# Patient Record
Sex: MALE | Race: WHITE | NOT HISPANIC OR LATINO | Employment: OTHER | URBAN - METROPOLITAN AREA
[De-identification: names, ages, dates, MRNs, and addresses within clinical notes are randomized per-mention and may not be internally consistent; named-entity substitution may affect disease eponyms.]

---

## 2021-07-17 PROBLEM — Z85.46 HISTORY OF PROSTATE CANCER: Status: ACTIVE | Noted: 2021-07-17

## 2021-07-17 PROBLEM — G20.A1 PARKINSON'S DISEASE (HCC): Status: ACTIVE | Noted: 2021-07-17

## 2021-07-17 PROBLEM — R44.1 HALLUCINATION, VISUAL: Status: ACTIVE | Noted: 2021-07-17

## 2021-07-17 PROBLEM — I10 HYPERTENSION: Status: ACTIVE | Noted: 2021-07-17

## 2021-07-17 PROBLEM — G47.00 INSOMNIA: Status: ACTIVE | Noted: 2021-07-17

## 2021-07-17 PROBLEM — Z91.81 HISTORY OF FALLING: Status: ACTIVE | Noted: 2021-07-17
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
## 2021-07-17 PROBLEM — Z79.4 LONG TERM (CURRENT) USE OF INSULIN (HCC): Status: ACTIVE | Noted: 2021-07-17

## 2021-07-17 PROBLEM — F41.9 ANXIETY DISORDER: Status: ACTIVE | Noted: 2021-07-17

## 2021-07-17 PROBLEM — Z96.651 HISTORY OF TOTAL RIGHT KNEE REPLACEMENT: Status: ACTIVE | Noted: 2021-07-17

## 2021-07-17 PROBLEM — H91.90 HARD OF HEARING: Status: ACTIVE | Noted: 2021-07-17

## 2021-07-17 PROBLEM — J45.909 ASTHMA: Status: ACTIVE | Noted: 2021-07-17

## 2021-07-17 PROBLEM — R32 URINARY INCONTINENCE: Status: ACTIVE | Noted: 2021-07-17

## 2021-07-17 PROBLEM — R41.89 COGNITIVE IMPAIRMENT: Status: ACTIVE | Noted: 2021-07-17

## 2021-07-17 PROBLEM — L89.319 PRESSURE INJURY OF SKIN OF RIGHT BUTTOCK: Status: ACTIVE | Noted: 2021-07-17

## 2021-07-17 PROBLEM — E11.9 TYPE 2 DIABETES MELLITUS WITHOUT COMPLICATIONS (HCC): Status: ACTIVE | Noted: 2021-07-17

## 2021-07-17 PROBLEM — Z97.4 WEARS HEARING AID IN BOTH EARS: Status: ACTIVE | Noted: 2021-07-17

## 2021-07-17 PROBLEM — M21.372 LEFT FOOT DROP: Status: ACTIVE | Noted: 2021-07-17

## 2021-07-17 PROBLEM — H26.9 CATARACTS, BILATERAL: Status: ACTIVE | Noted: 2021-07-17

## 2021-11-02 PROBLEM — R13.10 DYSPHAGIA: Status: ACTIVE | Noted: 2021-11-02

## 2021-11-02 PROBLEM — K21.9 GERD (GASTROESOPHAGEAL REFLUX DISEASE): Status: ACTIVE | Noted: 2021-11-02

## 2022-02-10 PROBLEM — D22.9 NEVUS: Status: ACTIVE | Noted: 2022-02-10

## 2022-02-10 PROBLEM — S31.829A BUTTOCK WOUND, LEFT, INITIAL ENCOUNTER: Status: ACTIVE | Noted: 2022-02-10

## 2022-02-15 ENCOUNTER — HOSPITAL ENCOUNTER (OUTPATIENT)
Facility: MEDICAL CENTER | Age: 77
End: 2022-02-15
Attending: PHYSICIAN ASSISTANT
Payer: COMMERCIAL

## 2022-02-15 LAB
25(OH)D3 SERPL-MCNC: 48 NG/ML (ref 30–100)
ALBUMIN SERPL BCP-MCNC: 4.4 G/DL (ref 3.2–4.9)
ALBUMIN/GLOB SERPL: 1.8 G/DL
ALP SERPL-CCNC: 97 U/L (ref 30–99)
ALT SERPL-CCNC: 10 U/L (ref 2–50)
ANION GAP SERPL CALC-SCNC: 18 MMOL/L (ref 7–16)
AST SERPL-CCNC: 15 U/L (ref 12–45)
BILIRUB SERPL-MCNC: 0.5 MG/DL (ref 0.1–1.5)
BUN SERPL-MCNC: 15 MG/DL (ref 8–22)
CALCIUM SERPL-MCNC: 9.4 MG/DL (ref 8.5–10.5)
CHLORIDE SERPL-SCNC: 105 MMOL/L (ref 96–112)
CHOLEST SERPL-MCNC: 126 MG/DL (ref 100–199)
CO2 SERPL-SCNC: 18 MMOL/L (ref 20–33)
CREAT SERPL-MCNC: 0.79 MG/DL (ref 0.5–1.4)
FASTING STATUS PATIENT QL REPORTED: NORMAL
FOLATE SERPL-MCNC: 11.4 NG/ML
GLOBULIN SER CALC-MCNC: 2.5 G/DL (ref 1.9–3.5)
GLUCOSE SERPL-MCNC: 128 MG/DL (ref 65–99)
HDLC SERPL-MCNC: 34 MG/DL
LDLC SERPL CALC-MCNC: 35 MG/DL
NT-PROBNP SERPL IA-MCNC: 109 PG/ML (ref 0–125)
POTASSIUM SERPL-SCNC: 4.8 MMOL/L (ref 3.6–5.5)
PROT SERPL-MCNC: 6.9 G/DL (ref 6–8.2)
SODIUM SERPL-SCNC: 141 MMOL/L (ref 135–145)
TRIGL SERPL-MCNC: 287 MG/DL (ref 0–149)
TSH SERPL DL<=0.005 MIU/L-ACNC: 1.2 UIU/ML (ref 0.38–5.33)
VIT B12 SERPL-MCNC: 481 PG/ML (ref 211–911)

## 2022-02-15 PROCEDURE — 80061 LIPID PANEL: CPT

## 2022-02-15 PROCEDURE — 82746 ASSAY OF FOLIC ACID SERUM: CPT

## 2022-02-15 PROCEDURE — 80053 COMPREHEN METABOLIC PANEL: CPT

## 2022-02-15 PROCEDURE — 82607 VITAMIN B-12: CPT

## 2022-02-15 PROCEDURE — 83880 ASSAY OF NATRIURETIC PEPTIDE: CPT

## 2022-02-15 PROCEDURE — 84443 ASSAY THYROID STIM HORMONE: CPT

## 2022-02-15 PROCEDURE — 82306 VITAMIN D 25 HYDROXY: CPT

## 2022-02-24 ENCOUNTER — HOSPITAL ENCOUNTER (OUTPATIENT)
Facility: MEDICAL CENTER | Age: 77
End: 2022-02-24
Attending: PHYSICIAN ASSISTANT
Payer: COMMERCIAL

## 2022-02-24 DIAGNOSIS — E11.9 TYPE 2 DIABETES MELLITUS WITHOUT COMPLICATION, WITH LONG-TERM CURRENT USE OF INSULIN (HCC): ICD-10-CM

## 2022-02-24 DIAGNOSIS — E55.9 VITAMIN D DEFICIENCY: ICD-10-CM

## 2022-02-24 DIAGNOSIS — Z85.46 PERSONAL HISTORY OF MALIGNANT NEOPLASM OF PROSTATE: ICD-10-CM

## 2022-02-24 DIAGNOSIS — R32 URINARY INCONTINENCE, UNSPECIFIED TYPE: ICD-10-CM

## 2022-02-24 DIAGNOSIS — Z79.4 TYPE 2 DIABETES MELLITUS WITHOUT COMPLICATION, WITH LONG-TERM CURRENT USE OF INSULIN (HCC): ICD-10-CM

## 2022-02-24 DIAGNOSIS — Z85.46 HISTORY OF PROSTATE CANCER: ICD-10-CM

## 2022-02-24 LAB
25(OH)D3 SERPL-MCNC: 49 NG/ML (ref 30–100)
ALBUMIN SERPL BCP-MCNC: 4.2 G/DL (ref 3.2–4.9)
ALBUMIN/GLOB SERPL: 1.5 G/DL
ALP SERPL-CCNC: 82 U/L (ref 30–99)
ALT SERPL-CCNC: 5 U/L (ref 2–50)
ANION GAP SERPL CALC-SCNC: 12 MMOL/L (ref 7–16)
AST SERPL-CCNC: 15 U/L (ref 12–45)
BASOPHILS # BLD AUTO: 0.8 % (ref 0–1.8)
BASOPHILS # BLD: 0.05 K/UL (ref 0–0.12)
BILIRUB SERPL-MCNC: 0.4 MG/DL (ref 0.1–1.5)
BUN SERPL-MCNC: 11 MG/DL (ref 8–22)
CALCIUM SERPL-MCNC: 9.3 MG/DL (ref 8.5–10.5)
CHLORIDE SERPL-SCNC: 104 MMOL/L (ref 96–112)
CHOLEST SERPL-MCNC: 111 MG/DL (ref 100–199)
CO2 SERPL-SCNC: 24 MMOL/L (ref 20–33)
CREAT SERPL-MCNC: 0.68 MG/DL (ref 0.5–1.4)
EOSINOPHIL # BLD AUTO: 0.12 K/UL (ref 0–0.51)
EOSINOPHIL NFR BLD: 1.9 % (ref 0–6.9)
ERYTHROCYTE [DISTWIDTH] IN BLOOD BY AUTOMATED COUNT: 43.8 FL (ref 35.9–50)
EST. AVERAGE GLUCOSE BLD GHB EST-MCNC: 131 MG/DL
FOLATE SERPL-MCNC: 10.1 NG/ML
GLOBULIN SER CALC-MCNC: 2.8 G/DL (ref 1.9–3.5)
GLUCOSE SERPL-MCNC: 140 MG/DL (ref 65–99)
HBA1C MFR BLD: 6.2 % (ref 4–5.6)
HCT VFR BLD AUTO: 43.7 % (ref 42–52)
HDLC SERPL-MCNC: 33 MG/DL
HGB BLD-MCNC: 15.3 G/DL (ref 14–18)
IMM GRANULOCYTES # BLD AUTO: 0.02 K/UL (ref 0–0.11)
IMM GRANULOCYTES NFR BLD AUTO: 0.3 % (ref 0–0.9)
LDLC SERPL CALC-MCNC: 43 MG/DL
LYMPHOCYTES # BLD AUTO: 1.63 K/UL (ref 1–4.8)
LYMPHOCYTES NFR BLD: 26.5 % (ref 22–41)
MCH RBC QN AUTO: 31.9 PG (ref 27–33)
MCHC RBC AUTO-ENTMCNC: 35 G/DL (ref 33.7–35.3)
MCV RBC AUTO: 91.2 FL (ref 81.4–97.8)
MONOCYTES # BLD AUTO: 0.45 K/UL (ref 0–0.85)
MONOCYTES NFR BLD AUTO: 7.3 % (ref 0–13.4)
NEUTROPHILS # BLD AUTO: 3.89 K/UL (ref 1.82–7.42)
NEUTROPHILS NFR BLD: 63.2 % (ref 44–72)
NRBC # BLD AUTO: 0 K/UL
NRBC BLD-RTO: 0 /100 WBC
NT-PROBNP SERPL IA-MCNC: 178 PG/ML (ref 0–125)
PLATELET # BLD AUTO: 182 K/UL (ref 164–446)
PMV BLD AUTO: 11.4 FL (ref 9–12.9)
POTASSIUM SERPL-SCNC: 4.3 MMOL/L (ref 3.6–5.5)
PROT SERPL-MCNC: 7 G/DL (ref 6–8.2)
PSA SERPL-MCNC: 0.45 NG/ML (ref 0–4)
RBC # BLD AUTO: 4.79 M/UL (ref 4.7–6.1)
SODIUM SERPL-SCNC: 140 MMOL/L (ref 135–145)
TRIGL SERPL-MCNC: 174 MG/DL (ref 0–149)
TSH SERPL DL<=0.005 MIU/L-ACNC: 1.54 UIU/ML (ref 0.38–5.33)
VIT B12 SERPL-MCNC: 472 PG/ML (ref 211–911)
WBC # BLD AUTO: 6.2 K/UL (ref 4.8–10.8)

## 2022-02-24 PROCEDURE — 80053 COMPREHEN METABOLIC PANEL: CPT

## 2022-02-24 PROCEDURE — 83880 ASSAY OF NATRIURETIC PEPTIDE: CPT

## 2022-02-24 PROCEDURE — 82607 VITAMIN B-12: CPT

## 2022-02-24 PROCEDURE — 84153 ASSAY OF PSA TOTAL: CPT

## 2022-02-24 PROCEDURE — 80061 LIPID PANEL: CPT

## 2022-02-24 PROCEDURE — 83036 HEMOGLOBIN GLYCOSYLATED A1C: CPT

## 2022-02-24 PROCEDURE — 84443 ASSAY THYROID STIM HORMONE: CPT

## 2022-02-24 PROCEDURE — 82306 VITAMIN D 25 HYDROXY: CPT

## 2022-02-24 PROCEDURE — 85025 COMPLETE CBC W/AUTO DIFF WBC: CPT

## 2022-02-24 PROCEDURE — 82746 ASSAY OF FOLIC ACID SERUM: CPT

## 2022-04-25 ENCOUNTER — APPOINTMENT (RX ONLY)
Dept: URBAN - METROPOLITAN AREA CLINIC 6 | Facility: CLINIC | Age: 77
Setting detail: DERMATOLOGY
End: 2022-04-25

## 2022-04-25 DIAGNOSIS — L81.4 OTHER MELANIN HYPERPIGMENTATION: ICD-10-CM

## 2022-04-25 DIAGNOSIS — D22 MELANOCYTIC NEVI: ICD-10-CM

## 2022-04-25 DIAGNOSIS — L82.1 OTHER SEBORRHEIC KERATOSIS: ICD-10-CM

## 2022-04-25 DIAGNOSIS — L82.0 INFLAMED SEBORRHEIC KERATOSIS: ICD-10-CM

## 2022-04-25 DIAGNOSIS — D18.0 HEMANGIOMA: ICD-10-CM

## 2022-04-25 DIAGNOSIS — L91.8 OTHER HYPERTROPHIC DISORDERS OF THE SKIN: ICD-10-CM

## 2022-04-25 DIAGNOSIS — L21.8 OTHER SEBORRHEIC DERMATITIS: ICD-10-CM | Status: WORSENING

## 2022-04-25 DIAGNOSIS — Z71.89 OTHER SPECIFIED COUNSELING: ICD-10-CM

## 2022-04-25 PROBLEM — D22.61 MELANOCYTIC NEVI OF RIGHT UPPER LIMB, INCLUDING SHOULDER: Status: ACTIVE | Noted: 2022-04-25

## 2022-04-25 PROBLEM — D18.01 HEMANGIOMA OF SKIN AND SUBCUTANEOUS TISSUE: Status: ACTIVE | Noted: 2022-04-25

## 2022-04-25 PROBLEM — D22.62 MELANOCYTIC NEVI OF LEFT UPPER LIMB, INCLUDING SHOULDER: Status: ACTIVE | Noted: 2022-04-25

## 2022-04-25 PROBLEM — D22.5 MELANOCYTIC NEVI OF TRUNK: Status: ACTIVE | Noted: 2022-04-25

## 2022-04-25 PROCEDURE — ? PRESCRIPTION

## 2022-04-25 PROCEDURE — ? SHAVE REMOVAL AND DESTRUCTION

## 2022-04-25 PROCEDURE — ? SUNSCREEN RECOMMENDATIONS

## 2022-04-25 PROCEDURE — ? LIQUID NITROGEN

## 2022-04-25 PROCEDURE — 17110 DESTRUCTION B9 LES UP TO 14: CPT | Mod: 59

## 2022-04-25 PROCEDURE — ? DIAGNOSIS COMMENT

## 2022-04-25 PROCEDURE — ? PRESCRIPTION MEDICATION MANAGEMENT

## 2022-04-25 PROCEDURE — 99204 OFFICE O/P NEW MOD 45 MIN: CPT | Mod: 25

## 2022-04-25 PROCEDURE — 11310 SHAVE SKIN LESION 0.5 CM/<: CPT

## 2022-04-25 PROCEDURE — ? COUNSELING

## 2022-04-25 RX ORDER — KETOCONAZOLE 20 MG/ML
SHAMPOO, SUSPENSION TOPICAL
Qty: 120 | Refills: 11 | Status: ERX | COMMUNITY
Start: 2022-04-25

## 2022-04-25 RX ADMIN — KETOCONAZOLE: 20 SHAMPOO, SUSPENSION TOPICAL at 00:00

## 2022-04-25 ASSESSMENT — LOCATION ZONE DERM
LOCATION ZONE: EAR
LOCATION ZONE: NECK
LOCATION ZONE: EYELID
LOCATION ZONE: TRUNK
LOCATION ZONE: FACE
LOCATION ZONE: LEG
LOCATION ZONE: ARM

## 2022-04-25 ASSESSMENT — LOCATION SIMPLE DESCRIPTION DERM
LOCATION SIMPLE: CHEST
LOCATION SIMPLE: RIGHT UPPER ARM
LOCATION SIMPLE: LEFT UPPER ARM
LOCATION SIMPLE: RIGHT UPPER BACK
LOCATION SIMPLE: RIGHT FOREHEAD
LOCATION SIMPLE: RIGHT EYELID
LOCATION SIMPLE: RIGHT EAR
LOCATION SIMPLE: RIGHT THIGH
LOCATION SIMPLE: CHIN
LOCATION SIMPLE: LEFT EAR
LOCATION SIMPLE: RIGHT FOREARM
LOCATION SIMPLE: ABDOMEN
LOCATION SIMPLE: TRAPEZIAL NECK
LOCATION SIMPLE: INFERIOR FOREHEAD
LOCATION SIMPLE: UPPER BACK
LOCATION SIMPLE: LEFT FOREARM

## 2022-04-25 ASSESSMENT — LOCATION DETAILED DESCRIPTION DERM
LOCATION DETAILED: RIGHT INFERIOR MEDIAL UPPER BACK
LOCATION DETAILED: PERIUMBILICAL SKIN
LOCATION DETAILED: SUPERIOR THORACIC SPINE
LOCATION DETAILED: LEFT VENTRAL PROXIMAL FOREARM
LOCATION DETAILED: RIGHT LATERAL CANTHUS
LOCATION DETAILED: RIGHT ANTERIOR PROXIMAL THIGH
LOCATION DETAILED: MID TRAPEZIAL NECK
LOCATION DETAILED: LEFT DISTAL POSTERIOR UPPER ARM
LOCATION DETAILED: RIGHT VENTRAL PROXIMAL FOREARM
LOCATION DETAILED: LEFT ANTIHELIX
LOCATION DETAILED: INFERIOR MID FOREHEAD
LOCATION DETAILED: STERNUM
LOCATION DETAILED: EPIGASTRIC SKIN
LOCATION DETAILED: LEFT PROXIMAL DORSAL FOREARM
LOCATION DETAILED: RIGHT SUPERIOR CRUS OF ANTIHELIX
LOCATION DETAILED: RIGHT INFERIOR MEDIAL FOREHEAD
LOCATION DETAILED: RIGHT DISTAL POSTERIOR UPPER ARM
LOCATION DETAILED: RIGHT PROXIMAL DORSAL FOREARM
LOCATION DETAILED: RIGHT LATERAL FOREHEAD
LOCATION DETAILED: LEFT ANTERIOR DISTAL UPPER ARM
LOCATION DETAILED: LEFT CHIN
LOCATION DETAILED: RIGHT ANTERIOR DISTAL UPPER ARM

## 2022-04-25 ASSESSMENT — SEVERITY ASSESSMENT: HOW SEVERE IS THIS PATIENT'S CONDITION?: MODERATE

## 2022-04-25 NOTE — PROCEDURE: SUNSCREEN RECOMMENDATIONS

## 2022-04-25 NOTE — PROCEDURE: COUNSELING
Detail Level: Zone
Detail Level: Generalized
Sunscreen Recommendations: Recommended broad spectrum inorganic or physical sunscreens primarily containing zinc oxide or titanium dioxide.
Detail Level: Detailed

## 2022-04-25 NOTE — PROCEDURE: MIPS QUALITY
Quality 130: Documentation Of Current Medications In The Medical Record: Current Medications Documented
Quality 111:Pneumonia Vaccination Status For Older Adults: Pneumococcal Vaccination Previously Received
Detail Level: Detailed
Quality 431: Preventive Care And Screening: Unhealthy Alcohol Use - Screening: Patient not identified as an unhealthy alcohol user when screened for unhealthy alcohol use using a systematic screening method
Quality 226: Preventive Care And Screening: Tobacco Use: Screening And Cessation Intervention: Patient screened for tobacco use and is an ex/non-smoker

## 2022-04-25 NOTE — PROCEDURE: PRESCRIPTION MEDICATION MANAGEMENT
Plan: Will consider adding topical steroid or ciclopirox cream at follow-up visit if lack of improvement.
Render In Strict Bullet Format?: No
Detail Level: Zone
Initiate Treatment: Ketoconazole 2% shampoo 2-3 times weekly, once weekly as maintenance.

## 2022-04-25 NOTE — PROCEDURE: DIAGNOSIS COMMENT
Detail Level: Zone
Comment: Patient has history of Parkinson’s disease, prominent seborrheic dermatitis involving the scalp, ears, face and groin.
Render Risk Assessment In Note?: no

## 2022-04-25 NOTE — PROCEDURE: SHAVE REMOVAL AND DESTRUCTION
Detail Level: Detailed
Size Of Lesion In Cm: 0.5
Size After Destruction (Required For Destruction Billing): 0.9
Anesthesia Type: 1% lidocaine with epinephrine
Anesthesia Volume In Cc: 1.5
Anesthesia Volume In Cc: 0
Hemostasis: Drysol
Cautery Type: electrodesiccation
Was Curettage Performed?: Yes
Number Of Curettages: 2
Wound Care: Petrolatum
Dressing: no dressing
Accession #: [id number]
Bill As?: Note: Bill Malignant Destruction If Path Confirms Malignant Lesion. Only Bill As Shave Removal If Path Comes Back Benign. Do Not Bill Shave Removal On Malignant Lesions.: Shave Removal
Lab: [id number]
Lab Facility: 
Path Notes Override (Will Replace All Of The Above Text): Please check margins.
Render Path Notes In Note?: No
Consent: Written consent was obtained and risks were reviewed including but not limited to scarring, infection, bleeding, scabbing, incomplete removal, nerve damage and allergy to anesthesia.
Post-Care Instructions: I reviewed with the patient in detail post-care instructions. Patient is to keep the biopsy site dry overnight, and then apply bacitracin twice daily until healed. Patient may apply hydrogen peroxide soaks to remove any crusting.
Notification Instructions: Patient will be notified of biopsy results. However, patient instructed to call the office if not contacted within 2 weeks.
Billing Type: Third-Party Bill

## 2022-04-25 NOTE — PROCEDURE: LIQUID NITROGEN
Medical Necessity Clause: This procedure was medically necessary because the lesions that were treated were:
Render Note In Bullet Format When Appropriate: No
Show Aperture Variable?: Yes
Detail Level: Detailed
Number Of Freeze-Thaw Cycles: 2 freeze-thaw cycles
Medical Necessity Information: It is in your best interest to select a reason for this procedure from the list below. All of these items fulfill various CMS LCD requirements except the new and changing color options.
Spray Paint Text: The liquid nitrogen was applied to the skin utilizing a spray paint frosting technique.
Consent: The patient's consent was obtained including but not limited to risks of crusting, scabbing, blistering, scarring, darker or lighter pigmentary change, recurrence, incomplete removal and infection.
Post-Care Instructions: I reviewed with the patient in detail post-care instructions. Patient is to wear sunprotection, and avoid picking at any of the treated lesions. Pt may apply Vaseline to crusted or scabbing areas.
Duration Of Freeze Thaw-Cycle (Seconds): 8

## 2022-04-27 ENCOUNTER — APPOINTMENT (RX ONLY)
Dept: URBAN - METROPOLITAN AREA CLINIC 6 | Facility: CLINIC | Age: 77
Setting detail: DERMATOLOGY
End: 2022-04-27

## 2022-04-27 DIAGNOSIS — L21.8 OTHER SEBORRHEIC DERMATITIS: ICD-10-CM

## 2022-04-27 PROCEDURE — ? PRESCRIPTION

## 2022-04-27 RX ORDER — KETOCONAZOLE 20 MG/ML
SHAMPOO, SUSPENSION TOPICAL
Qty: 120 | Refills: 11

## 2022-07-20 PROBLEM — E66.01 MORBID OBESITY WITH BODY MASS INDEX (BMI) OF 40.0 TO 44.9 IN ADULT (HCC): Status: ACTIVE | Noted: 2022-07-20

## 2022-07-25 ENCOUNTER — APPOINTMENT (RX ONLY)
Dept: URBAN - METROPOLITAN AREA CLINIC 6 | Facility: CLINIC | Age: 77
Setting detail: DERMATOLOGY
End: 2022-07-25

## 2022-07-25 DIAGNOSIS — L82.0 INFLAMED SEBORRHEIC KERATOSIS: ICD-10-CM

## 2022-07-25 DIAGNOSIS — L21.8 OTHER SEBORRHEIC DERMATITIS: ICD-10-CM | Status: IMPROVED

## 2022-07-25 PROCEDURE — 17110 DESTRUCTION B9 LES UP TO 14: CPT

## 2022-07-25 PROCEDURE — ? PRESCRIPTION MEDICATION MANAGEMENT

## 2022-07-25 PROCEDURE — ? PRESCRIPTION

## 2022-07-25 PROCEDURE — 99213 OFFICE O/P EST LOW 20 MIN: CPT | Mod: 25

## 2022-07-25 PROCEDURE — ? COUNSELING

## 2022-07-25 PROCEDURE — ? LIQUID NITROGEN

## 2022-07-25 PROCEDURE — ? DIAGNOSIS COMMENT

## 2022-07-25 RX ORDER — TRIAMCINOLONE ACETONIDE 1 MG/G
CREAM TOPICAL
Qty: 454 | Refills: 2 | Status: ERX | COMMUNITY
Start: 2022-07-25

## 2022-07-25 RX ADMIN — TRIAMCINOLONE ACETONIDE: 1 CREAM TOPICAL at 00:00

## 2022-07-25 ASSESSMENT — LOCATION DETAILED DESCRIPTION DERM
LOCATION DETAILED: INFERIOR MID FOREHEAD
LOCATION DETAILED: LEFT ANTIHELIX
LOCATION DETAILED: RIGHT SUPERIOR CRUS OF ANTIHELIX
LOCATION DETAILED: LEFT LATERAL TEMPLE
LOCATION DETAILED: RIGHT CENTRAL TEMPLE

## 2022-07-25 ASSESSMENT — LOCATION SIMPLE DESCRIPTION DERM
LOCATION SIMPLE: RIGHT EAR
LOCATION SIMPLE: LEFT EAR
LOCATION SIMPLE: LEFT TEMPLE
LOCATION SIMPLE: RIGHT TEMPLE
LOCATION SIMPLE: INFERIOR FOREHEAD

## 2022-07-25 ASSESSMENT — LOCATION ZONE DERM
LOCATION ZONE: FACE
LOCATION ZONE: EAR

## 2022-07-25 NOTE — PROCEDURE: PRESCRIPTION MEDICATION MANAGEMENT
Continue Regimen: Ketoconazole 2% shampoo 2-3 times weekly, once weekly as maintenance.
Render In Strict Bullet Format?: No
Detail Level: Zone
Initiate Treatment: Triamcinolone 0.1% cream BID PRN.

## 2022-07-25 NOTE — PROCEDURE: LIQUID NITROGEN
Include Z78.9 (Other Specified Conditions Influencing Health Status) As An Associated Diagnosis?: No
Detail Level: Detailed
Show Spray Paint Technique Variable?: Yes
Post-Care Instructions: I reviewed with the patient in detail post-care instructions. Patient is to wear sunprotection, and avoid picking at any of the treated lesions. Pt may apply Vaseline to crusted or scabbing areas.
Duration Of Freeze Thaw-Cycle (Seconds): 8
Spray Paint Text: The liquid nitrogen was applied to the skin utilizing a spray paint frosting technique.
Consent: The patient's consent was obtained including but not limited to risks of crusting, scabbing, blistering, scarring, darker or lighter pigmentary change, recurrence, incomplete removal and infection.
Number Of Freeze-Thaw Cycles: 2 freeze-thaw cycles
Medical Necessity Information: It is in your best interest to select a reason for this procedure from the list below. All of these items fulfill various CMS LCD requirements except the new and changing color options.
Medical Necessity Clause: This procedure was medically necessary because the lesions that were treated were:

## 2022-08-02 ENCOUNTER — RX ONLY (OUTPATIENT)
Age: 77
Setting detail: RX ONLY
End: 2022-08-02

## 2022-08-02 RX ORDER — TRIAMCINOLONE ACETONIDE 1 MG/G
CREAM TOPICAL
Qty: 454 | Refills: 2 | Status: ERX

## 2022-11-15 ENCOUNTER — APPOINTMENT (OUTPATIENT)
Dept: RADIOLOGY | Facility: MEDICAL CENTER | Age: 77
DRG: 192 | End: 2022-11-15
Attending: EMERGENCY MEDICINE
Payer: COMMERCIAL

## 2022-11-15 ENCOUNTER — HOSPITAL ENCOUNTER (INPATIENT)
Facility: MEDICAL CENTER | Age: 77
LOS: 3 days | DRG: 192 | End: 2022-11-18
Attending: EMERGENCY MEDICINE | Admitting: STUDENT IN AN ORGANIZED HEALTH CARE EDUCATION/TRAINING PROGRAM
Payer: COMMERCIAL

## 2022-11-15 DIAGNOSIS — J44.1 COPD EXACERBATION (HCC): ICD-10-CM

## 2022-11-15 DIAGNOSIS — G20.A1 PARKINSON'S DISEASE (HCC): ICD-10-CM

## 2022-11-15 DIAGNOSIS — J20.9 COPD WITH ACUTE BRONCHITIS (HCC): ICD-10-CM

## 2022-11-15 DIAGNOSIS — R53.81 DEBILITY: ICD-10-CM

## 2022-11-15 DIAGNOSIS — J44.0 COPD WITH ACUTE BRONCHITIS (HCC): ICD-10-CM

## 2022-11-15 DIAGNOSIS — R32 URINARY INCONTINENCE, UNSPECIFIED TYPE: ICD-10-CM

## 2022-11-15 DIAGNOSIS — M21.372 LEFT FOOT DROP: ICD-10-CM

## 2022-11-15 PROBLEM — D22.9 NEVUS: Status: RESOLVED | Noted: 2022-02-10 | Resolved: 2022-11-15

## 2022-11-15 PROBLEM — B34.8 PARAINFLUENZA INFECTION: Status: ACTIVE | Noted: 2022-11-15
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
## 2022-11-15 PROBLEM — S31.829A BUTTOCK WOUND, LEFT, INITIAL ENCOUNTER: Status: RESOLVED | Noted: 2022-02-10 | Resolved: 2022-11-15

## 2022-11-15 PROBLEM — L89.319 PRESSURE INJURY OF SKIN OF RIGHT BUTTOCK: Status: RESOLVED | Noted: 2021-07-17 | Resolved: 2022-11-15

## 2022-11-15 PROBLEM — Z97.4 WEARS HEARING AID IN BOTH EARS: Status: RESOLVED | Noted: 2021-07-17 | Resolved: 2022-11-15

## 2022-11-15 PROBLEM — J96.01 ACUTE RESPIRATORY FAILURE WITH HYPOXIA (HCC): Status: ACTIVE | Noted: 2022-11-15

## 2022-11-15 PROBLEM — J45.909 ASTHMA: Status: RESOLVED | Noted: 2021-07-17 | Resolved: 2022-11-15

## 2022-11-15 PROBLEM — Z91.81 HISTORY OF FALLING: Status: RESOLVED | Noted: 2021-07-17 | Resolved: 2022-11-15

## 2022-11-15 PROBLEM — G47.00 INSOMNIA: Status: RESOLVED | Noted: 2021-07-17 | Resolved: 2022-11-15

## 2022-11-15 LAB
ALBUMIN SERPL BCP-MCNC: 3.4 G/DL (ref 3.2–4.9)
ALBUMIN/GLOB SERPL: 1.2 G/DL
ALP SERPL-CCNC: 92 U/L (ref 30–99)
ALT SERPL-CCNC: 11 U/L (ref 2–50)
ANION GAP SERPL CALC-SCNC: 9 MMOL/L (ref 7–16)
AST SERPL-CCNC: 11 U/L (ref 12–45)
B PARAP IS1001 DNA NPH QL NAA+NON-PROBE: NOT DETECTED
B PERT.PT PRMT NPH QL NAA+NON-PROBE: NOT DETECTED
BASOPHILS # BLD AUTO: 0.8 % (ref 0–1.8)
BASOPHILS # BLD: 0.06 K/UL (ref 0–0.12)
BILIRUB SERPL-MCNC: 0.4 MG/DL (ref 0.1–1.5)
BUN SERPL-MCNC: 11 MG/DL (ref 8–22)
C PNEUM DNA NPH QL NAA+NON-PROBE: NOT DETECTED
CALCIUM SERPL-MCNC: 8.2 MG/DL (ref 8.5–10.5)
CHLORIDE SERPL-SCNC: 102 MMOL/L (ref 96–112)
CO2 SERPL-SCNC: 24 MMOL/L (ref 20–33)
CREAT SERPL-MCNC: 0.61 MG/DL (ref 0.5–1.4)
EKG IMPRESSION: NORMAL
EKG IMPRESSION: NORMAL
EOSINOPHIL # BLD AUTO: 0.26 K/UL (ref 0–0.51)
EOSINOPHIL NFR BLD: 3.4 % (ref 0–6.9)
ERYTHROCYTE [DISTWIDTH] IN BLOOD BY AUTOMATED COUNT: 43.8 FL (ref 35.9–50)
FEV1 % PREDICTED: 67
FEV1/FVC % PREDICTED: 93
FEV1/FVC: 70.69 %
FEV1: 2.06 L
FLUAV RNA NPH QL NAA+NON-PROBE: NOT DETECTED
FLUAV RNA SPEC QL NAA+PROBE: NEGATIVE
FLUBV RNA NPH QL NAA+NON-PROBE: NOT DETECTED
FLUBV RNA SPEC QL NAA+PROBE: NEGATIVE
FVC % PREDICTED: NORMAL
FVC (L): 2.91
GFR SERPLBLD CREATININE-BSD FMLA CKD-EPI: 100 ML/MIN/1.73 M 2
GLOBULIN SER CALC-MCNC: 2.9 G/DL (ref 1.9–3.5)
GLUCOSE BLD STRIP.AUTO-MCNC: 193 MG/DL (ref 65–99)
GLUCOSE BLD STRIP.AUTO-MCNC: 245 MG/DL (ref 65–99)
GLUCOSE BLD STRIP.AUTO-MCNC: 256 MG/DL (ref 65–99)
GLUCOSE BLD STRIP.AUTO-MCNC: 258 MG/DL (ref 65–99)
GLUCOSE BLD STRIP.AUTO-MCNC: 271 MG/DL (ref 65–99)
GLUCOSE SERPL-MCNC: 159 MG/DL (ref 65–99)
HADV DNA NPH QL NAA+NON-PROBE: NOT DETECTED
HCOV 229E RNA NPH QL NAA+NON-PROBE: NOT DETECTED
HCOV HKU1 RNA NPH QL NAA+NON-PROBE: NOT DETECTED
HCOV NL63 RNA NPH QL NAA+NON-PROBE: NOT DETECTED
HCOV OC43 RNA NPH QL NAA+NON-PROBE: NOT DETECTED
HCT VFR BLD AUTO: 40.9 % (ref 42–52)
HGB BLD-MCNC: 14.1 G/DL (ref 14–18)
HMPV RNA NPH QL NAA+NON-PROBE: NOT DETECTED
HPIV1 RNA NPH QL NAA+NON-PROBE: NOT DETECTED
HPIV2 RNA NPH QL NAA+NON-PROBE: NOT DETECTED
HPIV3 RNA NPH QL NAA+NON-PROBE: DETECTED
HPIV4 RNA NPH QL NAA+NON-PROBE: NOT DETECTED
IMM GRANULOCYTES # BLD AUTO: 0.04 K/UL (ref 0–0.11)
IMM GRANULOCYTES NFR BLD AUTO: 0.5 % (ref 0–0.9)
LYMPHOCYTES # BLD AUTO: 1.94 K/UL (ref 1–4.8)
LYMPHOCYTES NFR BLD: 25.7 % (ref 22–41)
M PNEUMO DNA NPH QL NAA+NON-PROBE: NOT DETECTED
MAGNESIUM SERPL-MCNC: 1.6 MG/DL (ref 1.5–2.5)
MCH RBC QN AUTO: 31.5 PG (ref 27–33)
MCHC RBC AUTO-ENTMCNC: 34.5 G/DL (ref 33.7–35.3)
MCV RBC AUTO: 91.5 FL (ref 81.4–97.8)
MONOCYTES # BLD AUTO: 0.78 K/UL (ref 0–0.85)
MONOCYTES NFR BLD AUTO: 10.3 % (ref 0–13.4)
NEUTROPHILS # BLD AUTO: 4.48 K/UL (ref 1.82–7.42)
NEUTROPHILS NFR BLD: 59.3 % (ref 44–72)
NRBC # BLD AUTO: 0 K/UL
NRBC BLD-RTO: 0 /100 WBC
NT-PROBNP SERPL IA-MCNC: 249 PG/ML (ref 0–125)
PLATELET # BLD AUTO: 145 K/UL (ref 164–446)
PMV BLD AUTO: 9.9 FL (ref 9–12.9)
POTASSIUM SERPL-SCNC: 3.8 MMOL/L (ref 3.6–5.5)
PROCALCITONIN SERPL-MCNC: 0.14 NG/ML
PROT SERPL-MCNC: 6.3 G/DL (ref 6–8.2)
RBC # BLD AUTO: 4.47 M/UL (ref 4.7–6.1)
RSV RNA NPH QL NAA+NON-PROBE: NOT DETECTED
RSV RNA SPEC QL NAA+PROBE: NEGATIVE
RV+EV RNA NPH QL NAA+NON-PROBE: NOT DETECTED
SARS-COV-2 RNA NPH QL NAA+NON-PROBE: NOTDETECTED
SARS-COV-2 RNA RESP QL NAA+PROBE: NOTDETECTED
SODIUM SERPL-SCNC: 135 MMOL/L (ref 135–145)
SPECIMEN SOURCE: NORMAL
TROPONIN T SERPL-MCNC: 9 NG/L (ref 6–19)
WBC # BLD AUTO: 7.6 K/UL (ref 4.8–10.8)

## 2022-11-15 PROCEDURE — 94640 AIRWAY INHALATION TREATMENT: CPT

## 2022-11-15 PROCEDURE — 83880 ASSAY OF NATRIURETIC PEPTIDE: CPT

## 2022-11-15 PROCEDURE — 83735 ASSAY OF MAGNESIUM: CPT

## 2022-11-15 PROCEDURE — 94669 MECHANICAL CHEST WALL OSCILL: CPT

## 2022-11-15 PROCEDURE — 82962 GLUCOSE BLOOD TEST: CPT

## 2022-11-15 PROCEDURE — 36415 COLL VENOUS BLD VENIPUNCTURE: CPT

## 2022-11-15 PROCEDURE — 700101 HCHG RX REV CODE 250: Performed by: EMERGENCY MEDICINE

## 2022-11-15 PROCEDURE — 94010 BREATHING CAPACITY TEST: CPT

## 2022-11-15 PROCEDURE — 99221 1ST HOSP IP/OBS SF/LOW 40: CPT | Mod: AI,GC | Performed by: STUDENT IN AN ORGANIZED HEALTH CARE EDUCATION/TRAINING PROGRAM

## 2022-11-15 PROCEDURE — 99285 EMERGENCY DEPT VISIT HI MDM: CPT

## 2022-11-15 PROCEDURE — 96374 THER/PROPH/DIAG INJ IV PUSH: CPT

## 2022-11-15 PROCEDURE — 84484 ASSAY OF TROPONIN QUANT: CPT

## 2022-11-15 PROCEDURE — A9270 NON-COVERED ITEM OR SERVICE: HCPCS | Performed by: STUDENT IN AN ORGANIZED HEALTH CARE EDUCATION/TRAINING PROGRAM

## 2022-11-15 PROCEDURE — 87633 RESP VIRUS 12-25 TARGETS: CPT

## 2022-11-15 PROCEDURE — 80053 COMPREHEN METABOLIC PANEL: CPT

## 2022-11-15 PROCEDURE — 700111 HCHG RX REV CODE 636 W/ 250 OVERRIDE (IP): Performed by: EMERGENCY MEDICINE

## 2022-11-15 PROCEDURE — 700102 HCHG RX REV CODE 250 W/ 637 OVERRIDE(OP): Performed by: INTERNAL MEDICINE

## 2022-11-15 PROCEDURE — 84145 PROCALCITONIN (PCT): CPT

## 2022-11-15 PROCEDURE — 0241U HCHG SARS-COV-2 COVID-19 NFCT DS RESP RNA 4 TRGT MIC: CPT

## 2022-11-15 PROCEDURE — 85025 COMPLETE CBC W/AUTO DIFF WBC: CPT

## 2022-11-15 PROCEDURE — 71045 X-RAY EXAM CHEST 1 VIEW: CPT

## 2022-11-15 PROCEDURE — C9803 HOPD COVID-19 SPEC COLLECT: HCPCS | Performed by: EMERGENCY MEDICINE

## 2022-11-15 PROCEDURE — 87486 CHLMYD PNEUM DNA AMP PROBE: CPT

## 2022-11-15 PROCEDURE — 700111 HCHG RX REV CODE 636 W/ 250 OVERRIDE (IP): Performed by: STUDENT IN AN ORGANIZED HEALTH CARE EDUCATION/TRAINING PROGRAM

## 2022-11-15 PROCEDURE — 770020 HCHG ROOM/CARE - TELE (206)

## 2022-11-15 PROCEDURE — 93005 ELECTROCARDIOGRAM TRACING: CPT | Performed by: EMERGENCY MEDICINE

## 2022-11-15 PROCEDURE — 8E0ZXY6 ISOLATION: ICD-10-PCS | Performed by: INTERNAL MEDICINE

## 2022-11-15 PROCEDURE — 700102 HCHG RX REV CODE 250 W/ 637 OVERRIDE(OP): Performed by: STUDENT IN AN ORGANIZED HEALTH CARE EDUCATION/TRAINING PROGRAM

## 2022-11-15 PROCEDURE — 94664 DEMO&/EVAL PT USE INHALER: CPT

## 2022-11-15 PROCEDURE — A9270 NON-COVERED ITEM OR SERVICE: HCPCS | Performed by: INTERNAL MEDICINE

## 2022-11-15 PROCEDURE — 87798 DETECT AGENT NOS DNA AMP: CPT

## 2022-11-15 PROCEDURE — 87581 M.PNEUMON DNA AMP PROBE: CPT

## 2022-11-15 PROCEDURE — 93010 ELECTROCARDIOGRAM REPORT: CPT | Performed by: INTERNAL MEDICINE

## 2022-11-15 PROCEDURE — 700101 HCHG RX REV CODE 250: Performed by: INTERNAL MEDICINE

## 2022-11-15 PROCEDURE — 99221 1ST HOSP IP/OBS SF/LOW 40: CPT | Mod: GC | Performed by: INTERNAL MEDICINE

## 2022-11-15 RX ORDER — BISACODYL 10 MG
10 SUPPOSITORY, RECTAL RECTAL
Status: DISCONTINUED | OUTPATIENT
Start: 2022-11-15 | End: 2022-11-18 | Stop reason: HOSPADM

## 2022-11-15 RX ORDER — IPRATROPIUM BROMIDE AND ALBUTEROL SULFATE 2.5; .5 MG/3ML; MG/3ML
3 SOLUTION RESPIRATORY (INHALATION)
Status: DISCONTINUED | OUTPATIENT
Start: 2022-11-15 | End: 2022-11-15

## 2022-11-15 RX ORDER — LOSARTAN POTASSIUM 50 MG/1
100 TABLET ORAL DAILY
Status: DISCONTINUED | OUTPATIENT
Start: 2022-11-15 | End: 2022-11-18 | Stop reason: HOSPADM

## 2022-11-15 RX ORDER — CARBIDOPA/LEVODOPA 25MG-250MG
2 TABLET ORAL EVERY 8 HOURS
Status: DISCONTINUED | OUTPATIENT
Start: 2022-11-15 | End: 2022-11-15

## 2022-11-15 RX ORDER — INSULIN LISPRO 100 [IU]/ML
2-9 INJECTION, SOLUTION INTRAVENOUS; SUBCUTANEOUS
Status: DISCONTINUED | OUTPATIENT
Start: 2022-11-15 | End: 2022-11-15

## 2022-11-15 RX ORDER — CARBIDOPA AND LEVODOPA 50; 200 MG/1; MG/1
1 TABLET, EXTENDED RELEASE ORAL
Status: DISCONTINUED | OUTPATIENT
Start: 2022-11-15 | End: 2022-11-18 | Stop reason: HOSPADM

## 2022-11-15 RX ORDER — FLUOXETINE 10 MG/1
10 CAPSULE ORAL 2 TIMES DAILY
COMMUNITY
End: 2023-05-02

## 2022-11-15 RX ORDER — METHYLPREDNISOLONE SODIUM SUCCINATE 125 MG/2ML
125 INJECTION, POWDER, LYOPHILIZED, FOR SOLUTION INTRAMUSCULAR; INTRAVENOUS ONCE
Status: COMPLETED | OUTPATIENT
Start: 2022-11-15 | End: 2022-11-15

## 2022-11-15 RX ORDER — SIMETHICONE 125 MG
250 TABLET,CHEWABLE ORAL EVERY 12 HOURS PRN
COMMUNITY
End: 2023-06-14

## 2022-11-15 RX ORDER — INSULIN LISPRO 100 [IU]/ML
0.2 INJECTION, SOLUTION INTRAVENOUS; SUBCUTANEOUS
Status: DISCONTINUED | OUTPATIENT
Start: 2022-11-15 | End: 2022-11-17

## 2022-11-15 RX ORDER — INSULIN LISPRO 100 [IU]/ML
0.2 INJECTION, SOLUTION INTRAVENOUS; SUBCUTANEOUS
Status: DISCONTINUED | OUTPATIENT
Start: 2022-11-15 | End: 2022-11-15

## 2022-11-15 RX ORDER — NYSTATIN 100000 [USP'U]/G
POWDER TOPICAL 3 TIMES DAILY
Status: DISCONTINUED | OUTPATIENT
Start: 2022-11-15 | End: 2022-11-18 | Stop reason: HOSPADM

## 2022-11-15 RX ORDER — ACETAMINOPHEN 325 MG/1
650 TABLET ORAL EVERY 6 HOURS PRN
Status: DISCONTINUED | OUTPATIENT
Start: 2022-11-15 | End: 2022-11-18 | Stop reason: HOSPADM

## 2022-11-15 RX ORDER — NYSTATIN 100000 [USP'U]/G
POWDER TOPICAL 2 TIMES DAILY
Status: COMPLETED | OUTPATIENT
Start: 2022-11-15 | End: 2022-11-15

## 2022-11-15 RX ORDER — CARBOXYMETHYLCELLULOSE SODIUM 5 MG/ML
1 SOLUTION/ DROPS OPHTHALMIC 3 TIMES DAILY
Status: DISCONTINUED | OUTPATIENT
Start: 2022-11-15 | End: 2022-11-18 | Stop reason: HOSPADM

## 2022-11-15 RX ORDER — IPRATROPIUM BROMIDE AND ALBUTEROL SULFATE 2.5; .5 MG/3ML; MG/3ML
3 SOLUTION RESPIRATORY (INHALATION)
Status: DISCONTINUED | OUTPATIENT
Start: 2022-11-15 | End: 2022-11-16

## 2022-11-15 RX ORDER — BUDESONIDE AND FORMOTEROL FUMARATE DIHYDRATE 160; 4.5 UG/1; UG/1
2 AEROSOL RESPIRATORY (INHALATION)
Status: DISCONTINUED | OUTPATIENT
Start: 2022-11-15 | End: 2022-11-18 | Stop reason: HOSPADM

## 2022-11-15 RX ORDER — OMEPRAZOLE 20 MG/1
20 CAPSULE, DELAYED RELEASE ORAL DAILY
Status: DISCONTINUED | OUTPATIENT
Start: 2022-11-15 | End: 2022-11-18 | Stop reason: HOSPADM

## 2022-11-15 RX ORDER — TAMSULOSIN HYDROCHLORIDE 0.4 MG/1
0.4 CAPSULE ORAL
Status: DISCONTINUED | OUTPATIENT
Start: 2022-11-15 | End: 2022-11-18 | Stop reason: HOSPADM

## 2022-11-15 RX ORDER — AMOXICILLIN 250 MG
2 CAPSULE ORAL 2 TIMES DAILY
Status: DISCONTINUED | OUTPATIENT
Start: 2022-11-15 | End: 2022-11-18 | Stop reason: HOSPADM

## 2022-11-15 RX ORDER — AZITHROMYCIN 250 MG/1
500 TABLET, FILM COATED ORAL DAILY
Status: COMPLETED | OUTPATIENT
Start: 2022-11-15 | End: 2022-11-17

## 2022-11-15 RX ORDER — DONEPEZIL HYDROCHLORIDE 5 MG/1
10 TABLET, FILM COATED ORAL 2 TIMES DAILY
Status: DISCONTINUED | OUTPATIENT
Start: 2022-11-15 | End: 2022-11-18 | Stop reason: HOSPADM

## 2022-11-15 RX ORDER — ALBUTEROL SULFATE 90 UG/1
2 AEROSOL, METERED RESPIRATORY (INHALATION) EVERY 4 HOURS PRN
Status: DISCONTINUED | OUTPATIENT
Start: 2022-11-15 | End: 2022-11-18 | Stop reason: HOSPADM

## 2022-11-15 RX ORDER — INSULIN LISPRO 100 [IU]/ML
2-9 INJECTION, SOLUTION INTRAVENOUS; SUBCUTANEOUS
Status: DISCONTINUED | OUTPATIENT
Start: 2022-11-15 | End: 2022-11-17

## 2022-11-15 RX ORDER — PREDNISONE 20 MG/1
40 TABLET ORAL DAILY
Status: DISCONTINUED | OUTPATIENT
Start: 2022-11-16 | End: 2022-11-15

## 2022-11-15 RX ORDER — IPRATROPIUM BROMIDE AND ALBUTEROL SULFATE 2.5; .5 MG/3ML; MG/3ML
3 SOLUTION RESPIRATORY (INHALATION)
Status: DISCONTINUED | OUTPATIENT
Start: 2022-11-15 | End: 2022-11-18 | Stop reason: HOSPADM

## 2022-11-15 RX ORDER — FLUOXETINE 10 MG/1
10 CAPSULE ORAL 2 TIMES DAILY
Status: DISCONTINUED | OUTPATIENT
Start: 2022-11-15 | End: 2022-11-18 | Stop reason: HOSPADM

## 2022-11-15 RX ORDER — ROPINIROLE 0.5 MG/1
0.5 TABLET, FILM COATED ORAL 3 TIMES DAILY
Status: DISCONTINUED | OUTPATIENT
Start: 2022-11-15 | End: 2022-11-18 | Stop reason: HOSPADM

## 2022-11-15 RX ORDER — PREDNISONE 20 MG/1
40 TABLET ORAL DAILY
Status: DISCONTINUED | OUTPATIENT
Start: 2022-11-15 | End: 2022-11-15

## 2022-11-15 RX ORDER — POLYETHYLENE GLYCOL 3350 17 G/17G
1 POWDER, FOR SOLUTION ORAL
Status: DISCONTINUED | OUTPATIENT
Start: 2022-11-15 | End: 2022-11-18 | Stop reason: HOSPADM

## 2022-11-15 RX ORDER — METHYLPREDNISOLONE SODIUM SUCCINATE 125 MG/2ML
62.5 INJECTION, POWDER, LYOPHILIZED, FOR SOLUTION INTRAMUSCULAR; INTRAVENOUS DAILY
Status: DISCONTINUED | OUTPATIENT
Start: 2022-11-15 | End: 2022-11-15

## 2022-11-15 RX ORDER — ENOXAPARIN SODIUM 100 MG/ML
40 INJECTION SUBCUTANEOUS DAILY
Status: DISCONTINUED | OUTPATIENT
Start: 2022-11-15 | End: 2022-11-18 | Stop reason: HOSPADM

## 2022-11-15 RX ORDER — PREDNISONE 20 MG/1
40 TABLET ORAL DAILY
Status: DISCONTINUED | OUTPATIENT
Start: 2022-11-16 | End: 2022-11-18 | Stop reason: HOSPADM

## 2022-11-15 RX ORDER — PRAVASTATIN SODIUM 20 MG
20 TABLET ORAL DAILY
Status: DISCONTINUED | OUTPATIENT
Start: 2022-11-15 | End: 2022-11-18 | Stop reason: HOSPADM

## 2022-11-15 RX ADMIN — CARBOXYMETHYLCELLULOSE SODIUM 1 DROP: 5 SOLUTION/ DROPS OPHTHALMIC at 14:53

## 2022-11-15 RX ADMIN — METFORMIN HYDROCHLORIDE 1000 MG: 500 TABLET ORAL at 09:06

## 2022-11-15 RX ADMIN — DONEPEZIL HYDROCHLORIDE 10 MG: 5 TABLET, FILM COATED ORAL at 09:06

## 2022-11-15 RX ADMIN — CARBIDOPA AND LEVODOPA 2 TABLET: 25; 250 TABLET ORAL at 17:17

## 2022-11-15 RX ADMIN — INSULIN LISPRO 5 UNITS: 100 INJECTION, SOLUTION INTRAVENOUS; SUBCUTANEOUS at 09:28

## 2022-11-15 RX ADMIN — INSULIN GLARGINE-YFGN 40 UNITS: 100 INJECTION, SOLUTION SUBCUTANEOUS at 18:23

## 2022-11-15 RX ADMIN — INSULIN LISPRO 5 UNITS: 100 INJECTION, SOLUTION INTRAVENOUS; SUBCUTANEOUS at 13:11

## 2022-11-15 RX ADMIN — NYSTATIN: 100000 POWDER TOPICAL at 17:18

## 2022-11-15 RX ADMIN — INSULIN LISPRO 7 UNITS: 100 INJECTION, SOLUTION INTRAVENOUS; SUBCUTANEOUS at 18:23

## 2022-11-15 RX ADMIN — LOSARTAN POTASSIUM 100 MG: 50 TABLET, FILM COATED ORAL at 09:06

## 2022-11-15 RX ADMIN — CARBIDOPA AND LEVODOPA 1 TABLET: 50; 200 TABLET, EXTENDED RELEASE ORAL at 21:07

## 2022-11-15 RX ADMIN — METHYLPREDNISOLONE SODIUM SUCCINATE 125 MG: 125 INJECTION, POWDER, FOR SOLUTION INTRAMUSCULAR; INTRAVENOUS at 03:15

## 2022-11-15 RX ADMIN — PRAVASTATIN SODIUM 20 MG: 20 TABLET ORAL at 09:05

## 2022-11-15 RX ADMIN — ENOXAPARIN SODIUM 40 MG: 40 INJECTION SUBCUTANEOUS at 09:03

## 2022-11-15 RX ADMIN — INSULIN LISPRO 7 UNITS: 100 INJECTION, SOLUTION INTRAVENOUS; SUBCUTANEOUS at 09:29

## 2022-11-15 RX ADMIN — METHYLPREDNISOLONE SODIUM SUCCINATE 62.5 MG: 125 INJECTION, POWDER, FOR SOLUTION INTRAMUSCULAR; INTRAVENOUS at 09:03

## 2022-11-15 RX ADMIN — TAMSULOSIN HYDROCHLORIDE 0.4 MG: 0.4 CAPSULE ORAL at 11:18

## 2022-11-15 RX ADMIN — CARBOXYMETHYLCELLULOSE SODIUM 1 DROP: 5 SOLUTION/ DROPS OPHTHALMIC at 09:52

## 2022-11-15 RX ADMIN — CARBOXYMETHYLCELLULOSE SODIUM 1 DROP: 5 SOLUTION/ DROPS OPHTHALMIC at 21:07

## 2022-11-15 RX ADMIN — ROPINIROLE HYDROCHLORIDE 0.5 MG: 0.5 TABLET, FILM COATED ORAL at 09:06

## 2022-11-15 RX ADMIN — IPRATROPIUM BROMIDE AND ALBUTEROL SULFATE 3 ML: .5; 2.5 SOLUTION RESPIRATORY (INHALATION) at 18:58

## 2022-11-15 RX ADMIN — INSULIN LISPRO 7 UNITS: 100 INJECTION, SOLUTION INTRAVENOUS; SUBCUTANEOUS at 13:11

## 2022-11-15 RX ADMIN — FLUOXETINE 10 MG: 10 CAPSULE ORAL at 22:27

## 2022-11-15 RX ADMIN — AZITHROMYCIN MONOHYDRATE 500 MG: 250 TABLET ORAL at 06:40

## 2022-11-15 RX ADMIN — FLUOXETINE 10 MG: 10 CAPSULE ORAL at 13:12

## 2022-11-15 RX ADMIN — INSULIN LISPRO 3 UNITS: 100 INJECTION, SOLUTION INTRAVENOUS; SUBCUTANEOUS at 18:24

## 2022-11-15 RX ADMIN — ROPINIROLE HYDROCHLORIDE 0.5 MG: 0.5 TABLET, FILM COATED ORAL at 17:18

## 2022-11-15 RX ADMIN — CARBIDOPA AND LEVODOPA 2 TABLET: 25; 250 TABLET ORAL at 09:52

## 2022-11-15 RX ADMIN — OMEPRAZOLE 20 MG: 20 CAPSULE, DELAYED RELEASE ORAL at 09:07

## 2022-11-15 RX ADMIN — ENOXAPARIN SODIUM 40 MG: 40 INJECTION SUBCUTANEOUS at 17:18

## 2022-11-15 RX ADMIN — DONEPEZIL HYDROCHLORIDE 10 MG: 5 TABLET, FILM COATED ORAL at 17:17

## 2022-11-15 RX ADMIN — NYSTATIN: 100000 POWDER TOPICAL at 14:53

## 2022-11-15 RX ADMIN — NYSTATIN: 100000 POWDER TOPICAL at 11:18

## 2022-11-15 RX ADMIN — INSULIN LISPRO 5 UNITS: 100 INJECTION, SOLUTION INTRAVENOUS; SUBCUTANEOUS at 21:07

## 2022-11-15 RX ADMIN — ALBUTEROL SULFATE 5 MG: 2.5 SOLUTION RESPIRATORY (INHALATION) at 03:15

## 2022-11-15 RX ADMIN — IPRATROPIUM BROMIDE AND ALBUTEROL SULFATE 3 ML: .5; 2.5 SOLUTION RESPIRATORY (INHALATION) at 10:45

## 2022-11-15 ASSESSMENT — ENCOUNTER SYMPTOMS
HEADACHES: 1
PALPITATIONS: 0
SORE THROAT: 0
WHEEZING: 1
SPUTUM PRODUCTION: 1
NECK PAIN: 0
FOCAL WEAKNESS: 0
BLURRED VISION: 0
DIZZINESS: 0
ORTHOPNEA: 1
FEVER: 1
COUGH: 1
PND: 1
DIARRHEA: 0
WEAKNESS: 1
HEMOPTYSIS: 0
DOUBLE VISION: 1
BACK PAIN: 0
CHILLS: 0
NAUSEA: 0
FEVER: 0
WHEEZING: 0
WEAKNESS: 0
VOMITING: 0
ABDOMINAL PAIN: 0
CONSTIPATION: 0
MYALGIAS: 0
DEPRESSION: 0
NERVOUS/ANXIOUS: 0
HEARTBURN: 0
SHORTNESS OF BREATH: 1

## 2022-11-15 ASSESSMENT — PATIENT HEALTH QUESTIONNAIRE - PHQ9
2. FEELING DOWN, DEPRESSED, IRRITABLE, OR HOPELESS: NOT AT ALL
1. LITTLE INTEREST OR PLEASURE IN DOING THINGS: NOT AT ALL
SUM OF ALL RESPONSES TO PHQ9 QUESTIONS 1 AND 2: 0

## 2022-11-15 ASSESSMENT — COGNITIVE AND FUNCTIONAL STATUS - GENERAL
WALKING IN HOSPITAL ROOM: TOTAL
DRESSING REGULAR UPPER BODY CLOTHING: TOTAL
HELP NEEDED FOR BATHING: TOTAL
TURNING FROM BACK TO SIDE WHILE IN FLAT BAD: UNABLE
MOVING TO AND FROM BED TO CHAIR: UNABLE
DRESSING REGULAR LOWER BODY CLOTHING: TOTAL
SUGGESTED CMS G CODE MODIFIER DAILY ACTIVITY: CM
PERSONAL GROOMING: TOTAL
MOVING FROM LYING ON BACK TO SITTING ON SIDE OF FLAT BED: UNABLE
EATING MEALS: A LOT
STANDING UP FROM CHAIR USING ARMS: TOTAL
TOILETING: TOTAL
DAILY ACTIVITIY SCORE: 7

## 2022-11-15 ASSESSMENT — COPD QUESTIONNAIRES
DURING THE PAST 4 WEEKS HOW MUCH DID YOU FEEL SHORT OF BREATH: NONE/LITTLE OF THE TIME
DO YOU EVER COUGH UP ANY MUCUS OR PHLEGM?: NO/ONLY WITH OCCASIONAL COLDS OR INFECTIONS
COPD SCREENING SCORE: 4
HAVE YOU SMOKED AT LEAST 100 CIGARETTES IN YOUR ENTIRE LIFE: YES

## 2022-11-15 ASSESSMENT — PAIN DESCRIPTION - PAIN TYPE: TYPE: ACUTE PAIN;CHRONIC PAIN

## 2022-11-15 ASSESSMENT — FIBROSIS 4 INDEX
FIB4 SCORE: 2.76
FIB4 SCORE: 1.72

## 2022-11-15 ASSESSMENT — LIFESTYLE VARIABLES: EVER_SMOKED: YES

## 2022-11-15 NOTE — ED PROVIDER NOTES
"ED Provider Note    CHIEF COMPLAINT  Chief Complaint   Patient presents with    Shortness of Breath    Cough     PT reports SOB getting worse over past 4 days and increasing cough.  PT BIB [facility]        HPI  Mc Krishnamurthy is a 76 y.o. male who [facility] chief complaint of cough and shortness of breath is progressively worsening over the last 4 days.  He states he had a production of green sputum.  Has a history of COPD is not on home oxygen.  He states has been using albuterol without relief of symptoms.  He is felt may be flushed once received some of his medications given hot flashes so is unsure if he had a fever.  No chest pain no unilateral bilateral leg swelling no abdominal pain little bit of bloating.  He is COVID and flu vaccinated.  He was given 2 duo nebs by EMS and states he is feeling much better.      Underlying history of Parkinson's prostate disease diabetes hypertension    REVIEW OF SYSTEMS  Positives as above. Pertinent negatives include nausea vomiting fevers hemoptysis chest pain abdominal pain flank pain dysuria hematuria diarrhea constipation easy leading bruising unilateral bilateral leg swelling  All other review of systems are negative    PAST MEDICAL HISTORY  Parkinson's insulin-dependent diabetes COPD hypertension prostate cancer    SOCIAL HISTORY  Social History     Tobacco Use    Smoking status: Not on file    Smokeless tobacco: Not on file   Substance and Sexual Activity    Alcohol use: Not on file    Drug use: Not on file    Sexual activity: Not on file       SURGICAL HISTORY  patient denies any surgical history    CURRENT MEDICATIONS  Home Medications    **Home medications have not yet been reviewed for this encounter**         ALLERGIES  No Known Allergies    PHYSICAL EXAM  VITAL SIGNS: BP (!) 141/73   Pulse 80   Temp 36.9 °C (98.5 °F) (Temporal)   Resp 18   Ht 1.778 m (5' 10\")   Wt 104 kg (229 lb 4.5 oz)   SpO2 92%   BMI 32.90 kg/m²    Pulse ox " interpretation: I interpret this pulse ox as normal.  Constitutional: Alert in no apparent distress.  HENT: Normocephalic atraumatic, MMM  Eyes: PER, Conjunctiva normal, Non-icteric.   Neck: Normal range of motion, No tenderness, Supple, No stridor.   Cardiovascular: Regular rate and rhythm, no murmurs.   Thorax & Lungs: No tachypnea but bilateral wheezes heard throughout with some coarse rhonchi at the apexes no chest tenderness.   Abdomen: Bowel sounds normal, Soft, No tenderness, No pulsatile masses. No peritoneal signs.  Skin: Warm, Dry, No erythema, No rash.   Back: No bony tenderness, No CVA tenderness.   Extremities/MSK: Intact equal distal pulses, No edema, No tenderness, No cyanosis, no major deformities noted rhythmic shaking left upper extremity which is chronic  Neurologic: Alert and oriented x3, No focal deficits noted.       DIFFERENTIAL DIAGNOSIS AND WORK UP PLAN    This is a 76 y.o. male who presents with likely acute COPD exacerbation versus bronchitis specially with change in sputum.  He is not currently hypoxic and is on room air but he is still wheezing quite a bit without tachypnea.  He will be given an IV nebulizer treatment as well as some steroids laboratory analysis including a COVID evaluation and evaluation for ACS although I doubt that he is not any chest pain.  I also doubt pulmonary embolism as the patient's physical examination is most likely consistent with the reactive airway illness.  His well score is 0    DIAGNOSTIC STUDIES / PROCEDURES    EKG  No results found for this or any previous visit.    LABS  Pertinent Lab Findings    Labs Reviewed   CBC WITH DIFFERENTIAL - Abnormal; Notable for the following components:       Result Value    RBC 4.47 (*)     Hematocrit 40.9 (*)     Platelet Count 145 (*)     All other components within normal limits    Narrative:     Biotin intake of greater than 5 mg per day may interfere with  troponin levels, causing false low values.   COV-2, FLU  A/B, AND RSV BY PCR ([facility]   COMP METABOLIC PANEL    Narrative:     Biotin intake of greater than 5 mg per day may interfere with  troponin levels, causing false low values.   TROPONIN    Narrative:     Biotin intake of greater than 5 mg per day may interfere with  troponin levels, causing false low values.   PROBRAIN NATRIURETIC PEPTIDE, NT    Narrative:     Biotin intake of greater than 5 mg per day may interfere with  troponin levels, causing false low values.       RADIOLOGY  DX-CHEST-PORTABLE (1 VIEW)   Final Result      1.  Mildly enlarged cardiac silhouette with vascular congestion/edema.        The radiologist's interpretation of all radiological studies have been reviewed by me.      COURSE & MEDICAL DECISION MAKING  Pertinent Labs & Imaging studies reviewed. (See chart for details)    3:26 AM  Patient tolerated recurrent nebulizer treatment well.  Not requiring oxygen at this time and resting comfortably with an improved respiratory status.    4:11 AM  Patient has been consistently in the 80s for the last half an hour we will restart him on some nasal cannula.  He is a little bit more shaky after the repeat nebulizer treatments we will hold off for now he is not in any respiratory distress by believe this is a COPD exacerbation or pending his viral panel that could be an underlying bronchitis especially with the sputum change but no elevated white blood cell count no evidence of a bacterial pneumonia.  The patient understands plan for hospitalization    Fortunately his proBNP and troponins are within normal limits I have low concern pulmonary embolus based on his physical examination    I verified that the patient was wearing a mask and I was wearing appropriate PPE every time I entered the room. The patient's mask was on the patient at all times during my encounter except for a brief view of the oropharynx.          FINAL IMPRESSION  1.  Acute COPD with exacerbation  2.  Viral respiratory illness  3.   Hypoxia  4.  Underlying Parkinson's        Electronically signed by: Manuela Elmore M.D., 11/15/2022 2:49 AM    This dictation has been created using voice recognition software and/or scribes. The accuracy of the dictation is limited by the abilities of the software and the expertise of the scribes. I expect there may be some errors of grammar and possibly content. I made every attempt to manually correct the errors within my dictation. However, errors related to voice recognition software and/or scribes may still exist and should be interpreted within the appropriate context.

## 2022-11-15 NOTE — ASSESSMENT & PLAN NOTE
Tested positive for parainfluenza  Likely associated with viral PNA  Likely the cause of his COPD exacerbation  Should be treated conservatively with supportive measures only

## 2022-11-15 NOTE — CONSULTS
Pulmonary Consultation    Date of consult: 11/15/2022    Referring Physician  Osvaldo Mac M.D.    Reason for Consultation  Suspicion of COPD exacerbation    History of Presenting Illness  76 y.o. male who presented 11/15/2022 with history significant for Parkinson, hypertension, hyperlipidemia, COPD with noncompliance to medication, IDDM 2, who was admitted on 11/15/2022 with complaint of shortness of breath.  This has been worse on exertion without any chest pain.  Associated with increased cough with increased sputum production with color going from clear to green and with wheezing.  [facility], he was diagnosed with [facility], but he has not been taking his medication as prescribed.  There is no immediate record as far as what his last FEV1 is.  There is no orthopnea but also symptoms of sleep apnea with snoring at night and multiple naps and fatigue during the day.  PND without any lower extremity edema.  He says to have been around people that are sick but cannot tell what they have.  No recent traveling.  On admission, he needed 4 L to maintain proper oxygenation whereas at home he does not use any oxygen.  There was no leukocytosis on initial lab.  He tested negative for COVID but tested positive for parainfluenza.  His chest x-ray showed mildly enlarged cardiac silhouette with vascular congestion/edema.  EKG and troponin without evidence of ACS.  BNP barely elevated at 249.  Procalcitonin WNL at 0.14.  He was admitted on suspicion of COPD exacerbation.  He was started on DuoNeb, albuterol and methylprednisone and azithromycin with symptomatic improvement although still on 3 L of oxygen.  Pulmonary service was consulted for management of COPD.  Patient used to be a smoker and quit several decades ago.    Code Status  Full Code    Review of Systems  Review of Systems   Constitutional:  Negative for chills and fever.   Respiratory:  Positive for cough, sputum  production, shortness of breath and wheezing.    Cardiovascular:  Positive for orthopnea and PND. Negative for chest pain and leg swelling.   Gastrointestinal:  Negative for heartburn and nausea.   Neurological:  Positive for weakness.     Past Medical History   has no past medical history on file.  No past medical history on file.     Surgical History   has no past surgical history on file.  No past surgical history on file.     Family History  Family history reviewed and no significant conditions or diseases relevant to the chief complaint were identified    Social History  Former smoker    Medications  Home Medications    **Home medications have not yet been reviewed for this encounter**       Current Facility-Administered Medications   Medication Dose Route Frequency Provider Last Rate Last Admin    senna-docusate (PERICOLACE or SENOKOT S) 8.6-50 MG per tablet 2 Tablet  2 Tablet Oral BID Edward Viera M.D.        And    polyethylene glycol/lytes (MIRALAX) PACKET 1 Packet  1 Packet Oral QDAY PRN Edward Viera M.D.        And    magnesium hydroxide (MILK OF MAGNESIA) suspension 30 mL  30 mL Oral QDAY PRN Edward Viera M.D.        And    bisacodyl (DULCOLAX) suppository 10 mg  10 mg Rectal QDAY PRN Edward Viera M.D.        Respiratory Therapy Consult   Nebulization Continuous RT Edward Viera M.D.        enoxaparin (Lovenox) inj 40 mg  40 mg Subcutaneous DAILY AT 1800 Edward Viera M.D.        acetaminophen (Tylenol) tablet 650 mg  650 mg Oral Q6HRS PRN Edward Viera M.D.        ipratropium-albuterol (DUONEB) nebulizer solution  3 mL Nebulization Q4HRS (RT) Edward Viera M.D.        ipratropium-albuterol (DUONEB) nebulizer solution  3 mL Nebulization Q2HRS PRN (RT) Edward Viera M.D.        albuterol inhaler 2 Puff  2 Puff Inhalation Q4HRS PRN Edward Viera M.D.        carbidopa-levodopa (SINEMET)  MG tablet 2 Tablet  2 Tablet Oral Q6HRS Edward NELSON  SHON Viera        carbidopa-levodopa SR (SINEMET CR)  MG tablet 1 Tablet  1 Tablet Oral QHS Edward Viera M.D.        carboxymethylcellulose (REFRESH TEARS) 0.5 % ophthalmic drops 1 Drop  1 Drop Both Eyes TID Edward Viera M.D.        donepezil (ARICEPT) tablet 10 mg  10 mg Oral BID Edward Viera M.D.        FLUoxetine (PROZAC) capsule 10 mg  10 mg Oral BID Edward Viera M.D.        losartan (COZAAR) tablet 100 mg  100 mg Oral DAILY Edward Viera M.D.        metFORMIN (GLUCOPHAGE) tablet 1,000 mg  1,000 mg Oral BID WITH MEALS Edward Viera M.D.        pravastatin (PRAVACHOL) tablet 20 mg  20 mg Oral DAILY Edward Viera M.D.        omeprazole (PRILOSEC) capsule 20 mg  20 mg Oral DAILY Edward Viera M.D.        tamsulosin (FLOMAX) capsule 0.4 mg  0.4 mg Oral AFTER BREAKFAST Edward Viera M.D.        methylPREDNISolone sod succ (SOLU-MEDROL) 125 MG injection 62.5 mg  62.5 mg Intravenous DAILY Edward Viera M.D.        azithromycin (ZITHROMAX) tablet 500 mg  500 mg Oral DAILY Edward Viera M.D.   500 mg at 11/15/22 0640    ROPINIRole (REQUIP) tablet 0.5 mg  0.5 mg Oral TID Edward Viera M.D.        insulin GLARGINE (Lantus,Semglee) injection  40 Units Subcutaneous Q EVENING Edward Viera M.D.        And    insulin lispro (AdmeLOG,HumaLOG) injection  0.2 Units/kg/day Subcutaneous TID AC Edward Viera M.D.        And    insulin lispro (AdmeLOG,HumaLOG) injection  2-9 Units Subcutaneous 4X/DAY ACHS Edward Viera M.D.        And    dextrose 10 % BOLUS 25 g  25 g Intravenous Q15 MIN PRN Edward Viera M.D.           Allergies  No Known Allergies    Vital Signs last 24 hours  Temp:  [36.7 °C (98.1 °F)-37 °C (98.6 °F)] 37 °C (98.6 °F)  Pulse:  [77-96] 91  Resp:  [18-24] 20  BP: (127-149)/(59-82) 149/80  SpO2:  [87 %-94 %] 91 %    Physical Exam  Physical Exam  Constitutional:       Appearance: He is ill-appearing.   HENT:       Head: Normocephalic.      Mouth/Throat:      Mouth: Mucous membranes are moist.   Cardiovascular:      Rate and Rhythm: Normal rate and regular rhythm.      Heart sounds: No murmur heard.  Pulmonary:      Effort: No respiratory distress.      Breath sounds: Wheezing and rhonchi present.   Chest:      Chest wall: No tenderness.   Musculoskeletal:      Left lower leg: No edema.   Neurological:      Mental Status: He is alert and oriented to person, place, and time.       Fluids  No intake or output data in the 24 hours ending 11/15/22 0822    Laboratory  Recent Results (from the past 48 hour(s))   CBC WITH DIFFERENTIAL    Collection Time: 11/15/22  3:20 AM   Result Value Ref Range    WBC 7.6 4.8 - 10.8 K/uL    RBC 4.47 (L) 4.70 - 6.10 M/uL    Hemoglobin 14.1 14.0 - 18.0 g/dL    Hematocrit 40.9 (L) 42.0 - 52.0 %    MCV 91.5 81.4 - 97.8 fL    MCH 31.5 27.0 - 33.0 pg    MCHC 34.5 33.7 - 35.3 g/dL    RDW 43.8 35.9 - 50.0 fL    Platelet Count 145 (L) 164 - 446 K/uL    MPV 9.9 9.0 - 12.9 fL    Neutrophils-Polys 59.30 44.00 - 72.00 %    Lymphocytes 25.70 22.00 - 41.00 %    Monocytes 10.30 0.00 - 13.40 %    Eosinophils 3.40 0.00 - 6.90 %    Basophils 0.80 0.00 - 1.80 %    Immature Granulocytes 0.50 0.00 - 0.90 %    Nucleated RBC 0.00 /100 WBC    Neutrophils (Absolute) 4.48 1.82 - 7.42 K/uL    Lymphs (Absolute) 1.94 1.00 - 4.80 K/uL    Monos (Absolute) 0.78 0.00 - 0.85 K/uL    Eos (Absolute) 0.26 0.00 - 0.51 K/uL    Baso (Absolute) 0.06 0.00 - 0.12 K/uL    Immature Granulocytes (abs) 0.04 0.00 - 0.11 K/uL    NRBC (Absolute) 0.00 K/uL   COMP METABOLIC PANEL    Collection Time: 11/15/22  3:20 AM   Result Value Ref Range    Sodium 135 135 - 145 mmol/L    Potassium 3.8 3.6 - 5.5 mmol/L    Chloride 102 96 - 112 mmol/L    Co2 24 20 - 33 mmol/L    Anion Gap 9.0 7.0 - 16.0    Glucose 159 (H) 65 - 99 mg/dL    Bun 11 8 - 22 mg/dL    Creatinine 0.61 0.50 - 1.40 mg/dL    Calcium 8.2 (L) 8.5 - 10.5 mg/dL    AST(SGOT) 11 (L) 12 - 45  U/L    ALT(SGPT) 11 2 - 50 U/L    Alkaline Phosphatase 92 30 - 99 U/L    Total Bilirubin 0.4 0.1 - 1.5 mg/dL    Albumin 3.4 3.2 - 4.9 g/dL    Total Protein 6.3 6.0 - 8.2 g/dL    Globulin 2.9 1.9 - 3.5 g/dL    A-G Ratio 1.2 g/dL   TROPONIN    Collection Time: 11/15/22  3:20 AM   Result Value Ref Range    Troponin T 9 6 - 19 ng/L   proBrain Natriuretic Peptide, NT    Collection Time: 11/15/22  3:20 AM   Result Value Ref Range    NT-proBNP 249 (H) 0 - 125 pg/mL   CoV-2, FLU A/B, and RSV by PCR (2-4 Hours CEPHEID) : Collect NP swab in VTM    Collection Time: 11/15/22  3:20 AM    Specimen: Respirate   Result Value Ref Range    Influenza virus A RNA Negative Negative    Influenza virus B, PCR Negative Negative    RSV, PCR Negative Negative    SARS-CoV-2 by PCR NotDetected     SARS-CoV-2 Source NP Swab    ESTIMATED GFR    Collection Time: 11/15/22  3:20 AM   Result Value Ref Range    GFR (CKD-EPI) 100 >60 mL/min/1.73 m 2   MAGNESIUM    Collection Time: 11/15/22  3:20 AM   Result Value Ref Range    Magnesium 1.6 1.5 - 2.5 mg/dL   POCT glucose device results    Collection Time: 11/15/22  4:11 AM   Result Value Ref Range    POC Glucose, Blood 193 (H) 65 - 99 mg/dL   EKG (NOW)    Collection Time: 11/15/22  4:20 AM   Result Value Ref Range    Report       [facility] Emergency Dept.    Test Date:  2022-11-15  Pt Name:    BARBARA WALKER                 Department: ER  MRN:        [id number]                      Room:        04  Gender:     Male                         Technician: [id number]  :        1945                   Requested By:ADRIANA LOJA  Order #:    [id number]                    Reading MD:    Measurements  Intervals                                Axis  Rate:       91                           P:          55  CA:         144                          QRS:        12  QRSD:       99                           T:          69  QT:         375  QTc:        462    Interpretive Statements  Sinus  rhythm  Abnormal R-wave progression, early transition  Baseline wander in lead(s) I,III,aVL  No previous ECG available for comparison     POCT glucose device results    Collection Time: 11/15/22  6:44 AM   Result Value Ref Range    POC Glucose, Blood 256 (H) 65 - 99 mg/dL   EKG (NOW)    Collection Time: 11/15/22  7:23 AM   Result Value Ref Range    Report       [facility]    Test Date:  2022-11-15  Pt Name:    BARBARA WALKER                 Department: ER  MRN:        [id number]                      Room:       T728  Gender:     Male                         Technician: JESSIE  :        1945                   Requested By:ADRIANA LOJA  Order #:    [id number]                    Reading MD:    Measurements  Intervals                                Axis  Rate:       82                           P:          53  MT:         174                          QRS:        11  QRSD:       93                           T:          47  QT:         398  QTc:        465    Interpretive Statements  Sinus rhythm  Abnormal R-wave progression, early transition  Compared to ECG 11/15/2022 04:20:08  No significant changes       Imaging  DX-CHEST-PORTABLE (1 VIEW)   Final Result      1.  Mildly enlarged cardiac silhouette with vascular congestion/edema.          Assessment/Plan    * Acute respiratory failure with hypoxia (HCC)  Assessment & Plan  Patient was admitted with symptoms of shortness of breath, increased cough, increased sputum production with change of color and wheezing which make the cardinal signs of COPD exacerbation   The exacerbation is likely caused by a parainfluenza virus which was positive on the respiratory panel  No evidence of pneumonia based on chest x-ray or bacterial infection based on procalcitonin.   Although patient has orthopnea and PND, these are likely related to sleep apnea versus heart failure without any weight gain and without any lower extremity edema.  mMRC <2 and for a GOLD C with 8.2%  chances of mortality  Spirometry at the hospital shows an obstructive pattern: FEV1/FVC at 70.69 and FEV1% at 67    Recommendations  -Continue on albuterol, DuoNeb and consider Symbicort.    -Continue on oxygen as needed.    -Continue on azithromycin  -At discharge, patient would likely need triple therapy given that he is GOLD C  -Pulmonary will set up a follow-up appointment at the clinic.  -Will likely also need sleep study outpatient    Parainfluenza infection  Assessment & Plan  Tested positive for parainfluenza  Likely associated with viral PNA  Likely the cause of his COPD exacerbation  Should be treated conservatively with supportive measures only    COPD exacerbation (HCC)- (present on admission)  Assessment & Plan  Previously diagnosed at the [facility]  Non compliance to medication  Please see above for plan      Discussed patient condition and risk of morbidity and/or mortality with patient and pulm attending Dr Ramos

## 2022-11-15 NOTE — ASSESSMENT & PLAN NOTE
Insulin dependent DM2. No current complications.  -diabetic diet  -SSI  -glargine 40u qHS  -continue metformin 1000mg PO BID

## 2022-11-15 NOTE — ED TRIAGE NOTES
"Chief Complaint   Patient presents with    Shortness of Breath    Cough     PT reports SOB getting worse over past 4 days and increasing cough.  PT BIB [facility]      Blood Pressure (Abnormal) 141/73   Pulse 80   Temperature 36.9 °C (98.5 °F) (Temporal)   Respiration 18   Height 1.778 m (5' 10\")   Weight 104 kg (229 lb 4.5 oz)   Oxygen Saturation 92%   Body Mass Index 32.90 kg/m²     "

## 2022-11-15 NOTE — RESPIRATORY CARE
PULMONARY FUNCTION TEST by COPD CLINICAL EDUCATOR  11/15/2022 at 1:06 PM by Arely Naidu, RRT     PFT performed by COPD educator. Bedside PFT will reside in results tab on EMR and sent to inpatient pulmonary to interpret.     Lab Results   Component Value Date    FEV1 (L) 2.06 11/15/2022    FEV1/FVC % 70.69 11/15/2022    FVC % Predicted 71% 11/15/2022    FEV1 % Predicted 67 11/15/2022    FVC 2.91 11/15/2022    FEV1/FVC % Predicted 93 11/15/2022

## 2022-11-15 NOTE — H&P
"History & Physical Note    Date of Admission: 11/15/2022  Admission Status: Emergency  [facility] Team: UNSOM  Attending: No att. providers found   Senior Resident: Edward Viera M.D.  Contact Number: [phone]    Chief Complaint: \"I keep coughing up stuff and cant breathe.\"     History of Present Illness (HPI):   Mc is a 76 y.o. male presents with acute dyspnea, productive cough; hx of parkinsons, HTN/HLD, COPD, IDDM2, prostate CA, anxiety;  who presented 11/15/2022 with dyspnea, acute hypoxic respiratory failure, COPD exacerbation.    For the past 4 days, has been having shortness of breath, productive cough of yellow/green sputum, and general feeling sick. He has not tried any medications to control these symptoms, but overall presented as he was feeling worse from earlier this week. Has had multiple episodes where he's felt flushed, but no known temperature elevations. Previous tobacco use in remission, quit 45 years ago.    Review of Systems:   Review of Systems   Constitutional:  Positive for fever and malaise/fatigue. Negative for chills.   HENT:  Negative for congestion and sore throat.    Eyes:  Positive for double vision. Negative for blurred vision.   Respiratory:  Positive for cough, sputum production and shortness of breath. Negative for hemoptysis and wheezing.    Cardiovascular:  Negative for chest pain and palpitations.   Gastrointestinal:  Negative for abdominal pain, constipation, diarrhea, nausea and vomiting.   Genitourinary:  Negative for dysuria, frequency and urgency.   Musculoskeletal:  Negative for back pain, myalgias and neck pain.   Neurological:  Positive for headaches. Negative for dizziness, focal weakness and weakness.   Psychiatric/Behavioral:  Negative for depression. The patient is not nervous/anxious.        Past Medical History:   Past Medical History was reviewed with patient.   has no past medical history on file.    Past Surgical History: Past Surgical History was reviewed " with patient.   has no past surgical history on file.    Medications: Medications have been reviewed with patient.  Prior to Admission Medications   Prescriptions Last Dose Informant Patient Reported? Taking?   ANTACID ULTRA STRENGTH 1000 MG Chew Tab   No No   Sig: TAKE 2 TABLETS (2000MG) BY MOUTH THREE TIMES A DAY  AS NEEDED FOR DYSPEPSIA   Bacillus Coagulans-Inulin (PROBIOTIC FORMULA) 1-250 BILLION-MG Cap   No No   Sig: TAKE 1 CAPSULE BY MOUTH ONCE DAILY   Carbidopa-Levodopa ER (RYTARY) 48. MG Cap CR   Yes No   Sig: Take 3 Capsules by mouth 4 times a day. Administer 6am, 10am, 2pm, and 6pm   Cholecalciferol (VITAMIN D3) 125 MCG (5000 UT) Cap   No No   Sig: TAKE 1 CAPSULE BY MOUTH ONCE DAILY   Dextromethorphan-guaiFENesin (TUSSIN DM)  MG/5ML Syrup   No No   Sig: Take 10 mL by mouth every 8 hours as needed (For cough or congestion).   FLUoxetine (PROZAC) 10 MG Cap   No No   Sig: TAKE 1 CAPSULE BY MOUTH ONCE DAILY FOR DEPRESSION   Patient taking differently: Take 10 mg by mouth 2 times a day.   Insulin Aspart (NOVOLOG FLEXPEN SC)   Yes No   Sig: Inject 20 Units under the skin 2 times a day. Administer before breakfast and before dinner   Insulin Detemir (LEVEMIR FLEXPEN SC)   Yes No   Sig: Inject 60 Units under the skin every day.   Multiple Vitamin (MULTIVITAMIN) Tab   No No   Sig: TAKE 1 TABLET BY MOUTH ONCE DAILY   Nutritional Supplements (GLUCERNA ADVANCE SHAKE PO)   Yes No   Sig: Take 1 Can by mouth 1 time a day as needed.   Polyethylene Glycol 3350 (MIRALAX PO)   Yes No   Sig: Take 17 g by mouth 1 time a day as needed.   REFRESH PLUS 0.5 % Solution   No No   Sig: ADMINISTER 1 DROP INTO BOTH EYES IN THE MORNING, AT NOON, AND AT BEDTIME.   ROPINIRole (REQUIP) 0.5 MG Tab   Yes No   Sig: Take 0.5 mg by mouth 3 times a day.   Sharps Container (BD SHARPS  XL) Misc   No No   Si Container every 30 days.   ULTICARE MICRO PEN NEEDLES   No No   Sig: USE AS DIRECTED   acetaminophen (ACETAMINOPHEN  EXTRA STRENGTH) 500 MG Tab   No No   Sig: Take 2 Tablets by mouth every 8 hours as needed for Mild Pain, Moderate Pain or Fever.   albuterol 108 (90 Base) MCG/ACT Aero Soln inhalation aerosol   Yes No   Sig: Inhale 2 Puffs every four hours as needed for Shortness of Breath.   calcium carbonate (OS-ADAM 500) 1250 (500 Ca) MG Tab   Yes No   Sig: Take 2,000 mg by mouth 3 times a day as needed (dyspepsia).   carbidopa-levodopa SR (SINEMET CR)  MG per tablet   Yes No   Sig: Take 1 tablet by mouth at bedtime.   carboxymethylcellulose (REFRESH TEARS) 0.5 % Solution   No No   Sig: Administer 1 Drop into both eyes in the morning, at noon, and at bedtime.   cetirizine (ZYRTEC) 10 MG Tab   No No   Sig: TAKE 1 TABLET BY MOUTH ONCE DAILY FOR SEASONAL ALLERGIES   donepezil (ARICEPT) 10 MG tablet   No No   Sig: TAKE 1 TABLET BY MOUTH TWICE DAILY   glucose 4 GM chewable tablet   No No   Sig: Chew 1 Tablet as needed for Low Blood Sugar.   ketoconazole (NIZORAL) 2 % shampoo   Yes No   lactobacillus rhamnosus (CULTURELLE) Cap capsule   Yes No   Sig: Take 1 Capsule by mouth every morning with breakfast.   losartan (COZAAR) 100 MG Tab   No No   Sig: TAKE 1 TABLET BY MOUTH ONCE DAILY   magnesium hydroxide (MILK OF MAGNESIA) 400 MG/5ML Suspension   Yes No   Sig: Take 30 mL by mouth 2 times a day as needed.   melatonin 3 MG Tab   No No   Sig: TAKE 1 TABLET BY MOUTH AT BEDTIME   metformin (GLUCOPHAGE) 1000 MG tablet   No No   Sig: Take 1 Tablet by mouth 2 times a day with meals.   nystatin (MYCOSTATIN) powder   Yes No   Sig: Apply 1 Application topically 2 times a day as needed.   omeprazole (PRILOSEC) 20 MG delayed-release capsule   No No   Sig: TAKE 1 TABLET BY MOUTH ONE TIME A DAY FOR GERD/DYSPESIA   pravastatin (PRAVACHOL) 20 MG Tab   No No   Sig: Take 1 Tablet by mouth every day.   simethicone (MYLICON) 125 MG chewable tablet   No No   Sig: TAKE 2 TABLETS BY MOUTH TWICE DAILY AS NEEDED FOR BLOATING OR GAS   tamsulosin (FLOMAX) 0.4  MG capsule   No No   Sig: TAKE 1 CAPSULE BY MOUTH EVERY MORNING 30 MINUTES AFTER BREAKFAST   triamcinolone acetonide (KENALOG) 0.1 % Cream   Yes No      Facility-Administered Medications: None        Allergies: Allergies have been reviewed with patient.  No Known Allergies    Family History:   family history is not on file.     Social History:   Tobacco: quit in 1980   Alcohol: no current use   Recreational drugs (illegal and prescription):  never   Employment: n/a  Activity Level: walking, household activities   Living situation:  house  Primary Care Provider: reviewed Onelia Jamison P.A.-C.  Other (stressors, spirituality, exposures):        Physical Exam:   Vitals:  Temp:  [36.7 °C (98.1 °F)-36.9 °C (98.5 °F)] 36.7 °C (98.1 °F)  Pulse:  [77-96] 96  Resp:  [18-24] 24  BP: (127-141)/(59-73) 139/66  SpO2:  [87 %-92 %] 90 %    Physical Exam  Constitutional:       General: He is not in acute distress.     Appearance: He is not ill-appearing or diaphoretic.   HENT:      Head: Normocephalic and atraumatic.      Mouth/Throat:      Mouth: Mucous membranes are moist.      Pharynx: Oropharynx is clear.   Eyes:      Extraocular Movements: Extraocular movements intact.      Pupils: Pupils are equal, round, and reactive to light.   Cardiovascular:      Rate and Rhythm: Normal rate and regular rhythm.      Heart sounds: No murmur heard.    No friction rub. No gallop.   Pulmonary:      Comments: Expiratory wheeze, no crackles/rales  Abdominal:      General: There is distension.      Palpations: Abdomen is soft.      Tenderness: There is no abdominal tenderness. There is no guarding or rebound.   Musculoskeletal:         General: Normal range of motion.      Cervical back: Normal range of motion.   Skin:     General: Skin is warm and dry.   Neurological:      General: No focal deficit present.      Mental Status: He is alert and oriented to person, place, and time.      Cranial Nerves: No cranial nerve deficit.      Sensory: No  sensory deficit.      Motor: No weakness.   Psychiatric:         Mood and Affect: Mood normal.         Behavior: Behavior normal.         Thought Content: Thought content normal.         Judgment: Judgment normal.       Labs:   Recent Labs     11/15/22  0320   WBC 7.6   RBC 4.47*   HEMOGLOBIN 14.1   HEMATOCRIT 40.9*   MCV 91.5   MCH 31.5   RDW 43.8   PLATELETCT 145*   MPV 9.9   NEUTSPOLYS 59.30   LYMPHOCYTES 25.70   MONOCYTES 10.30   EOSINOPHILS 3.40   BASOPHILS 0.80     Lab Results   Component Value Date/Time    SODIUM 135 11/15/2022 03:20 AM    POTASSIUM 3.8 11/15/2022 03:20 AM    CHLORIDE 102 11/15/2022 03:20 AM    CO2 24 11/15/2022 03:20 AM    GLUCOSE 159 (H) 11/15/2022 03:20 AM    BUN 11 11/15/2022 03:20 AM    CREATININE 0.61 11/15/2022 03:20 AM          EKG: Per my read, sinus rhythm, no ST/T wave abnormalities, significant artifact.     Imaging:   CXR 11/15:  Prominent pulmonary vasculature, no consolidations    Previous Data Review: reviewed    Problem Representation:     Mc is a 76 y.o. male presents with acute dyspnea, productive cough; hx of parkinsons, HTN/HLD, COPD, IDDM2, prostate CA, anxiety;  who presented 11/15/2022 with dyspnea, acute hypoxic respiratory failure, COPD exacerbation.    * COPD exacerbation (HCC)- (present on admission)  Assessment & Plan  [facility], PFT current unavailable. 4 days of increasing dyspnea, cough, sputum production. Not on oxygen at home, currently requiring 4L for sats >90%.  -RT protocols  -supplemental oxygen to sats >88%  -duonebs q4h, q4h/PRN  -albuterol q4h/PRN  -solumedrol 62.5mg IV qD (5 days, ends 11/20)  -azithromycin 500mg PO qD 3 days    GERD (gastroesophageal reflux disease)- (present on admission)  Assessment & Plan  GERD, no current symptoms  -continue omeprazole 20mg PO qD    Hypertension- (present on admission)  Assessment & Plan  HTN history, acceptable BP in ED of 130s.  -continue losartan 100mg PO qD    Urinary incontinence-  (present on admission)  Assessment & Plan  Urinary incontinence, chronic, unchanged.  -tamsulosin 0.4mg PO qD    Type 2 diabetes mellitus without complications (HCC)- (present on admission)  Assessment & Plan  Insulin dependent DM2. No current complications.  -diabetic diet  -SSI  -glargine 40u qHS  -continue metformin 1000mg PO BID    Parkinson's disease (HCC)- (present on admission)  Assessment & Plan  Parkinsons, with debilitating tremour. [facility] normally.  -sinemet 50-100mg PO q6h (x3 daily)  -sinemet SR 50-100mg PO qHS  -ropinerole 0.5mg PO TID  -[facility]

## 2022-11-15 NOTE — ASSESSMENT & PLAN NOTE
Parkinsons, with debilitating tremour. [facility].  -sinemet 50-100mg PO q6h (x3 daily)  -sinemet SR 50-100mg PO qHS  -ropinerole 0.5mg PO TID  -[facility]

## 2022-11-15 NOTE — ASSESSMENT & PLAN NOTE
Patient was admitted with symptoms of shortness of breath, increased cough, increased sputum production with change of color and wheezing which make the cardinal signs of COPD exacerbation   The exacerbation is likely caused by a parainfluenza virus which was positive on the respiratory panel  No evidence of pneumonia based on chest x-ray or bacterial infection based on procalcitonin.   Although patient has orthopnea and PND, these are likely related to sleep apnea versus heart failure without any weight gain and without any lower extremity edema.  mMRC <2 and for a GOLD C with 8.2% chances of mortality  Spirometry at the hospital shows an obstructive pattern: FEV1/FVC at 70.69 and FEV1% at 67    Recommendations  -Continue on albuterol, DuoNeb and consider Symbicort.    -Continue on oxygen as needed.    -Continue on azithromycin  -At discharge, patient would likely need triple therapy given that he is GOLD C  -Pulmonary will set up a follow-up appointment at the clinic.  -Will likely also need sleep study outpatient

## 2022-11-15 NOTE — H&P
This pharmacist reached out to [facility] in which patient fills medications.  Confirmed all doses and frequencies, [facility].     Hali Mcdaniels, PharmD  [id number]

## 2022-11-15 NOTE — ASSESSMENT & PLAN NOTE
[facility], PFT current unavailable. 4 days of increasing dyspnea, cough, sputum production. Not on oxygen at home, currently requiring 4L for sats >90%.  -RT protocols  -supplemental oxygen to sats >88%  -duonebs q4h, q4h/PRN  -albuterol q4h/PRN  -solumedrol 62.5mg IV qD (5 days, ends 11/20)  -azithromycin 500mg PO qD 3 days  Wean off O2 as tolerated

## 2022-11-15 NOTE — ASSESSMENT & PLAN NOTE
Previously diagnosed at the [facility]  Non compliance to medication  Please see above for plan

## 2022-11-15 NOTE — PROGRESS NOTES
0615: Pt arrived to Tele 7 unit at this time. Alert and oriented, able to participate in discussion of care.

## 2022-11-15 NOTE — PROGRESS NOTES
CC: SOB    History of Present Illness (HPI):   Mc is a 76 y.o. male presents with acute dyspnea, productive cough; hx of parkinsons, HTN/HLD, COPD, IDDM2, prostate CA, anxiety;  who presented 11/15/2022 with dyspnea, acute hypoxic respiratory failure, COPD exacerbation.     For the past 4 days, has been having shortness of breath, productive cough of yellow/green sputum, and general feeling sick. He has not tried any medications to control these symptoms, but overall presented as he was feeling worse from earlier this week. Has had multiple episodes where he's felt flushed, but no known temperature elevations. Previous tobacco use in remission, quit 45 years ago.    Interval note  I saw and examined the patient by the bedside.  he was just admitted earlier today for COPD exacerbation.    Currently on 3 L oxygen  Feeling a little bit better  Continue breathing treatment around-the-clock with DuoNeb nebulizer, albuterol inhaler, methylprednisone  COVID and influenza negative  Check procalcitonin  On azithromycin    Parkinson: on sinemet

## 2022-11-15 NOTE — ASSESSMENT & PLAN NOTE
Patient was admitted with symptoms of shortness of breath, increased cough, increased sputum production with change of color and wheezing which make the cardinal signs of COPD exacerbation   The exacerbation is likely caused by a parainfluenza virus which was positive on the respiratory panel  No evidence of pneumonia based on chest x-ray or bacterial infection based on procalcitonin.   Although patient has orthopnea and PND, these are likely related to sleep apnea versus heart failure without any weight gain and without any lower extremity edema.  mMRC <2 and for a GOLD C with 8.2% chances of mortality  Spirometry at the hospital shows an obstructive pattern: FEV1/FVC at 70.69 and FEV1% at 67    Recommendations  -Continue on albuterol, DuoNeb and consider Symbicort.    -Continue on oxygen as needed.    -Continue on azithromycin  -At discharge, patient would likely need triple therapy given that he is GOLD C  -Pulmonary will set up a follow-up appointment at the clinic.

## 2022-11-15 NOTE — PROGRESS NOTES
Assumed care of patient from [facility]. Pt is A+O x4. No chest pain or SOB. No active bleeding noted. Informed of safety and call system. rhythm is Sinus rhythm. Completed 4 eye skin check. Pt unable to do med rec, [facility].

## 2022-11-15 NOTE — PROGRESS NOTES
4 Eyes Skin Assessment Completed by THOR Juarez and THOR Garcia.    Head WDL  Ears WDL  Nose WDL  Mouth WDL  Neck WDL  Breast/Chest WDL  Shoulder Blades WDL  Spine WDL  (R) Arm/Elbow/Hand WDL  (L) Arm/Elbow/Hand WDL  Abdomen WDL  Groin Excoriation and Rash please see picture  Scrotum/Coccyx/Buttocks Blanching, Excoriation, and Discoloration please see picture  (R) Leg WDL discoloration  (L) Leg WDL discoloration   (R) Heel/Foot/Toe WDL  (L) Heel/Foot/Toe WDL          Devices In Places Tele Box      Interventions In Place Gray Ear Foams, condom cath, floated pt with pillows     Possible Skin Injury Yes    Pictures Uploaded Into Epic Yes  Wound Consult Placed Yes  RN Wound Prevention Protocol Ordered Yes

## 2022-11-16 LAB
ALBUMIN SERPL BCP-MCNC: 3.5 G/DL (ref 3.2–4.9)
ALBUMIN/GLOB SERPL: 1.1 G/DL
ALP SERPL-CCNC: 87 U/L (ref 30–99)
ALT SERPL-CCNC: 9 U/L (ref 2–50)
ANION GAP SERPL CALC-SCNC: 12 MMOL/L (ref 7–16)
AST SERPL-CCNC: 10 U/L (ref 12–45)
BILIRUB SERPL-MCNC: 0.4 MG/DL (ref 0.1–1.5)
BUN SERPL-MCNC: 17 MG/DL (ref 8–22)
CALCIUM SERPL-MCNC: 9.1 MG/DL (ref 8.5–10.5)
CHLORIDE SERPL-SCNC: 101 MMOL/L (ref 96–112)
CO2 SERPL-SCNC: 26 MMOL/L (ref 20–33)
CREAT SERPL-MCNC: 0.83 MG/DL (ref 0.5–1.4)
ERYTHROCYTE [DISTWIDTH] IN BLOOD BY AUTOMATED COUNT: 43.7 FL (ref 35.9–50)
GFR SERPLBLD CREATININE-BSD FMLA CKD-EPI: 91 ML/MIN/1.73 M 2
GLOBULIN SER CALC-MCNC: 3.3 G/DL (ref 1.9–3.5)
GLUCOSE BLD STRIP.AUTO-MCNC: 259 MG/DL (ref 65–99)
GLUCOSE BLD STRIP.AUTO-MCNC: 278 MG/DL (ref 65–99)
GLUCOSE BLD STRIP.AUTO-MCNC: 284 MG/DL (ref 65–99)
GLUCOSE BLD STRIP.AUTO-MCNC: 301 MG/DL (ref 65–99)
GLUCOSE BLD STRIP.AUTO-MCNC: 349 MG/DL (ref 65–99)
GLUCOSE SERPL-MCNC: 198 MG/DL (ref 65–99)
HCT VFR BLD AUTO: 42.5 % (ref 42–52)
HGB BLD-MCNC: 14.6 G/DL (ref 14–18)
MCH RBC QN AUTO: 31.7 PG (ref 27–33)
MCHC RBC AUTO-ENTMCNC: 34.4 G/DL (ref 33.7–35.3)
MCV RBC AUTO: 92.2 FL (ref 81.4–97.8)
PLATELET # BLD AUTO: 185 K/UL (ref 164–446)
PMV BLD AUTO: 10.6 FL (ref 9–12.9)
POTASSIUM SERPL-SCNC: 4.2 MMOL/L (ref 3.6–5.5)
PROT SERPL-MCNC: 6.8 G/DL (ref 6–8.2)
RBC # BLD AUTO: 4.61 M/UL (ref 4.7–6.1)
SODIUM SERPL-SCNC: 139 MMOL/L (ref 135–145)
WBC # BLD AUTO: 14.6 K/UL (ref 4.8–10.8)

## 2022-11-16 PROCEDURE — 94664 DEMO&/EVAL PT USE INHALER: CPT

## 2022-11-16 PROCEDURE — 85027 COMPLETE CBC AUTOMATED: CPT

## 2022-11-16 PROCEDURE — A9270 NON-COVERED ITEM OR SERVICE: HCPCS | Performed by: INTERNAL MEDICINE

## 2022-11-16 PROCEDURE — 700111 HCHG RX REV CODE 636 W/ 250 OVERRIDE (IP): Performed by: STUDENT IN AN ORGANIZED HEALTH CARE EDUCATION/TRAINING PROGRAM

## 2022-11-16 PROCEDURE — 770020 HCHG ROOM/CARE - TELE (206)

## 2022-11-16 PROCEDURE — 36415 COLL VENOUS BLD VENIPUNCTURE: CPT

## 2022-11-16 PROCEDURE — 700111 HCHG RX REV CODE 636 W/ 250 OVERRIDE (IP): Performed by: INTERNAL MEDICINE

## 2022-11-16 PROCEDURE — 700101 HCHG RX REV CODE 250: Performed by: INTERNAL MEDICINE

## 2022-11-16 PROCEDURE — 700102 HCHG RX REV CODE 250 W/ 637 OVERRIDE(OP): Performed by: STUDENT IN AN ORGANIZED HEALTH CARE EDUCATION/TRAINING PROGRAM

## 2022-11-16 PROCEDURE — 82962 GLUCOSE BLOOD TEST: CPT

## 2022-11-16 PROCEDURE — 80053 COMPREHEN METABOLIC PANEL: CPT

## 2022-11-16 PROCEDURE — 94640 AIRWAY INHALATION TREATMENT: CPT

## 2022-11-16 PROCEDURE — 94669 MECHANICAL CHEST WALL OSCILL: CPT

## 2022-11-16 PROCEDURE — A9270 NON-COVERED ITEM OR SERVICE: HCPCS | Performed by: STUDENT IN AN ORGANIZED HEALTH CARE EDUCATION/TRAINING PROGRAM

## 2022-11-16 PROCEDURE — 99232 SBSQ HOSP IP/OBS MODERATE 35: CPT | Performed by: INTERNAL MEDICINE

## 2022-11-16 PROCEDURE — 700102 HCHG RX REV CODE 250 W/ 637 OVERRIDE(OP): Performed by: INTERNAL MEDICINE

## 2022-11-16 RX ORDER — IPRATROPIUM BROMIDE AND ALBUTEROL SULFATE 2.5; .5 MG/3ML; MG/3ML
3 SOLUTION RESPIRATORY (INHALATION)
Status: DISCONTINUED | OUTPATIENT
Start: 2022-11-17 | End: 2022-11-18

## 2022-11-16 RX ADMIN — NYSTATIN: 100000 POWDER TOPICAL at 17:14

## 2022-11-16 RX ADMIN — ENOXAPARIN SODIUM 40 MG: 40 INJECTION SUBCUTANEOUS at 17:15

## 2022-11-16 RX ADMIN — CARBOXYMETHYLCELLULOSE SODIUM 1 DROP: 5 SOLUTION/ DROPS OPHTHALMIC at 21:52

## 2022-11-16 RX ADMIN — FLUOXETINE 10 MG: 10 CAPSULE ORAL at 21:52

## 2022-11-16 RX ADMIN — DONEPEZIL HYDROCHLORIDE 10 MG: 5 TABLET, FILM COATED ORAL at 06:18

## 2022-11-16 RX ADMIN — FLUOXETINE 10 MG: 10 CAPSULE ORAL at 09:27

## 2022-11-16 RX ADMIN — PREDNISONE 40 MG: 20 TABLET ORAL at 06:19

## 2022-11-16 RX ADMIN — CARBIDOPA AND LEVODOPA 1 TABLET: 50; 200 TABLET, EXTENDED RELEASE ORAL at 21:52

## 2022-11-16 RX ADMIN — LEVODOPA AND CARBIDOPA 3 CAPSULE: 195; 48.75 CAPSULE, EXTENDED RELEASE ORAL at 18:00

## 2022-11-16 RX ADMIN — LOSARTAN POTASSIUM 100 MG: 50 TABLET, FILM COATED ORAL at 06:18

## 2022-11-16 RX ADMIN — NYSTATIN: 100000 POWDER TOPICAL at 12:38

## 2022-11-16 RX ADMIN — TAMSULOSIN HYDROCHLORIDE 0.4 MG: 0.4 CAPSULE ORAL at 09:27

## 2022-11-16 RX ADMIN — INSULIN LISPRO 7 UNITS: 100 INJECTION, SOLUTION INTRAVENOUS; SUBCUTANEOUS at 12:56

## 2022-11-16 RX ADMIN — PRAVASTATIN SODIUM 20 MG: 20 TABLET ORAL at 06:19

## 2022-11-16 RX ADMIN — IPRATROPIUM BROMIDE AND ALBUTEROL SULFATE 3 ML: .5; 2.5 SOLUTION RESPIRATORY (INHALATION) at 07:25

## 2022-11-16 RX ADMIN — ROPINIROLE HYDROCHLORIDE 0.5 MG: 0.5 TABLET, FILM COATED ORAL at 17:14

## 2022-11-16 RX ADMIN — INSULIN LISPRO 6 UNITS: 100 INJECTION, SOLUTION INTRAVENOUS; SUBCUTANEOUS at 12:57

## 2022-11-16 RX ADMIN — LEVODOPA AND CARBIDOPA 3 CAPSULE: 195; 48.75 CAPSULE, EXTENDED RELEASE ORAL at 06:00

## 2022-11-16 RX ADMIN — IPRATROPIUM BROMIDE AND ALBUTEROL SULFATE 3 ML: .5; 2.5 SOLUTION RESPIRATORY (INHALATION) at 18:57

## 2022-11-16 RX ADMIN — INSULIN LISPRO 6 UNITS: 100 INJECTION, SOLUTION INTRAVENOUS; SUBCUTANEOUS at 21:50

## 2022-11-16 RX ADMIN — LEVODOPA AND CARBIDOPA 3 CAPSULE: 195; 48.75 CAPSULE, EXTENDED RELEASE ORAL at 10:00

## 2022-11-16 RX ADMIN — INSULIN LISPRO 7 UNITS: 100 INJECTION, SOLUTION INTRAVENOUS; SUBCUTANEOUS at 09:26

## 2022-11-16 RX ADMIN — SENNOSIDES AND DOCUSATE SODIUM 2 TABLET: 50; 8.6 TABLET ORAL at 06:19

## 2022-11-16 RX ADMIN — IPRATROPIUM BROMIDE AND ALBUTEROL SULFATE 3 ML: .5; 2.5 SOLUTION RESPIRATORY (INHALATION) at 14:49

## 2022-11-16 RX ADMIN — OMEPRAZOLE 20 MG: 20 CAPSULE, DELAYED RELEASE ORAL at 06:19

## 2022-11-16 RX ADMIN — ROPINIROLE HYDROCHLORIDE 0.5 MG: 0.5 TABLET, FILM COATED ORAL at 00:44

## 2022-11-16 RX ADMIN — LEVODOPA AND CARBIDOPA 3 CAPSULE: 195; 48.75 CAPSULE, EXTENDED RELEASE ORAL at 12:00

## 2022-11-16 RX ADMIN — INSULIN LISPRO 7 UNITS: 100 INJECTION, SOLUTION INTRAVENOUS; SUBCUTANEOUS at 18:21

## 2022-11-16 RX ADMIN — DONEPEZIL HYDROCHLORIDE 10 MG: 5 TABLET, FILM COATED ORAL at 17:14

## 2022-11-16 RX ADMIN — AZITHROMYCIN MONOHYDRATE 500 MG: 250 TABLET ORAL at 06:18

## 2022-11-16 RX ADMIN — CARBOXYMETHYLCELLULOSE SODIUM 1 DROP: 5 SOLUTION/ DROPS OPHTHALMIC at 17:15

## 2022-11-16 RX ADMIN — CARBOXYMETHYLCELLULOSE SODIUM 1 DROP: 5 SOLUTION/ DROPS OPHTHALMIC at 09:29

## 2022-11-16 RX ADMIN — INSULIN GLARGINE-YFGN 40 UNITS: 100 INJECTION, SOLUTION SUBCUTANEOUS at 18:21

## 2022-11-16 RX ADMIN — INSULIN LISPRO 5 UNITS: 100 INJECTION, SOLUTION INTRAVENOUS; SUBCUTANEOUS at 09:25

## 2022-11-16 RX ADMIN — INSULIN LISPRO 5 UNITS: 100 INJECTION, SOLUTION INTRAVENOUS; SUBCUTANEOUS at 18:21

## 2022-11-16 RX ADMIN — NYSTATIN: 100000 POWDER TOPICAL at 06:20

## 2022-11-16 RX ADMIN — BUDESONIDE AND FORMOTEROL FUMARATE DIHYDRATE 2 PUFF: 160; 4.5 AEROSOL RESPIRATORY (INHALATION) at 19:08

## 2022-11-16 RX ADMIN — ROPINIROLE HYDROCHLORIDE 0.5 MG: 0.5 TABLET, FILM COATED ORAL at 09:27

## 2022-11-16 ASSESSMENT — ENCOUNTER SYMPTOMS
COUGH: 1
NAUSEA: 0
SHORTNESS OF BREATH: 1
DEPRESSION: 0
FEVER: 1
WEAKNESS: 0
NECK PAIN: 0
HEADACHES: 1
WHEEZING: 0
VOMITING: 0
NERVOUS/ANXIOUS: 0
HEMOPTYSIS: 0
BLURRED VISION: 0
BACK PAIN: 0
DIZZINESS: 0
MYALGIAS: 0
SPUTUM PRODUCTION: 1
PALPITATIONS: 0
DOUBLE VISION: 1
FOCAL WEAKNESS: 0
CONSTIPATION: 0
CHILLS: 0
SORE THROAT: 0
ABDOMINAL PAIN: 0
DIARRHEA: 0

## 2022-11-16 ASSESSMENT — COGNITIVE AND FUNCTIONAL STATUS - GENERAL
WALKING IN HOSPITAL ROOM: TOTAL
TURNING FROM BACK TO SIDE WHILE IN FLAT BAD: A LOT
SUGGESTED CMS G CODE MODIFIER DAILY ACTIVITY: CM
DAILY ACTIVITIY SCORE: 8
DRESSING REGULAR LOWER BODY CLOTHING: TOTAL
DRESSING REGULAR UPPER BODY CLOTHING: TOTAL
CLIMB 3 TO 5 STEPS WITH RAILING: TOTAL
TOILETING: TOTAL
SUGGESTED CMS G CODE MODIFIER MOBILITY: CM
EATING MEALS: A LOT
MOBILITY SCORE: 7
MOVING FROM LYING ON BACK TO SITTING ON SIDE OF FLAT BED: UNABLE
STANDING UP FROM CHAIR USING ARMS: TOTAL
PERSONAL GROOMING: A LOT
HELP NEEDED FOR BATHING: TOTAL
MOVING TO AND FROM BED TO CHAIR: UNABLE

## 2022-11-16 ASSESSMENT — LIFESTYLE VARIABLES
EVER HAD A DRINK FIRST THING IN THE MORNING TO STEADY YOUR NERVES TO GET RID OF A HANGOVER: NO
HOW MANY TIMES IN THE PAST YEAR HAVE YOU HAD 5 OR MORE DRINKS IN A DAY: 0
CONSUMPTION TOTAL: NEGATIVE
HAVE YOU EVER FELT YOU SHOULD CUT DOWN ON YOUR DRINKING: NO
EVER FELT BAD OR GUILTY ABOUT YOUR DRINKING: NO
ON A TYPICAL DAY WHEN YOU DRINK ALCOHOL HOW MANY DRINKS DO YOU HAVE: 0
HAVE PEOPLE ANNOYED YOU BY CRITICIZING YOUR DRINKING: NO
TOTAL SCORE: 0
DOES PATIENT WANT TO STOP DRINKING: NO
AVERAGE NUMBER OF DAYS PER WEEK YOU HAVE A DRINK CONTAINING ALCOHOL: 0
ALCOHOL_USE: NO
TOTAL SCORE: 0
TOTAL SCORE: 0

## 2022-11-16 ASSESSMENT — PAIN DESCRIPTION - PAIN TYPE: TYPE: ACUTE PAIN;CHRONIC PAIN

## 2022-11-16 ASSESSMENT — FIBROSIS 4 INDEX: FIB4 SCORE: 1.72

## 2022-11-16 NOTE — CARE PLAN
The patient is Stable - Low risk of patient condition declining or worsening    Shift Goals  Clinical Goals: admission/med rec  Patient Goals: comfort  Family Goals: ANTONIETA    Progress made toward(s) clinical / shift goals:  wound and RT consulted     Patient is not progressing towards the following goals:

## 2022-11-16 NOTE — PROGRESS NOTES
Med Rec complete per patient's Medication Review Report  Asked for MAR twice from [facility], they only sent the active medications list so last doses are unknown  Allergies reviewed

## 2022-11-16 NOTE — CARE PLAN
Problem: Impaired Gas Exchange  Goal: Patient will demonstrate improved ventilation and adequate oxygenation and participate in treatment regimen within the level of ability/situation.  Outcome: Progressing     Problem: Knowledge Deficit - COPD  Goal: Patient/significant other demonstrates understanding of disease process, utilization of the Action Plan, medications and discharge instruction  Outcome: Progressing     Problem: Skin Integrity  Goal: Skin integrity is maintained or improved  Outcome: Progressing   The patient is Stable - Low risk of patient condition declining or worsening    Shift Goals  Clinical Goals: admission/med rec  Patient Goals: comfort  Family Goals: ANTONIETA    Progress made toward(s) clinical / shift goals:  RT and wound consulted     Patient is not progressing towards the following goals:

## 2022-11-16 NOTE — PROGRESS NOTES
[facility] Medicine Daily Progress Note    Date of Service  11/16/2022    Chief Complaint  Mc Krishnamurthy is a 76 y.o. male admitted 11/15/2022 with shortness of breath     Hospital Course  This is a 77 y/o M with PMHX of HTN, HLD< COPD IDDM2 who was admitted on 11/15/22 after presenting to ER complaining of SOB   For the past 4 days, has been having shortness of breath, productive cough of yellow/green sputum, and general feeling sick. He has not tried any medications to control these symptoms, but overall presented as he was feeling worse from earlier this week  Patient admitted for further treatment     Interval Problem Update  Patient seen and examined ,afebrile, no overnight events, still feeling SOB, cont on inhalers, and prednisone, wean off O2 as tolerated     I have discussed this patient's plan of care and discharge plan at IDT rounds today with Case Management, Nursing, Nursing leadership, and other members of the IDT team.    Consultants/Specialty  pulmonary    Code Status  Full Code    Disposition  Patient is not medically cleared for discharge.   Anticipate discharge to  TBD  .  I have placed the appropriate orders for post-discharge needs.    Review of Systems  Review of Systems   Constitutional:  Positive for fever and malaise/fatigue. Negative for chills.   HENT:  Negative for congestion and sore throat.    Eyes:  Positive for double vision. Negative for blurred vision.   Respiratory:  Positive for cough, sputum production and shortness of breath. Negative for hemoptysis and wheezing.    Cardiovascular:  Negative for chest pain and palpitations.   Gastrointestinal:  Negative for abdominal pain, constipation, diarrhea, nausea and vomiting.   Genitourinary:  Negative for dysuria, frequency and urgency.   Musculoskeletal:  Negative for back pain, myalgias and neck pain.   Neurological:  Positive for headaches. Negative for dizziness, focal weakness and weakness.   Psychiatric/Behavioral:  Negative for  depression. The patient is not nervous/anxious.       Physical Exam  Temp:  [36.3 °C (97.3 °F)-37.3 °C (99.1 °F)] 36.3 °C (97.3 °F)  Pulse:  [] 75  Resp:  [16-18] 18  BP: (105-136)/(60-85) 133/85  SpO2:  [91 %-94 %] 91 %    Physical Exam  Vitals and nursing note reviewed.   Constitutional:       General: He is not in acute distress.     Appearance: He is not ill-appearing or diaphoretic.   HENT:      Head: Normocephalic and atraumatic.      Mouth/Throat:      Mouth: Mucous membranes are moist.      Pharynx: Oropharynx is clear.   Eyes:      Extraocular Movements: Extraocular movements intact.      Pupils: Pupils are equal, round, and reactive to light.   Cardiovascular:      Rate and Rhythm: Normal rate and regular rhythm.      Heart sounds: No murmur heard.    No friction rub. No gallop.   Pulmonary:      Comments: Expiratory wheeze, no crackles/rales  Abdominal:      General: There is distension.      Palpations: Abdomen is soft.      Tenderness: There is no abdominal tenderness. There is no guarding or rebound.   Musculoskeletal:         General: Normal range of motion.      Cervical back: Normal range of motion.   Skin:     General: Skin is warm and dry.   Neurological:      General: No focal deficit present.      Mental Status: He is alert and oriented to person, place, and time.      Cranial Nerves: No cranial nerve deficit.      Sensory: No sensory deficit.      Motor: No weakness.   Psychiatric:         Mood and Affect: Mood normal.         Behavior: Behavior normal.         Thought Content: Thought content normal.         Judgment: Judgment normal.       Fluids    Intake/Output Summary (Last 24 hours) at 11/16/2022 1343  Last data filed at 11/16/2022 0900  Gross per 24 hour   Intake 1050 ml   Output 3350 ml   Net -2300 ml       Laboratory  Recent Labs     11/15/22  0320 11/16/22  0310   WBC 7.6 14.6*   RBC 4.47* 4.61*   HEMOGLOBIN 14.1 14.6   HEMATOCRIT 40.9* 42.5   MCV 91.5 92.2   MCH 31.5 31.7    MCHC 34.5 34.4   RDW 43.8 43.7   PLATELETCT 145* 185   MPV 9.9 10.6     Recent Labs     11/15/22  0320 11/16/22  0310   SODIUM 135 139   POTASSIUM 3.8 4.2   CHLORIDE 102 101   CO2 24 26   GLUCOSE 159* 198*   BUN 11 17   CREATININE 0.61 0.83   CALCIUM 8.2* 9.1                   Imaging  DX-CHEST-PORTABLE (1 VIEW)   Final Result      1.  Mildly enlarged cardiac silhouette with vascular congestion/edema.           Assessment/Plan  COPD exacerbation (HCC)- (present on admission)  Assessment & Plan  [facility], [facility] current unavailable. 4 days of increasing dyspnea, cough, sputum production. Not on oxygen at home, currently requiring 4L for sats >90%.  -RT protocols  -supplemental oxygen to sats >88%  -duonebs q4h, q4h/PRN  -albuterol q4h/PRN  -solumedrol 62.5mg IV qD (5 days, ends 11/20)  -azithromycin 500mg PO qD 3 days  Wean off O2 as tolerated     GERD (gastroesophageal reflux disease)- (present on admission)  Assessment & Plan  GERD, no current symptoms  -continue omeprazole 20mg PO qD    Hypertension- (present on admission)  Assessment & Plan  HTN history, acceptable BP in ED of 130s.  -continue losartan 100mg PO qD    Urinary incontinence- (present on admission)  Assessment & Plan  Urinary incontinence, chronic, unchanged.  -tamsulosin 0.4mg PO qD    Type 2 diabetes mellitus without complications (HCC)- (present on admission)  Assessment & Plan  Insulin dependent DM2. No current complications.  -diabetic diet  -SSI  -glargine 40u qHS  -continue metformin 1000mg PO BID    Parkinson's disease (HCC)- (present on admission)  Assessment & Plan  Parkinsons, with debilitating tremour. [facility].  -sinemet 50-100mg PO q6h (x3 daily)  -sinemet SR 50-100mg PO qHS  -ropinerole 0.5mg PO TID  -[facility]       VTE prophylaxis: enoxaparin ppx    I have performed a physical exam and reviewed and updated ROS and Plan today (11/16/2022). In review of yesterday's  note (11/15/2022), there are no changes except as documented above.

## 2022-11-16 NOTE — WOUND TEAM
[facility]  Inpatient Services  Wound and Skin Care Brief Evaluation    Admission Date: 11/15/2022     Last order of IP CONSULT TO WOUND CARE was found on 11/15/2022 from Hospital Encounter on 11/15/2022     HPI, PMH, SH: Reviewed    Chief Complaint   Patient presents with    Shortness of Breath    Cough     PT reports SOB getting worse over past 4 days and increasing cough.  PT BIB [facility]      Diagnosis: COPD exacerbation (HCC) [J44.1]    Unit where seen by Wound Team: T728/02     Wound consult placed regarding bilateral groin/pannus and sacrum. Chart and images reviewed. This RN in to assess patient. Pt pleasant and agreeable. MASD with yeast-like appearance to bilateral groin/pannus region. Nystatin powder and interdry already in place. Area cleansed and more nystatin powder applied and interdry replaced. Sacrum with blanchable erythema and small area of open partial thickness injury r/t friction. No pressure injuries or advanced wound care needs identified. Wound consult completed. Wound team signing off, re-consult if patient has further advanced wound care needs or if current wounds deteriorate.            RSKIN:   CURRENTLY IN PLACE (X), APPLIED THIS VISIT (A), ORDERED (O):   Q shift Fermin:  X  Q shift pressure point assessments:  X    Surface/Positioning   Pressure redistribution mattress          X  Low Airloss          Bariatric foam      Bariatric AWAIS     Waffle cushion        Waffle Overlay          Reposition q 2 hours      TAPs Turning system     Z Bryce Pillow     Offloading/Redistribution NA  Sacral Mepilex (Silicone dressing)     Heel Mepilex (Silicone dressing)         Heel float boots (Prevalon boot)             Float Heels off Bed with Pillows           Respiratory   Silicone O2 tubing       X  Gray Foam Ear protectors   X  Cannula fixation Device (Tender )          High flow offloading Clip    Elastic head band offloading device      Anchorfast                                                          Trach with Optifoam split foam             Containment/Moisture Prevention     Rectal tube or BMS    Purwick/Condom Cath        Blair Catheter    Barrier wipes         O  Barrier paste     A  Antifungal tx    X  Interdry    X    Mobilization       Up to chair      X  Ambulate      PT/OT      Nutrition       Dietician        Diabetes Education      PO  X   TF     TPN     NPO   # days

## 2022-11-16 NOTE — DISCHARGE PLANNING
[facility] Transitional [facility] Coordination    Referral from: VÍCTOR Alexander    Insurance Provider on Facesheet: [facility]    Potential Rehab Diagnosis: Debility    Chart review indicates patient may have on going medical management and may have therapy needs to possibly meet inpatient rehab facility criteria with the goal of returning to community.    D/C support will need to be verified: None listed    Physiatry consultation pended per protocol.  TX pending.     Thank you for the referral.

## 2022-11-16 NOTE — CARE PLAN
The patient is Stable - Low risk of patient condition declining or worsening    Shift Goals  Clinical Goals: Maintain O2 sat > 90% on 2L NC, bath, skin care  Patient Goals: Eat dinner  Family Goals: ANTONIETA      Problem: Impaired Gas Exchange  Goal: Patient will demonstrate improved ventilation and adequate oxygenation and participate in treatment regimen within the level of ability/situation.  Outcome: Progressing     Problem: Skin Integrity  Goal: Skin integrity is maintained or improved  Outcome: Progressing       Progress made toward(s) clinical / shift goals:  All goals met at this time. No increased o2 NEEDS.    Patient is not progressing towards the following goals: n/a

## 2022-11-16 NOTE — PROGRESS NOTES
Assumed care of patient. Pt is A+O x4. No chest pain or SOB. No active bleeding noted. Informed of safety and call system. rhythm is Sinus Rhythm. No concerns at this time.

## 2022-11-16 NOTE — RESPIRATORY CARE
COPD EDUCATION by COPD CLINICAL EDUCATOR  11/16/2022  at  12:02 PM by Nona Khan, RRT     Patient interviewed by COPD education team.  Patient unable to participate in full program.  A short intervention has been conducted.  A comprehensive packet including information about COPD, types of treatments to manage their disease and safe home Oxygen usage was provided and reviewed with patient at the bedside. [facility] is [facility] and declined pulmonary referral.  An Oxygen-need walk test is pending. IP Pulmonary saw patient and agrees with plan. We discussed our RPM program briefly. He lives in assisted living but may need SNF for discharge.    COPD Screen  COPD Risk Screening  Do you have a history of COPD?: Yes  Do you have a Pulmonologist?: No  COPD Population Screener  During the past 4 weeks, how much did you feel short of breath?: None/Little of the time  Do you ever cough up any mucus or phlegm?: No/only with occasional colds or infections  In the past 12 months, you do less than you used to because of your breathing problems: Disagree/unsure  Have you smoked at least 100 cigarettes in your entire life?: Yes  How old are you?: 60+  COPD Screening Score: 4  COPD Coordinator Not Recommended: Yes    COPD Assessment  COPD Clinical Specialists ONLY  COPD Education Initiated: Yes--Short Intervention (pleasant pt with Parkinsons here with [facility]. possible RPM if not to SNF on DC order requested has smart phone)  DME Company: (none prior) walk test pending  DME Equipment Type: none prior  Physician Name: TEODORA JusticeGSC  and [facility]  Pulmonologist Name: [facility] declined information  Referrals Initiated: Yes  Pulmonary Rehab: Yes (by admitting team)  Smoking Cessation: N/A (quit 1977)  Palliative Care:  (not ordered, never seen)  Hospice: N/A  Home Health Care:  (lives at assisted living; 6 clicks shows higher need choice pending)  [facility] Community Outreach: N/A  [facility]iatric Specialty Group: Yes  Dispatch  "Health: N/A  Is this a COPD exacerbation patient?: Yes (IP Pulmonary notified and agrees.)  $ Demo/Eval of SVN's, MDI's and Aerosols: Yes  $ Bedside PFT Screen: Yes (see results above)  (OP) Pulmonary Function Testing: Yes (Data not available thru [facility] system)    PFT Results    Lab Results   Component Value Date    FEV1 (L) 2.06 11/15/2022    FEV1/FVC % 70.69 11/15/2022    FVC % Predicted 71% 11/15/2022    FEV1 % Predicted 67 11/15/2022    FVC 2.91 11/15/2022    FEV1/FVC % Predicted 93 11/15/2022       Meds to Beds  Would the patient like to opt in for Bedside Medication Delivery at Discharge?: Yes, interested     MY COPD ACTION PLAN     It is recommended that patients and physicians /healthcare providers complete this action plan together. This plan should be discussed at each physician visit and updated as needed.    The green, yellow and red zones show groups of symptoms of COPD. This list of symptoms is not comprehensive, and you may experience other symptoms. In the \"Actions\" column, your healthcare provider has recommended actions for you to take based on your symptoms.    Patient Name: Mc Krishnamurthy   YOB: 1945   Last Updated on:     Green Zone:  I am doing well today Actions   •  Usual activitiy and exercise level •  Take daily medications   •  Usual amounts of cough and phlegm/mucus •  Use oxygen as prescribed   •  Sleep well at night •  Continue regular exercise/diet plan   •  Appetite is good •  At all times avoid cigarette smoke, inhaled irritants     Daily Medications (these medications are taken every day):                Yellow Zone:  I am having a bad day or a COPD flare Actions   •  More breathless than usual •  Continue daily medications   •  I have less energy for my daily activities •  Use quick relief inhaler as ordered   •  Increased or thicker phlegm/mucus •  Use oxygen as prescribed   •  Using quick relief inhaler/nebulizer more often •  Get plenty of rest   •  Swelling of ankles " "more than usual •  Use pursed lip breathing   •  More coughing than usual •  At all times avoid cigarette smoke, inhaled irritants   •  I feel like I have a \"chest cold\"   •  Poor sleep and my symptoms woke me up   •  My appetite is not good   •  My medicine is not helping    •  Call provider immediately if symptoms don’t improve     Continue daily medications, add rescue medications:   Albuterol 2 Puffs Every 4 hours PRN       Medications to be used during a flare up, (as Discussed with Provider):           Additional Information:  Use the spacer with your Albuterol rescue inhaler.    Red Zone:  I need urgent medical care Actions   •  Severe shortness of breath even at rest •  Call 911 or seek medical care immediately   •  Not able to do any activity because of breathing    •  Fever or shaking chills    •  Feeling confused or very drowsy     •  Chest pains    •  Coughing up blood                  "

## 2022-11-16 NOTE — CARE PLAN
Problem: Bronchoconstriction  Goal: Improve in air movement and diminished wheezing  Description: Target End Date:  2 to 3 days    1.  Implement inhaled treatments  2.  Evaluate and manage medication effects  Outcome: Progressing       Respiratory Update    Treatment modality: svn/flutter  Frequency:   Q4 while awake     Pt tolerating current treatments well with no adverse reactions.

## 2022-11-17 LAB
ANION GAP SERPL CALC-SCNC: 10 MMOL/L (ref 7–16)
BUN SERPL-MCNC: 29 MG/DL (ref 8–22)
CALCIUM SERPL-MCNC: 9.3 MG/DL (ref 8.5–10.5)
CHLORIDE SERPL-SCNC: 100 MMOL/L (ref 96–112)
CO2 SERPL-SCNC: 28 MMOL/L (ref 20–33)
CREAT SERPL-MCNC: 1.06 MG/DL (ref 0.5–1.4)
ERYTHROCYTE [DISTWIDTH] IN BLOOD BY AUTOMATED COUNT: 43.8 FL (ref 35.9–50)
GFR SERPLBLD CREATININE-BSD FMLA CKD-EPI: 73 ML/MIN/1.73 M 2
GLUCOSE BLD STRIP.AUTO-MCNC: 169 MG/DL (ref 65–99)
GLUCOSE BLD STRIP.AUTO-MCNC: 216 MG/DL (ref 65–99)
GLUCOSE BLD STRIP.AUTO-MCNC: 281 MG/DL (ref 65–99)
GLUCOSE BLD STRIP.AUTO-MCNC: 355 MG/DL (ref 65–99)
GLUCOSE SERPL-MCNC: 195 MG/DL (ref 65–99)
HCT VFR BLD AUTO: 42.6 % (ref 42–52)
HGB BLD-MCNC: 14.5 G/DL (ref 14–18)
MCH RBC QN AUTO: 31.5 PG (ref 27–33)
MCHC RBC AUTO-ENTMCNC: 34 G/DL (ref 33.7–35.3)
MCV RBC AUTO: 92.6 FL (ref 81.4–97.8)
PLATELET # BLD AUTO: 194 K/UL (ref 164–446)
PMV BLD AUTO: 10.1 FL (ref 9–12.9)
POTASSIUM SERPL-SCNC: 4.5 MMOL/L (ref 3.6–5.5)
RBC # BLD AUTO: 4.6 M/UL (ref 4.7–6.1)
SODIUM SERPL-SCNC: 138 MMOL/L (ref 135–145)
WBC # BLD AUTO: 11.7 K/UL (ref 4.8–10.8)

## 2022-11-17 PROCEDURE — 36415 COLL VENOUS BLD VENIPUNCTURE: CPT

## 2022-11-17 PROCEDURE — 99232 SBSQ HOSP IP/OBS MODERATE 35: CPT | Performed by: INTERNAL MEDICINE

## 2022-11-17 PROCEDURE — 700102 HCHG RX REV CODE 250 W/ 637 OVERRIDE(OP): Performed by: STUDENT IN AN ORGANIZED HEALTH CARE EDUCATION/TRAINING PROGRAM

## 2022-11-17 PROCEDURE — 82962 GLUCOSE BLOOD TEST: CPT | Mod: 91

## 2022-11-17 PROCEDURE — A9270 NON-COVERED ITEM OR SERVICE: HCPCS | Performed by: INTERNAL MEDICINE

## 2022-11-17 PROCEDURE — 700101 HCHG RX REV CODE 250: Performed by: INTERNAL MEDICINE

## 2022-11-17 PROCEDURE — 770020 HCHG ROOM/CARE - TELE (206)

## 2022-11-17 PROCEDURE — 94640 AIRWAY INHALATION TREATMENT: CPT

## 2022-11-17 PROCEDURE — 94669 MECHANICAL CHEST WALL OSCILL: CPT

## 2022-11-17 PROCEDURE — A9270 NON-COVERED ITEM OR SERVICE: HCPCS | Performed by: STUDENT IN AN ORGANIZED HEALTH CARE EDUCATION/TRAINING PROGRAM

## 2022-11-17 PROCEDURE — 97535 SELF CARE MNGMENT TRAINING: CPT

## 2022-11-17 PROCEDURE — 97163 PT EVAL HIGH COMPLEX 45 MIN: CPT

## 2022-11-17 PROCEDURE — 85027 COMPLETE CBC AUTOMATED: CPT

## 2022-11-17 PROCEDURE — 700102 HCHG RX REV CODE 250 W/ 637 OVERRIDE(OP): Performed by: INTERNAL MEDICINE

## 2022-11-17 PROCEDURE — 700111 HCHG RX REV CODE 636 W/ 250 OVERRIDE (IP): Performed by: INTERNAL MEDICINE

## 2022-11-17 PROCEDURE — 97166 OT EVAL MOD COMPLEX 45 MIN: CPT

## 2022-11-17 PROCEDURE — 80048 BASIC METABOLIC PNL TOTAL CA: CPT

## 2022-11-17 PROCEDURE — 700111 HCHG RX REV CODE 636 W/ 250 OVERRIDE (IP): Performed by: STUDENT IN AN ORGANIZED HEALTH CARE EDUCATION/TRAINING PROGRAM

## 2022-11-17 RX ORDER — INSULIN LISPRO 100 [IU]/ML
2-9 INJECTION, SOLUTION INTRAVENOUS; SUBCUTANEOUS
Status: DISCONTINUED | OUTPATIENT
Start: 2022-11-17 | End: 2022-11-18 | Stop reason: HOSPADM

## 2022-11-17 RX ORDER — INSULIN LISPRO 100 [IU]/ML
9 INJECTION, SOLUTION INTRAVENOUS; SUBCUTANEOUS
Status: DISCONTINUED | OUTPATIENT
Start: 2022-11-17 | End: 2022-11-18 | Stop reason: HOSPADM

## 2022-11-17 RX ADMIN — ROPINIROLE HYDROCHLORIDE 0.5 MG: 0.5 TABLET, FILM COATED ORAL at 01:59

## 2022-11-17 RX ADMIN — CARBOXYMETHYLCELLULOSE SODIUM 1 DROP: 5 SOLUTION/ DROPS OPHTHALMIC at 21:23

## 2022-11-17 RX ADMIN — PRAVASTATIN SODIUM 20 MG: 20 TABLET ORAL at 05:50

## 2022-11-17 RX ADMIN — ENOXAPARIN SODIUM 40 MG: 40 INJECTION SUBCUTANEOUS at 17:48

## 2022-11-17 RX ADMIN — IPRATROPIUM BROMIDE AND ALBUTEROL SULFATE 3 ML: .5; 2.5 SOLUTION RESPIRATORY (INHALATION) at 19:32

## 2022-11-17 RX ADMIN — INSULIN LISPRO 2 UNITS: 100 INJECTION, SOLUTION INTRAVENOUS; SUBCUTANEOUS at 09:57

## 2022-11-17 RX ADMIN — NYSTATIN: 100000 POWDER TOPICAL at 05:51

## 2022-11-17 RX ADMIN — INSULIN LISPRO 5 UNITS: 100 INJECTION, SOLUTION INTRAVENOUS; SUBCUTANEOUS at 17:42

## 2022-11-17 RX ADMIN — IPRATROPIUM BROMIDE AND ALBUTEROL SULFATE 3 ML: .5; 2.5 SOLUTION RESPIRATORY (INHALATION) at 14:32

## 2022-11-17 RX ADMIN — FLUOXETINE 10 MG: 10 CAPSULE ORAL at 08:13

## 2022-11-17 RX ADMIN — ROPINIROLE HYDROCHLORIDE 0.5 MG: 0.5 TABLET, FILM COATED ORAL at 08:14

## 2022-11-17 RX ADMIN — LEVODOPA AND CARBIDOPA 3 CAPSULE: 195; 48.75 CAPSULE, EXTENDED RELEASE ORAL at 19:00

## 2022-11-17 RX ADMIN — INSULIN LISPRO 3 UNITS: 100 INJECTION, SOLUTION INTRAVENOUS; SUBCUTANEOUS at 21:42

## 2022-11-17 RX ADMIN — PREDNISONE 40 MG: 20 TABLET ORAL at 05:48

## 2022-11-17 RX ADMIN — LEVODOPA AND CARBIDOPA 3 CAPSULE: 195; 48.75 CAPSULE, EXTENDED RELEASE ORAL at 10:00

## 2022-11-17 RX ADMIN — BUDESONIDE AND FORMOTEROL FUMARATE DIHYDRATE 2 PUFF: 160; 4.5 AEROSOL RESPIRATORY (INHALATION) at 08:13

## 2022-11-17 RX ADMIN — AZITHROMYCIN MONOHYDRATE 500 MG: 250 TABLET ORAL at 05:50

## 2022-11-17 RX ADMIN — INSULIN LISPRO 7 UNITS: 100 INJECTION, SOLUTION INTRAVENOUS; SUBCUTANEOUS at 12:00

## 2022-11-17 RX ADMIN — INSULIN LISPRO 8 UNITS: 100 INJECTION, SOLUTION INTRAVENOUS; SUBCUTANEOUS at 12:01

## 2022-11-17 RX ADMIN — INSULIN LISPRO 9 UNITS: 100 INJECTION, SOLUTION INTRAVENOUS; SUBCUTANEOUS at 17:44

## 2022-11-17 RX ADMIN — INSULIN LISPRO 7 UNITS: 100 INJECTION, SOLUTION INTRAVENOUS; SUBCUTANEOUS at 09:56

## 2022-11-17 RX ADMIN — BUDESONIDE AND FORMOTEROL FUMARATE DIHYDRATE 2 PUFF: 160; 4.5 AEROSOL RESPIRATORY (INHALATION) at 19:32

## 2022-11-17 RX ADMIN — NYSTATIN: 100000 POWDER TOPICAL at 20:00

## 2022-11-17 RX ADMIN — FLUOXETINE 10 MG: 10 CAPSULE ORAL at 21:24

## 2022-11-17 RX ADMIN — CARBIDOPA AND LEVODOPA 1 TABLET: 50; 200 TABLET, EXTENDED RELEASE ORAL at 22:59

## 2022-11-17 RX ADMIN — DONEPEZIL HYDROCHLORIDE 10 MG: 5 TABLET, FILM COATED ORAL at 05:49

## 2022-11-17 RX ADMIN — IPRATROPIUM BROMIDE AND ALBUTEROL SULFATE 3 ML: .5; 2.5 SOLUTION RESPIRATORY (INHALATION) at 08:27

## 2022-11-17 RX ADMIN — CARBOXYMETHYLCELLULOSE SODIUM 1 DROP: 5 SOLUTION/ DROPS OPHTHALMIC at 14:46

## 2022-11-17 RX ADMIN — LEVODOPA AND CARBIDOPA 3 CAPSULE: 195; 48.75 CAPSULE, EXTENDED RELEASE ORAL at 14:00

## 2022-11-17 RX ADMIN — LEVODOPA AND CARBIDOPA 3 CAPSULE: 195; 48.75 CAPSULE, EXTENDED RELEASE ORAL at 06:00

## 2022-11-17 RX ADMIN — TAMSULOSIN HYDROCHLORIDE 0.4 MG: 0.4 CAPSULE ORAL at 08:14

## 2022-11-17 RX ADMIN — ROPINIROLE HYDROCHLORIDE 0.5 MG: 0.5 TABLET, FILM COATED ORAL at 17:48

## 2022-11-17 RX ADMIN — DONEPEZIL HYDROCHLORIDE 10 MG: 5 TABLET, FILM COATED ORAL at 17:48

## 2022-11-17 RX ADMIN — LOSARTAN POTASSIUM 100 MG: 50 TABLET, FILM COATED ORAL at 05:49

## 2022-11-17 RX ADMIN — OMEPRAZOLE 20 MG: 20 CAPSULE, DELAYED RELEASE ORAL at 05:49

## 2022-11-17 RX ADMIN — CARBOXYMETHYLCELLULOSE SODIUM 1 DROP: 5 SOLUTION/ DROPS OPHTHALMIC at 08:14

## 2022-11-17 ASSESSMENT — COGNITIVE AND FUNCTIONAL STATUS - GENERAL
SUGGESTED CMS G CODE MODIFIER DAILY ACTIVITY: CK
HELP NEEDED FOR BATHING: A LOT
PERSONAL GROOMING: A LITTLE
DAILY ACTIVITIY SCORE: 16
DRESSING REGULAR LOWER BODY CLOTHING: A LOT
SUGGESTED CMS G CODE MODIFIER MOBILITY: CI
MOBILITY SCORE: 23
DRESSING REGULAR UPPER BODY CLOTHING: A LITTLE
EATING MEALS: A LITTLE
CLIMB 3 TO 5 STEPS WITH RAILING: A LITTLE
TOILETING: A LITTLE

## 2022-11-17 ASSESSMENT — GAIT ASSESSMENTS
ASSISTIVE DEVICE: 4 WHEEL WALKER
GAIT LEVEL OF ASSIST: SUPERVISED
DISTANCE (FEET): 40

## 2022-11-17 ASSESSMENT — ENCOUNTER SYMPTOMS
SHORTNESS OF BREATH: 1
CONSTIPATION: 0
DOUBLE VISION: 1
BACK PAIN: 0
CHILLS: 0
WHEEZING: 0
NECK PAIN: 0
DIZZINESS: 0
NAUSEA: 0
FEVER: 1
NERVOUS/ANXIOUS: 0
DIARRHEA: 0
VOMITING: 0
FOCAL WEAKNESS: 0
ABDOMINAL PAIN: 0
BLURRED VISION: 0
HEMOPTYSIS: 0
WEAKNESS: 0
SORE THROAT: 0
COUGH: 1
DEPRESSION: 0
MYALGIAS: 0
HEADACHES: 1
PALPITATIONS: 0
SPUTUM PRODUCTION: 1

## 2022-11-17 ASSESSMENT — ACTIVITIES OF DAILY LIVING (ADL): TOILETING: REQUIRES ASSIST

## 2022-11-17 NOTE — CARE PLAN
The patient is Stable - Low risk of patient condition declining or worsening    Shift Goals  Clinical Goals: No falls  Patient Goals: Sleep  Family Goals: ANTONIETA      Problem: Impaired Gas Exchange  Goal: Patient will demonstrate improved ventilation and adequate oxygenation and participate in treatment regimen within the level of ability/situation.  Outcome: Progressing     Problem: Skin Integrity  Goal: Skin integrity is maintained or improved  Outcome: Progressing     Problem: Fall Risk  Goal: Patient will remain free from falls  Outcome: Progressing       Progress made toward(s) clinical / shift goals:  Goals met    Patient is not progressing towards the following goals:

## 2022-11-17 NOTE — PROGRESS NOTES
[facility] Medicine Daily Progress Note    Date of Service  11/17/2022    Chief Complaint  Mc Krishnamurthy is a 76 y.o. male admitted 11/15/2022 with shortness of breath     Hospital Course  This is a 75 y/o M with PMHX of HTN, HLD< COPD IDDM2 who was admitted on 11/15/22 after presenting to ER complaining of SOB   For the past 4 days, has been having shortness of breath, productive cough of yellow/green sputum, and general feeling sick. He has not tried any medications to control these symptoms, but overall presented as he was feeling worse from earlier this week  Patient admitted for further treatment     Interval Problem Update  Patient resting in bed ,afebrile, no overnight events, still feeling SOB, cont on inhalers, and prednisone, wean off O2 as tolerated     I have discussed this patient's plan of care and discharge plan at IDT rounds today with Case Management, Nursing, Nursing leadership, and other members of the IDT team.    Consultants/Specialty  pulmonary    Code Status  Full Code    Disposition  Patient is not medically cleared for discharge.   Anticipate discharge to  TBD  .  I have placed the appropriate orders for post-discharge needs.    Review of Systems  Review of Systems   Constitutional:  Positive for fever and malaise/fatigue. Negative for chills.   HENT:  Negative for congestion and sore throat.    Eyes:  Positive for double vision. Negative for blurred vision.   Respiratory:  Positive for cough, sputum production and shortness of breath. Negative for hemoptysis and wheezing.    Cardiovascular:  Negative for chest pain and palpitations.   Gastrointestinal:  Negative for abdominal pain, constipation, diarrhea, nausea and vomiting.   Genitourinary:  Negative for dysuria, frequency and urgency.   Musculoskeletal:  Negative for back pain, myalgias and neck pain.   Neurological:  Positive for headaches. Negative for dizziness, focal weakness and weakness.   Psychiatric/Behavioral:  Negative for  depression. The patient is not nervous/anxious.       Physical Exam  Temp:  [36.6 °C (97.9 °F)-36.8 °C (98.2 °F)] 36.7 °C (98.1 °F)  Pulse:  [64-99] 99  Resp:  [16-20] 20  BP: ()/(53-81) 100/56  SpO2:  [88 %-93 %] 93 %    Physical Exam  Vitals and nursing note reviewed.   Constitutional:       General: He is not in acute distress.     Appearance: He is not ill-appearing or diaphoretic.   HENT:      Head: Normocephalic and atraumatic.      Mouth/Throat:      Mouth: Mucous membranes are moist.      Pharynx: Oropharynx is clear.   Eyes:      Extraocular Movements: Extraocular movements intact.      Pupils: Pupils are equal, round, and reactive to light.   Cardiovascular:      Rate and Rhythm: Normal rate and regular rhythm.      Heart sounds: No murmur heard.    No friction rub. No gallop.   Pulmonary:      Comments: Expiratory wheeze, no crackles/rales  Abdominal:      General: There is distension.      Palpations: Abdomen is soft.      Tenderness: There is no abdominal tenderness. There is no guarding or rebound.   Musculoskeletal:         General: Normal range of motion.      Cervical back: Normal range of motion.   Skin:     General: Skin is warm and dry.   Neurological:      General: No focal deficit present.      Mental Status: He is alert and oriented to person, place, and time.      Cranial Nerves: No cranial nerve deficit.      Sensory: No sensory deficit.      Motor: No weakness.   Psychiatric:         Mood and Affect: Mood normal.         Behavior: Behavior normal.         Thought Content: Thought content normal.         Judgment: Judgment normal.       Fluids    Intake/Output Summary (Last 24 hours) at 11/17/2022 1536  Last data filed at 11/17/2022 1300  Gross per 24 hour   Intake 540 ml   Output 1000 ml   Net -460 ml       Laboratory  Recent Labs     11/15/22  0320 11/16/22  0310 11/17/22  0119   WBC 7.6 14.6* 11.7*   RBC 4.47* 4.61* 4.60*   HEMOGLOBIN 14.1 14.6 14.5   HEMATOCRIT 40.9* 42.5 42.6    MCV 91.5 92.2 92.6   MCH 31.5 31.7 31.5   MCHC 34.5 34.4 34.0   RDW 43.8 43.7 43.8   PLATELETCT 145* 185 194   MPV 9.9 10.6 10.1     Recent Labs     11/15/22  0320 11/16/22  0310 11/17/22  0119   SODIUM 135 139 138   POTASSIUM 3.8 4.2 4.5   CHLORIDE 102 101 100   CO2 24 26 28   GLUCOSE 159* 198* 195*   BUN 11 17 29*   CREATININE 0.61 0.83 1.06   CALCIUM 8.2* 9.1 9.3                   Imaging  DX-CHEST-PORTABLE (1 VIEW)   Final Result      1.  Mildly enlarged cardiac silhouette with vascular congestion/edema.           Assessment/Plan  COPD exacerbation (HCC)- (present on admission)  Assessment & Plan  [facility]ild [facility], PFT current unavailable. 4 days of increasing dyspnea, cough, sputum production. Not on oxygen at home, currently requiring 4L for sats >90%.  -RT protocols  -supplemental oxygen to sats >88%  -duonebs q4h, q4h/PRN  -albuterol q4h/PRN  -solumedrol 62.5mg IV qD (5 days, ends 11/20)  -azithromycin 500mg PO qD 3 days  Wean off O2 as tolerated     GERD (gastroesophageal reflux disease)- (present on admission)  Assessment & Plan  GERD, no current symptoms  -continue omeprazole 20mg PO qD    Hypertension- (present on admission)  Assessment & Plan  HTN history, acceptable BP in ED of 130s.  -continue losartan 100mg PO qD    Urinary incontinence- (present on admission)  Assessment & Plan  Urinary incontinence, chronic, unchanged.  -tamsulosin 0.4mg PO qD    Type 2 diabetes mellitus without complications (HCC)- (present on admission)  Assessment & Plan  Insulin dependent DM2. No current complications.  -diabetic diet  -SSI  -glargine 40u qHS  -continue metformin 1000mg PO BID    Parkinson's disease (HCC)- (present on admission)  Assessment & Plan  Parkinsons, with debilitating tremour. [facility].  -sinemet 50-100mg PO q6h (x3 daily)  -sinemet SR 50-100mg PO qHS  -ropinerole 0.5mg PO TID  -follow [facility] at [facility]       VTE prophylaxis: enoxaparin ppx    I have  performed a physical exam and reviewed and updated ROS and Plan today (11/17/2022). In review of yesterday's note (11/16/2022), there are no changes except as documented above.

## 2022-11-17 NOTE — DISCHARGE PLANNING
RNCM PC to pt's [facility] [phone] to update medical clearance; no answer, left detailed msg for callback.    RNCM noted pt's insurance: confirmation acquired; [facility] is the Insurance contracted DME provider. Referral to be sent to [facility].

## 2022-11-17 NOTE — FACE TO FACE
Face to Face Supporting Documentation - Home Health    The encounter with this patient was in whole or in part the primary reason for home health admission.    Date of encounter:   Patient:                    MRN:                       YOB: 2021  Mc Krishnamurthy  [id number]  1945     Home health to see patient for:  Skilled Nursing care for assessment, interventions & education, Physical Therapy evaluation and treatment, and Occupational therapy evaluation and treatment    Skilled need for:  Comment: medication management     Skilled nursing interventions to include:  Comment: medication management     Homebound status evidenced by:  Need the aid of supportive devices such as crutches, canes, wheelchairs or walkers. Leaving home requires a considerable and taxing effort. There is a normal inability to leave the home.    Community Physician to provide follow up care: Onelia Jamison P.A.-C.     Optional Interventions? No      I certify the face to face encounter for this home health care referral meets the CMS requirements and the encounter/clinical assessment with the patient was, in whole, or in part, for the medical condition(s) listed above, which is the primary reason for home health care. Based on my clinical findings: the service(s) are medically necessary, support the need for home health care, and the homebound criteria are met.  I certify that this patient has had a face to face encounter by myself.  Luis Alexander M.D. - NPI: [id number]

## 2022-11-17 NOTE — DISCHARGE PLANNING
Received Choice form at 1501  Agency/Facility Name: [facility]   Referral sent per Choice form @ 1506     1530  Agency/Facility Name: [facility]   Outcome: DPA left Vmail regarding referral with request of call back.     1600  Agency/Facility Name: [facility]   Spoke to: [facility]  Outcome: Referral received, Per [facility] equipment will delivered tomorrow morning

## 2022-11-17 NOTE — CARE PLAN
The patient is Stable - Low risk of patient condition declining or worsening    Shift Goals  Clinical Goals: Lessen oxygen requirement  Patient Goals: d/c  Family Goals: keily    Progress made toward(s) clinical / shift goals:  yes    Patient is not progressing towards the following goals:      Problem: Knowledge Deficit - Standard  Goal: Patient and family/care givers will demonstrate understanding of plan of care, disease process/condition, diagnostic tests and medications  Outcome: Progressing     Problem: Knowledge Deficit - COPD  Goal: Patient/significant other demonstrates understanding of disease process, utilization of the Action Plan, medications and discharge instruction  Outcome: Progressing     Problem: Risk for Infection - COPD  Goal: Patient will remain free from signs and symptoms of infection  Outcome: Progressing     Problem: Nutrition - Advanced  Goal: Patient will display progressive weight gain toward goal have adequate food and fluid intake  Outcome: Progressing     Problem: Ineffective Airway Clearance  Goal: Patient will maintain patent airway with clear/clearing breath sounds  Outcome: Progressing     Problem: Impaired Gas Exchange  Goal: Patient will demonstrate improved ventilation and adequate oxygenation and participate in treatment regimen within the level of ability/situation.  Outcome: Progressing     Problem: Risk for Aspiration  Goal: Patient's risk for aspiration will be absent or decrease  Outcome: Progressing     Problem: Self Care  Goal: Patient will have the ability to perform ADLs independently or with assistance (bathe, groom, dress, toilet and feed)  Outcome: Progressing     Problem: Skin Integrity  Goal: Skin integrity is maintained or improved  Outcome: Progressing     Problem: Fall Risk  Goal: Patient will remain free from falls  Outcome: Progressing

## 2022-11-17 NOTE — THERAPY
Physical Therapy   Initial Evaluation     Patient Name: Mc Krishnamurthy  Age:  76 y.o., Sex:  male  Medical Record #: [id number]  Today's Date: 11/17/2022     Precautions  Precautions: Fall Risk    Assessment  Patient is 76 y.o. male  with acute dyspnea, productive cough; hx of parkinsons, HTN/HLD, COPD, IDDM2, prostate CA, anxiety;  who presented 11/15/2022 with dyspnea, acute hypoxic respiratory failure, COPD exacerbation.  Additional factors influencing patient status / progress : today, pt with a resting tremor throughout, L hand worst. Pt cooperative, motivated with OOB. Pt able to walk using FWW with supervision, usually uses his 4WW at home and spends most of his time in he recliner chair. Pt does not walk to dining room for meals, but does go on family outings with his son. Pt appears close to baseline except for endurance limited by respiratory issues. Pt reports living with PD for 25 years, has had extensive PT, OT and owns many tools to make life easier.  See details below.       Plan    Recommend Physical Therapy 3 times per week until therapy goals are met for the following treatments:  Bed Mobility, Gait Training, Neuro Re-Education / Balance, and Therapeutic Activities    DC Equipment Recommendations: None  Discharge Recommendations: Anticipate that the patient will have no further physical therapy needs after discharge from the hospital        Objective       11/17/22 1033   Charge Group   PT Evaluation PT Evaluation High   Total Time Spent   PT Total Time Yes   PT Evaluation Time Spent (Mins) 25   PT Total Time Spent (Calculated) 25   Initial Contact Note    Initial Contact Note Order Received and Verified, Physical Therapy Evaluation in Progress with Full Report to Follow.   Precautions   Precautions Fall Risk   Vitals   O2 (LPM) 1   O2 Delivery Device Silicone Nasal Cannula   Pain 0 - 10 Group   Therapist Pain Assessment 0;During Activity;Nurse Notified   Prior Living Situation   Prior Services  Intermittent Physical Support for ADL Per Service   Housing / Facility Assisted Living Residence  ([facility])   Steps Into Home 0   Steps In Home 0   Equipment Owned 4-Wheel Walker;Hospital Bed   Lives with - Patient's Self Care Capacity Other (Comments)   Comments staff assists with am routine   Prior Level of Functional Mobility   Bed Mobility Independent   Transfer Status Independent   Ambulation Independent   Distance Ambulation (Feet)   (mostly household, has meals delivered to room)   Assistive Devices Used 4-Wheel Walker   Comments Pt reports that son takes him to grandkids sports events where he does walk longer.   History of Falls   History of Falls No  (pt denies falls)   Cognition    Level of Consciousness Alert   Comments reports living with PD x 25 years, quite knowlegeable.   Active ROM Lower Body    Active ROM Lower Body  X   Comments functional for ambulation, but maitains flexed torso and knees in standing and walking.   Strength Lower Body   Lower Body Strength  WDL   Lower Body Muscle Tone   Comments resting tremor entire body, but worse with L hand.   Neurological Concerns   Comments baseline PD/tremors   Balance Assessment   Sitting Balance (Static) Fair   Sitting Balance (Dynamic) Fair   Standing Balance (Static) Fair -   Standing Balance (Dynamic) Fair -   Weight Shift Sitting Fair   Weight Shift Standing Fair   Comments with FWW   Gait Analysis   Gait Level Of Assist Supervised   Assistive Device 4 Wheel Walker   Distance (Feet) 40   # of Times Distance was Traveled 1   Deviation   (flexed posture, neck, unable to correct, fully aware.)   # of Stairs Climbed 0   Weight Bearing Status no restrictions   Bed Mobility    Supine to Sit Contact Guard Assist  (pt with hospital bed at home, likely easier with his own bed.)   Sit to Supine   (up chair)   Scooting Supervised   Rolling Supervised   Functional Mobility   Sit to Stand Supervised   Bed, Chair, Wheelchair Transfer Supervised   Toilet  Transfers Supervised   How much difficulty does the patient currently have...   Turning over in bed (including adjusting bedclothes, sheets and blankets)? 4   Sitting down on and standing up from a chair with arms (e.g., wheelchair, bedside commode, etc.) 4   Moving from lying on back to sitting on the side of the bed? 4   How much help from another person does the patient currently need...   Moving to and from a bed to a chair (including a wheelchair)? 4   Need to walk in a hospital room? 4   Climbing 3-5 steps with a railing? 3   6 clicks Mobility Score 23   Activity Tolerance   Sitting in Chair 15+   Short Term Goals    Short Term Goal # 1 Pt will ambulate x 125 feet using FWW with supervision in 6 visits to improve functional indep.   Education Group   Education Provided Role of Physical Therapist   Role of Physical Therapist Patient Response Patient;Acceptance;Explanation;Verbal Demonstration   Problem List    Problems Decreased Activity Tolerance   Anticipated Discharge Equipment and Recommendations   DC Equipment Recommendations None   Discharge Recommendations Anticipate that the patient will have no further physical therapy needs after discharge from the hospital   Interdisciplinary Plan of Care Collaboration   IDT Collaboration with  Nursing   Patient Position at End of Therapy Call Light within Reach;Tray Table within Reach;Seated;Phone within Reach   Collaboration Comments nsg updated   Session Information   Date / Session Number  11/17-1 (1/3, 11/23)

## 2022-11-17 NOTE — FACE TO FACE
"Face to Face Note  -  Durable Medical Equipment    Luis Alexander M.D. - NPI: [id number]  I certify that this patient is under my care and that they had a durable medical equipment(DME)face to face encounter by myself that meets the physician DME face-to-face encounter requirements with this patient on:    Date of encounter:   Patient:                    MRN:                       YOB: 2021  Mc Krishnamurthy  [id number]  1945     The encounter with the patient was in whole, or in part, for the following medical condition, which is the primary reason for durable medical equipment:  COPD    I certify that, based on my findings, the following durable medical equipment is medically necessary:    Oxygen   HOME O2 Saturation Measurements:(Values must be present for Home Oxygen orders)  Room air sat at rest: 80     With liters of O2: 1, O2 sat at rest with O2: 90  With Liters of O2: 90, O2 sat with amb with O2 : 2  Is the patient mobile?: Yes  If patient feels more short of breath, they can go up to 6 liters per minute and contact healthcare provider.    Supporting Symptoms: The patient requires supplemental oxygen, as the following interventions have been tried with limited or no improvement: \"Bronchodilators and/or steroid inhalers, \"Oral and/or IV steroids, \"Ambulation with oximetry, and \"Incentive spirometry.    My Clinical findings support the need for the above equipment due to:  Hypoxia  "

## 2022-11-17 NOTE — PROGRESS NOTES
Monitor Report    Sinus Rhythm & tachycardia / PVCs  Rate 80's-90's bpm  .17/.10/.39

## 2022-11-17 NOTE — CARE PLAN
Problem: Bronchoconstriction  Goal: Improve in air movement and diminished wheezing  Description: Target End Date:  2 to 3 days    1.  Implement inhaled treatments  2.  Evaluate and manage medication effects  Outcome: Progressing  Flowsheets  Taken 11/17/2022 1507 by Conrado Ray, RRT  Bronchodilator Goals/Outcome: Patient at Stable Baseline  Taken 11/15/2022 0400 by Gelacio Sherwood RRT  Bronchodilator Indications: History / Diagnosis       Respiratory Update    Treatment modality: svn/flutter  Frequency: qid    Pt tolerating current treatments well with no adverse reactions.

## 2022-11-18 ENCOUNTER — PHARMACY VISIT (OUTPATIENT)
Dept: PHARMACY | Facility: MEDICAL CENTER | Age: 77
End: 2022-11-18
Payer: COMMERCIAL

## 2022-11-18 VITALS
HEART RATE: 80 BPM | RESPIRATION RATE: 18 BRPM | WEIGHT: 234.79 LBS | DIASTOLIC BLOOD PRESSURE: 77 MMHG | BODY MASS INDEX: 33.61 KG/M2 | OXYGEN SATURATION: 91 % | HEIGHT: 70 IN | TEMPERATURE: 98.2 F | SYSTOLIC BLOOD PRESSURE: 132 MMHG

## 2022-11-18 LAB
GLUCOSE BLD STRIP.AUTO-MCNC: 113 MG/DL (ref 65–99)
GLUCOSE BLD STRIP.AUTO-MCNC: 192 MG/DL (ref 65–99)
GLUCOSE BLD STRIP.AUTO-MCNC: 309 MG/DL (ref 65–99)

## 2022-11-18 PROCEDURE — 82962 GLUCOSE BLOOD TEST: CPT | Mod: 91

## 2022-11-18 PROCEDURE — 99454 REM MNTR PHYSIOL PARAM 16-30: CPT

## 2022-11-18 PROCEDURE — A9270 NON-COVERED ITEM OR SERVICE: HCPCS | Performed by: STUDENT IN AN ORGANIZED HEALTH CARE EDUCATION/TRAINING PROGRAM

## 2022-11-18 PROCEDURE — 99239 HOSP IP/OBS DSCHRG MGMT >30: CPT | Performed by: INTERNAL MEDICINE

## 2022-11-18 PROCEDURE — 700102 HCHG RX REV CODE 250 W/ 637 OVERRIDE(OP): Performed by: STUDENT IN AN ORGANIZED HEALTH CARE EDUCATION/TRAINING PROGRAM

## 2022-11-18 PROCEDURE — 700102 HCHG RX REV CODE 250 W/ 637 OVERRIDE(OP): Performed by: INTERNAL MEDICINE

## 2022-11-18 PROCEDURE — RXMED WILLOW AMBULATORY MEDICATION CHARGE: Performed by: INTERNAL MEDICINE

## 2022-11-18 PROCEDURE — 99453 REM MNTR PHYSIOL PARAM SETUP: CPT

## 2022-11-18 PROCEDURE — 700111 HCHG RX REV CODE 636 W/ 250 OVERRIDE (IP): Performed by: INTERNAL MEDICINE

## 2022-11-18 RX ORDER — PREDNISONE 20 MG/1
40 TABLET ORAL DAILY
Qty: 2 TABLET | Refills: 0 | Status: SHIPPED | OUTPATIENT
Start: 2022-11-18 | End: 2022-11-19

## 2022-11-18 RX ADMIN — INSULIN LISPRO 9 UNITS: 100 INJECTION, SOLUTION INTRAVENOUS; SUBCUTANEOUS at 09:55

## 2022-11-18 RX ADMIN — PRAVASTATIN SODIUM 20 MG: 20 TABLET ORAL at 06:05

## 2022-11-18 RX ADMIN — ROPINIROLE HYDROCHLORIDE 0.5 MG: 0.5 TABLET, FILM COATED ORAL at 16:31

## 2022-11-18 RX ADMIN — NYSTATIN: 100000 POWDER TOPICAL at 06:00

## 2022-11-18 RX ADMIN — PREDNISONE 40 MG: 20 TABLET ORAL at 06:05

## 2022-11-18 RX ADMIN — OMEPRAZOLE 20 MG: 20 CAPSULE, DELAYED RELEASE ORAL at 06:05

## 2022-11-18 RX ADMIN — LOSARTAN POTASSIUM 100 MG: 50 TABLET, FILM COATED ORAL at 06:05

## 2022-11-18 RX ADMIN — DONEPEZIL HYDROCHLORIDE 10 MG: 5 TABLET, FILM COATED ORAL at 06:05

## 2022-11-18 RX ADMIN — INSULIN LISPRO 2 UNITS: 100 INJECTION, SOLUTION INTRAVENOUS; SUBCUTANEOUS at 09:54

## 2022-11-18 RX ADMIN — BUDESONIDE AND FORMOTEROL FUMARATE DIHYDRATE 2 PUFF: 160; 4.5 AEROSOL RESPIRATORY (INHALATION) at 08:37

## 2022-11-18 RX ADMIN — NYSTATIN: 100000 POWDER TOPICAL at 11:28

## 2022-11-18 RX ADMIN — ROPINIROLE HYDROCHLORIDE 0.5 MG: 0.5 TABLET, FILM COATED ORAL at 00:37

## 2022-11-18 RX ADMIN — TAMSULOSIN HYDROCHLORIDE 0.4 MG: 0.4 CAPSULE ORAL at 08:37

## 2022-11-18 RX ADMIN — NYSTATIN: 100000 POWDER TOPICAL at 12:20

## 2022-11-18 RX ADMIN — LEVODOPA AND CARBIDOPA 3 CAPSULE: 195; 48.75 CAPSULE, EXTENDED RELEASE ORAL at 10:00

## 2022-11-18 RX ADMIN — FLUOXETINE 10 MG: 10 CAPSULE ORAL at 08:46

## 2022-11-18 RX ADMIN — INSULIN LISPRO 9 UNITS: 100 INJECTION, SOLUTION INTRAVENOUS; SUBCUTANEOUS at 12:15

## 2022-11-18 RX ADMIN — INSULIN LISPRO 6 UNITS: 100 INJECTION, SOLUTION INTRAVENOUS; SUBCUTANEOUS at 12:16

## 2022-11-18 RX ADMIN — LEVODOPA AND CARBIDOPA 3 CAPSULE: 195; 48.75 CAPSULE, EXTENDED RELEASE ORAL at 06:00

## 2022-11-18 RX ADMIN — ROPINIROLE HYDROCHLORIDE 0.5 MG: 0.5 TABLET, FILM COATED ORAL at 08:37

## 2022-11-18 ASSESSMENT — FIBROSIS 4 INDEX: FIB4 SCORE: 1.29

## 2022-11-18 NOTE — DISCHARGE PLANNING
Follow up for post acute services Therapy note reviewed. Limited therapy ilya for IRF level of care. Anticipate home with OP support versus skilled nursing maximino medically cleared. No physiatry consult ordered per protocol. Please consider an Outpatient Physiatry consult follow up with Dr. Kristen Savage.

## 2022-11-18 NOTE — CARE PLAN
The patient is Stable - Low risk of patient condition declining or worsening    Shift Goals  Clinical Goals: maintain skin integrity  Patient Goals: rest  Family Goals: keily    Progress made toward(s) clinical / shift goals:  yes    Patient is not progressing towards the following goals:      Problem: Knowledge Deficit - Standard  Goal: Patient and family/care givers will demonstrate understanding of plan of care, disease process/condition, diagnostic tests and medications  Outcome: Met     Problem: Knowledge Deficit - COPD  Goal: Patient/significant other demonstrates understanding of disease process, utilization of the Action Plan, medications and discharge instruction  Outcome: Met     Problem: Risk for Infection - COPD  Goal: Patient will remain free from signs and symptoms of infection  Outcome: Met     Problem: Nutrition - Advanced  Goal: Patient will display progressive weight gain toward goal have adequate food and fluid intake  Outcome: Met     Problem: Ineffective Airway Clearance  Goal: Patient will maintain patent airway with clear/clearing breath sounds  Outcome: Met     Problem: Impaired Gas Exchange  Goal: Patient will demonstrate improved ventilation and adequate oxygenation and participate in treatment regimen within the level of ability/situation.  Outcome: Met     Problem: Risk for Aspiration  Goal: Patient's risk for aspiration will be absent or decrease  Outcome: Met     Problem: Self Care  Goal: Patient will have the ability to perform ADLs independently or with assistance (bathe, groom, dress, toilet and feed)  Outcome: Met     Problem: Skin Integrity  Goal: Skin integrity is maintained or improved  Outcome: Met     Problem: Fall Risk  Goal: Patient will remain free from falls  Outcome: Met

## 2022-11-18 NOTE — WOUND TEAM
[facility]  Inpatient Services  Wound and Skin Care Brief Evaluation    Admission Date: 11/15/2022     Last order of IP CONSULT TO WOUND CARE was found on 11/17/2022 from Hospital Encounter on 11/15/2022     HPI, PMH, SH: Reviewed    Chief Complaint   Patient presents with    Shortness of Breath    Cough     PT reports SOB getting worse over past 4 days and increasing cough.  PT BIB [facility]      Diagnosis: COPD exacerbation (HCC) [J44.1]    Unit where seen by Wound Team: T728/02     Wound consult placed regarding BL groin. Chart and images reviewed. This discussed with bedside RN. This RN in to assess patient. Pt pleasant and agreeable. Bilateral groin with MASD and yeast-like appearance and small pinpoint open wounds with depth of <0.2cm. Nystatin powder, Aquacel Ag, and Interdry placed. No pressure injuries or advanced wound care needs identified. Wound consult completed. Wound team signing off, re-consult if patient has further advanced wound care needs or if current wounds deteriorate.          RSKIN:   CURRENTLY IN PLACE (X), APPLIED THIS VISIT (A), ORDERED (O):   Q shift Fermin:  X  Q shift pressure point assessments:  X    Surface/Positioning   Pressure redistribution mattress            Low Airloss        X  Bariatric foam      Bariatric AWAIS     Waffle cushion        Waffle Overlay          Reposition q 2 hours      TAPs Turning system     Z Bryce Pillow     Offloading/Redistribution NA  Sacral Mepilex (Silicone dressing)     Heel Mepilex (Silicone dressing)         Heel float boots (Prevalon boot)             Float Heels off Bed with Pillows           Respiratory   Silicone O2 tubing       X  Gray Foam Ear protectors   X  Cannula fixation Device (Tender )          High flow offloading Clip    Elastic head band offloading device      Anchorfast                                                         Trach with Optifoam split foam             Containment/Moisture Prevention     Rectal tube or  BMS    Purwick/Condom Cath        Blair Catheter    Barrier wipes           Barrier paste     X  Antifungal tx    A  Interdry    A    Mobilization     Ambulated back to bed with SB assist with a FWW  Up to chair      X  Ambulate      PT/OT      Nutrition       Dietician        Diabetes Education      PO  X   TF     TPN     NPO   # days

## 2022-11-18 NOTE — DISCHARGE PLANNING
Received Choice form at 4505 on 11/17  Agency/Facility Name: [facility]   Referral sent per Choice form @ [phone]  [facility] has manually faxed MAR, Glucose test results, DME & HH orders and DC summary to [facility] @[phone].

## 2022-11-18 NOTE — PROGRESS NOTES
Report received from day shift RN, assumed care of pt. Pt A&Ox4. Plan of care discussed with pt, labs and chart reviewed. All needs met at this time. Tele box on. On 1L O2 via nasal canula. Call light within reach, bed locked and in lowest position. All fall precautions and hourly rounding in place.

## 2022-11-18 NOTE — DISCHARGE SUMMARY
Discharge Summary    CHIEF COMPLAINT ON ADMISSION  Chief Complaint   Patient presents with    Shortness of Breath    Cough     PT reports SOB getting worse over past 4 days and increasing cough.  PT BIB [facility]        Reason for Admission  EMS     Admission Date  11/15/2022    CODE STATUS  Full Code    HPI & HOSPITAL COURSE  This is a 76 y.o. male with PMHX of HTN, HLD< COPD IDDM2 who was admitted on 11/15/22 after presenting to ER complaining of SOB   For the past 4 days, has been having shortness of breath, productive cough of yellow/green sputum, and general feeling sick. He has not tried any medications to control these symptoms, but overall presented as he was feeling worse from earlier this week  Patient was admitted for COPD exacerbation he was started on inhalers and also prednisone and he is feeling better, oxygen requirements  have improved and since he still requiring some oxygen home O2 has been arranged. Will be discharged on prednisone for one more day   Patient now doing better. He was seen by physical therapy and is at baseline.  Patient will be discharged to his assisted living today     The patient met 2-midnight criteria for an inpatient stay at the time of discharge.    Discharge Date  11/18/22    FOLLOW UP ITEMS POST DISCHARGE  PCP    DISCHARGE DIAGNOSES  Principal Problem:    Acute respiratory failure with hypoxia (HCC) POA: Unknown  Active Problems:    Parkinson's disease (HCC) POA: Yes    Type 2 diabetes mellitus without complications (HCC) POA: Yes    Urinary incontinence POA: Yes    Hypertension POA: Yes    GERD (gastroesophageal reflux disease) POA: Yes    COPD exacerbation (HCC) POA: Yes    Parainfluenza infection POA: Unknown  Resolved Problems:    * No resolved hospital problems. *      FOLLOW UP  No future appointments.  [facility]  [address] [facility] [id number] [phone]          MEDICATIONS ON DISCHARGE     Medication List        START taking  these medications        Instructions   predniSONE 20 MG Tabs  Commonly known as: DELTASONE   Take 2 Tablets by mouth every day for 1 day.  Dose: 40 mg            CHANGE how you take these medications        Instructions   Antacid Ultra Strength 1000 MG Chew  What changed: See the new instructions.  Generic drug: Calcium Carbonate Antacid   TAKE 2 TABLETS (2000MG) BY MOUTH THREE TIMES A DAY  AS NEEDED FOR DYSPEPSIA     cetirizine 10 MG Tabs  What changed: See the new instructions.  Commonly known as: ZYRTEC   TAKE 1 TABLET BY MOUTH ONCE DAILY FOR SEASONAL ALLERGIES     omeprazole 20 MG delayed-release capsule  What changed: See the new instructions.  Commonly known as: PRILOSEC   TAKE 1 TABLET BY MOUTH ONE TIME A DAY FOR GERD/DYSPESIA            CONTINUE taking these medications        Instructions   acetaminophen 500 MG Tabs  Commonly known as: Acetaminophen Extra Strength   Take 2 Tablets by mouth every 8 hours as needed for Mild Pain, Moderate Pain or Fever.  Dose: 1,000 mg     albuterol 108 (90 Base) MCG/ACT Aers inhalation aerosol   Inhale 2 Puffs every four hours as needed for Shortness of Breath.  Dose: 2 Puff     BD Sharps  XL Misc   1 Container every 30 days.  Dose: 1 Container     * carbidopa-levodopa SR  MG per tablet  Commonly known as: SINEMET CR   Take 1 Tablet by mouth at bedtime.  Dose: 1 Tablet     * Rytary 48. MG Cpcr  Generic drug: Carbidopa-Levodopa ER   Take 3 Capsules by mouth 4 times a day. Administer 6am, 10am, 2pm, and 6pm  Dose: 3 Capsule     carboxymethylcellulose 0.5 % Soln  Commonly known as: Refresh Tears   Administer 1 Drop into both eyes in the morning, at noon, and at bedtime.  Dose: 1 Drop     donepezil 10 MG tablet  Commonly known as: ARICEPT   TAKE 1 TABLET BY MOUTH TWICE DAILY     FLUoxetine 10 MG Caps  Commonly known as: PROZAC   Take 1 Capsule by mouth 2 times a day.  Dose: 10 mg     GLUCERNA ADVANCE SHAKE PO   Take 1 Can by mouth 1 time a day as needed  (FOR MISSED MEALS OR SNACKS).  Dose: 1 Can     glucose 4 GM chewable tablet   Chew 1 Tablet as needed for Low Blood Sugar.  Dose: 4 g     ketoconazole 2 % shampoo  Commonly known as: NIZORAL   Apply  topically every 48 hours. APPLY TO SCALP, FACE AND GROIN, LET SIT FOR 3 TO 5 MINUTES THEN RINSE. ONCE IMPROVED, CONTINUE ONCE WEEKLY AS MAINTENANCE     lactobacillus rhamnosus Caps capsule   Take 1 Capsule by mouth every morning with breakfast.  Dose: 1 Capsule     LEVEMIR FLEXPEN SC   Inject 60 Units under the skin every day. HOLD IF FSBS IS LESS THAN 150  Dose: 60 Units     losartan 100 MG Tabs  Commonly known as: COZAAR   TAKE 1 TABLET BY MOUTH ONCE DAILY     magnesium hydroxide 400 MG/5ML Susp  Commonly known as: MILK OF MAGNESIA   Take 30 mL by mouth every 12 hours as needed.  Dose: 30 mL     melatonin 3 MG Tabs   TAKE 1 TABLET BY MOUTH AT BEDTIME     metFORMIN 500 MG Tabs  Commonly known as: GLUCOPHAGE   Take 2 Tablets by mouth every day.  Dose: 1,000 mg     MIRALAX PO   Take 17 g by mouth 1 time a day as needed.  Dose: 17 g     Multivitamin Tabs   TAKE 1 TABLET BY MOUTH ONCE DAILY     NOVOLOG FLEXPEN SC   Inject 20 Units under the skin 2 times a day. Administer before breakfast and before dinner  Dose: 20 Units     pravastatin 20 MG Tabs  Commonly known as: PRAVACHOL   Take 1 Tablet by mouth every day.  Dose: 20 mg     ROPINIRole 0.25 MG Tabs  Commonly known as: REQUIP   Take 2 Tablets by mouth 3 times a day.  Dose: 0.5 mg     simethicone 125 MG chewable tablet  Commonly known as: Mylicon   Chew 2 Tablets every 12 hours as needed for Flatulence.  Dose: 250 mg     tamsulosin 0.4 MG capsule  Commonly known as: FLOMAX   TAKE 1 CAPSULE BY MOUTH EVERY MORNING 30 MINUTES AFTER BREAKFAST     triamcinolone acetonide 0.1 % Crea  Commonly known as: KENALOG   Apply 1 Application topically every 12 hours as needed (FOR RASH ON NECK AND BUTTOCKS).  Dose: 1 Application     UltiCare Micro Pen Needles  Generic drug: Insulin Pen  Needle 32 G x 4 mm   USE AS DIRECTED     vitamin D3 125 MCG (5000 UT) Caps   TAKE 1 CAPSULE BY MOUTH ONCE DAILY           * This list has 2 medication(s) that are the same as other medications prescribed for you. Read the directions carefully, and ask your doctor or other care provider to review them with you.                  Allergies  No Known Allergies    DIET  Orders Placed This Encounter   Procedures    Diet Order Diet: Consistent CHO (Diabetic)     Standing Status:   Standing     Number of Occurrences:   1     Order Specific Question:   Diet:     Answer:   Consistent CHO (Diabetic) [4]       ACTIVITY  As tolerated.  Weight bearing as tolerated    CONSULTATIONS  None     PROCEDURES  None     LABORATORY  Lab Results   Component Value Date    SODIUM 138 11/17/2022    POTASSIUM 4.5 11/17/2022    CHLORIDE 100 11/17/2022    CO2 28 11/17/2022    GLUCOSE 195 (H) 11/17/2022    BUN 29 (H) 11/17/2022    CREATININE 1.06 11/17/2022        Lab Results   Component Value Date    WBC 11.7 (H) 11/17/2022    HEMOGLOBIN 14.5 11/17/2022    HEMATOCRIT 42.6 11/17/2022    PLATELETCT 194 11/17/2022        Total time of the discharge process exceeds 41 minutes.

## 2022-11-18 NOTE — CARE PLAN
Problem: Knowledge Deficit - COPD  Goal: Patient/significant other demonstrates understanding of disease process, utilization of the Action Plan, medications and discharge instruction  Outcome: Progressing     Problem: Self Care  Goal: Patient will have the ability to perform ADLs independently or with assistance (bathe, groom, dress, toilet and feed)  Outcome: Progressing     Problem: Skin Integrity  Goal: Skin integrity is maintained or improved  Outcome: Progressing     Problem: Fall Risk  Goal: Patient will remain free from falls  Outcome: Progressing   The patient is Stable - Low risk of patient condition declining or worsening    Shift Goals  Clinical Goals: maintain skin integrity, monitor VS  Patient Goals: rest  Family Goals: keily    Progress made toward(s) clinical / shift goals:  Patient's skin was assessed and cleaned. New wound found and wound consult placed. New dressing applied.

## 2022-11-18 NOTE — THERAPY
"Occupational Therapy   Initial Evaluation     Patient Name: Mc Krishnamurthy  Age:  76 y.o., Sex:  male  Medical Record #: [id number]  Today's Date: 11/17/2022    Precautions: Fall Risk  Comments: hx Parkinson's disease    Assessment  Patient is 76 y.o. male admitted with shortness of breath, COPD exacerbation. Pmhx includes HTN, IDDM2, and Parkinson's disease x25yrs. Pt demonstrates ADL independence at/near his baseline, reports [facility] staff asssit with ADLs as needed and he can call them at any time for assistance. Pt has supportive family and a long history with living with PD, reports owning a lot of adaptive equipment to increase his independence.     Plan    Recommend Occupational Therapy 3 times per week until therapy goals are met for the following treatments:  Adaptive Equipment, Self Care/Activities of Daily Living, Therapeutic Activities, and Therapeutic Exercises.    DC Equipment Recommendations: None (has all DME)  Discharge Recommendations: Anticipate that the patient will have no further occupational therapy needs after discharge from the hospital     Subjective    \"I have all sorts of gadgets to help my shakes.\" (Referring to his adaptive equipment at home)      Objective     11/17/22 1025   Prior Living Situation   Prior Services Housekeeping / Homemaker Services;Intermittent Physical Support for ADL Per Service   Housing / Facility Assisted Living Residence   Steps Into Home 0   Steps In Home 0   Bathroom Set up Walk In Shower;Shower Chair;Grab Bars   Equipment Owned Front-Wheel Walker;4-Wheel Walker;Tub / Shower Seat;Grab Bar(s) In Tub / Shower;Grab Bar(s) By Toilet;Raised Toilet Seat With Arms;Hospital Bed;Hand Held Shower;Sock Aid;Reacher   Lives with - Patient's Self Care Capacity Other (Comments)  ([facility] staff)   Comments pt lives at [facility] where he receives assist as needed from staff, can call for assist anytime   Prior Level of ADL Function   Self Feeding Independent   Grooming / Hygiene " Independent   Bathing Requires Assist   Dressing Requires Assist   Toileting Requires Assist   Comments staff assists PRN   Precautions   Precautions Fall Risk   Comments hx Parkinson's disease   Pain 0 - 10 Group   Therapist Pain Assessment Nurse Notified;Post Activity Pain Same as Prior to Activity;0   Cognition    Level of Consciousness Alert   Comments good insight to PD deficits and adaptive strategies   Strength Upper Body   Upper Body Strength  WDL   Coordination Upper Body   Coordination X   Comments tremulous at baseline d/t PD [id number]   Balance Assessment   Sitting Balance (Static) Fair   Sitting Balance (Dynamic) Fair   Standing Balance (Static) Fair -   Standing Balance (Dynamic) Fair -   Weight Shift Sitting Fair   Weight Shift Standing Fair   Comments w/FWW   Bed Mobility    Supine to Sit Contact Guard Assist   Scooting Supervised   Rolling Supervised   ADL Assessment   Eating Supervision  (set-up assist)   Grooming Supervision;Seated  (reports grooms standing at baseline)   Upper Body Dressing Minimal Assist   Lower Body Dressing Moderate Assist   Toileting Moderate Assist   Comments pt report assist required at baseline for bathing, dressing and toieting, staff at [facility] provides needed assist   How much help from another person does the patient currently need...   Putting on and taking off regular lower body clothing? 2   Bathing (including washing, rinsing, and drying)? 2   Toileting, which includes using a toilet, bedpan, or urinal? 3   Putting on and taking off regular upper body clothing? 3   Taking care of personal grooming such as brushing teeth? 3   Eating meals? 3   6 Clicks Daily Activity Score 16   Functional Mobility   Sit to Stand Supervised   Bed, Chair, Wheelchair Transfer Supervised   Toilet Transfers Contact Guard Assist   Transfer Method Stand Step   Mobility w/FWW   Activity Tolerance   Sitting in Chair 10+min   Sitting Edge of Bed 5min   Standing 4min x2   Patient / Family  Goals   Patient / Family Goal #1 home ASAP   Short Term Goals   Short Term Goal # 1 pt will complete toileting ADL with Dinora   Short Term Goal # 2 pt will complete toilet transfer with SPV   Short Term Goal # 3 pt will tolerate >5min standing grooming at sink with SPV   Education Group   Education Provided Role of Occupational Therapist;Activities of Daily Living;Transfers   Role of Occupational Therapist Patient Response Patient;Acceptance;Explanation;Verbal Demonstration   Transfers Patient Response Patient;Acceptance;Explanation;Verbal Demonstration   ADL Patient Response Patient;Acceptance;Explanation;Verbal Demonstration   Problem List   Problem List Decreased Active Daily Living Skills;Decreased Homemaking Skills;Decreased Functional Mobility;Decreased Activity Tolerance   Anticipated Discharge Equipment and Recommendations   DC Equipment Recommendations None  (has all DME)   Discharge Recommendations Anticipate that the patient will have no further occupational therapy needs after discharge from the hospital

## 2022-11-18 NOTE — RESPIRATORY CARE
REMOTE MONITORING PROGRAM by RESPIRATORY THERAPY  2022 at 2:58 PM by Shanel Olivo, RRT             Patient's name:  Mc Krishnamurthy        MRN: [id number]       : 1945  Physical address: [address] [place] [id number]  Cell phone # [phone]  Current oxygen requirement 2 L  Ref # [id number] & Lot # [id number]  Emergency contact name & number: Derrell Krishnamurthy [phone]  Primary language English  COPD         Patient agrees to Remote Monitoring program. Device instruction performed with welcome packet, consent signed and placed at bedside chart. Patient instructed on how to use MyChart and Zoom. No further questions at this time.

## 2022-11-19 ENCOUNTER — TELEPHONE (OUTPATIENT)
Dept: OTHER | Facility: MEDICAL CENTER | Age: 77
End: 2022-11-19
Payer: COMMERCIAL

## 2022-11-19 VITALS — HEART RATE: 77 BPM | OXYGEN SATURATION: 85 % | RESPIRATION RATE: 19 BRPM

## 2022-11-19 VITALS — HEART RATE: 80 BPM | OXYGEN SATURATION: 93 %

## 2022-11-19 VITALS — HEART RATE: 94 BPM | OXYGEN SATURATION: 95 %

## 2022-11-19 NOTE — TELEPHONE ENCOUNTER
RPM Notification: Patient Communication  Actions: None    Total time (minutes) spent communicating with patient: 3        Patient called about a few questions about his tanks and concentrator. Patient questioned which to use. Advised to use tanks when out of the house and the concentrator when at home. Patient stated he was unable to find the connector for his cannula tubing. We advised picking up more tubing at the ER  area for more.         Spoke with ko again for welcome call. Patient was advised to get more tubing and cannula connections at ER .

## 2022-11-19 NOTE — PROGRESS NOTES
Discharge instructions given to patient at bedside, verbalizes understanding and states plans for follow-up with PCP and discharge information provided on Heart Failure, low sodium/fluid restricted diet, new and home medication review, post-discharge activity level, and worsening of symptoms needing follow-up care. Telemetry monitor/IV cathlon removed. All belongings accounted for, all questions answered at this time. Patient discharged to assisted living with son.

## 2022-11-19 NOTE — TELEPHONE ENCOUNTER
RPM Notification: Welcome Call  Actions: None    Total time (minutes) spent communicating with patient: 4      Gave patient welcome call. Patient has oxygen and concentrator delivered.  Patient expressed understanding to program protocols and monitoring. Advised to call or text at any time that he plans so disconnect or take off oxygen.  He expressed one question about the concentrator and the tubing connectors. He stated he lost the connector for the tubing. He stated that the concentrator is very loud. On the phone, the concentrator was very loud.  We were concerned that there was possibility that the equipment may not be set up correctly due to the excessive noise.     We attempted to call his o2 company, [employer]. However, the office is closed on weekends.   Spoke to charge nurse, Onelia, for next steps. It was recommended to contact the emergency contact, Derrell, to see if now connectors can be picked up today to help decrease noise with the concentrator in attempt to see if the noise is from being so close to the device.     Emergency contact, Derrell was called and texted at 15:54. Unable to leave message de to no voicemail set up. Attempted to call to get Emergency Contact to  supplies before 17:00 for patient.       Patient's stats have charlie stable and within range since shift start. Will continue to attempt to contact emergency contact to have them  more supplies tomorrow for patient. Will contact [employer] on emergency line if stats go out of range for patient and it is determined that the concentrator may be the issue.

## 2022-11-20 ENCOUNTER — TELEPHONE (OUTPATIENT)
Dept: OTHER | Facility: MEDICAL CENTER | Age: 77
End: 2022-11-20
Payer: COMMERCIAL

## 2022-11-20 NOTE — TELEPHONE ENCOUNTER
RPM Notification: Patient Communication  Actions: None    Total time (minutes) spent communicating with patient: 3        Patient's son Derrell called back. Spoke with Derrell about the concerns with the concentrator. Derrell expressed that he was also concerned with how loud the equipment was. He stated it sounds as loud as an air compressor. I advised Derrell about the tubing connector was lost per patient's comments and Mc is very close to the concentrator and the noise. I advised Derrell to  new tubing connectors tomorrow at the [facility], but to contact us before he does so we can get the supplies ready with RT.   Derrell was also informed that we attempted to contact [employer] to check out the equipment, but were unable to speak with anyone due to the weekend hours. Derrell was advised that  RPM will follow up on Monday to see about the equipment.   Derrell expressed no further questions.

## 2022-11-20 NOTE — TELEPHONE ENCOUNTER
RPM Notification: Disconnect and Patient Communication  Actions: None    Total time (minutes) spent communicating with patient: 44    Called pt due to disconnection, pt is unsure of how to work his phone. Jemma called brianda for support pt stated brianda was unable to get him reconnected. Call EC to see if he could come help, no answer. Charge asked pul. If we can do Q4 pul,approved request. Did first Q4 at 950pm

## 2022-11-20 NOTE — TELEPHONE ENCOUNTER
RPM Notification: Disconnect and Patient Communication  Actions: None    Total time (minutes) spent communicating with patient: 3    Pt disconnected. Pt stated the tape isnt sticking to his finger good and needs to replace it. I asked him if he wants to redo it really quick, he stated no he wants to make his sandwich first and to call back in 45 mins

## 2022-11-20 NOTE — TELEPHONE ENCOUNTER
RPM Notification: High Alert and Patient Communication  Actions: None    Total time (minutes) spent communicating with patient: 2    Pt alerted at 73% o2. Pt informed me that he was switching out hoses and he was a little tangled up.

## 2022-11-20 NOTE — TELEPHONE ENCOUNTER
RPM Notification: Disconnect  Actions: None    Total time (minutes) spent communicating with patient: 5    Called Mc to do a Q4 hour check in as he is disconnected still. Patient said he was doing fine with no problems and that he is wearing 2 liters of oxygen. I did ask him if someone would be able to get him reconnected today he said yes.  I told him if they need any help to please give us a call and we would be happy to help reconnect him. If I don't see him back up on the monitor by 12:00 I will call him back and check in.

## 2022-11-20 NOTE — TELEPHONE ENCOUNTER
RPM Notification: Reconnect  Actions: None    Total time (minutes) spent communicating with patient: 5      Mc's son called to troubleshoot to get him reconnected. Right as he called Mc reconnected to the monitor. Will continue to monitor and call him if he alerts or disconnects.

## 2022-11-21 ENCOUNTER — TELEPHONE (OUTPATIENT)
Dept: OTHER | Facility: MEDICAL CENTER | Age: 77
End: 2022-11-21
Payer: COMMERCIAL

## 2022-11-21 VITALS — OXYGEN SATURATION: 95 % | HEART RATE: 89 BPM

## 2022-11-21 VITALS — OXYGEN SATURATION: 94 % | HEART RATE: 85 BPM

## 2022-11-21 VITALS — HEART RATE: 71 BPM | RESPIRATION RATE: 23 BRPM | OXYGEN SATURATION: 91 %

## 2022-11-21 VITALS — RESPIRATION RATE: 22 BRPM | OXYGEN SATURATION: 91 % | HEART RATE: 79 BPM

## 2022-11-21 NOTE — TELEPHONE ENCOUNTER
RPM Notification: Disconnect  Actions: None    Total time (minutes) spent communicating with patient: 2    Contacted pt because though there was no alert his SpO2 was 0% . He stated he was doing well and removed his wrist band to take a shower. Pt stated he would reconnect when done.

## 2022-11-21 NOTE — TELEPHONE ENCOUNTER
RPM Notification: High Alert  Actions: None    Total time (minutes) spent communicating with patient: 2    At  0718 low SPo2 alert of 84%. Contacted patient. Patient states he is doing well he was just blowing his nose and now he put it back on.  Pt is on 2L of oxygen  Patient SPo2 increased to 91%.

## 2022-11-21 NOTE — TELEPHONE ENCOUNTER
RPM Notification: Disconnect  Actions: None    Total time (minutes) spent communicating with patient: 5    Patient called at  1056 to let us know the battery was red. I had him remove the sensor and wipe it down and then put it back in and he said light was blue after that. His stats came back at 1058 and his SpO2 of 94%. I let him know if it flashes red again that next time we will just change the battery.

## 2022-11-24 ENCOUNTER — TELEPHONE (OUTPATIENT)
Dept: OTHER | Facility: MEDICAL CENTER | Age: 77
End: 2022-11-24
Payer: COMMERCIAL

## 2022-11-24 VITALS — HEART RATE: 80 BPM | OXYGEN SATURATION: 92 %

## 2022-11-24 VITALS — OXYGEN SATURATION: 93 % | HEART RATE: 82 BPM | RESPIRATION RATE: 16 BRPM

## 2022-11-24 NOTE — TELEPHONE ENCOUNTER
RPM Notification: Disconnect  Actions: None    Total time (minutes) spent communicating with patient: 6      Pt became disconnected at  0833 . Contacted patient and patient reported he was doing well but his light was flashing green. I walked pt through to reconnect to bluetooth. Pt stated that when he pressed settings clicked on bluetooth and it showed that sureshimo was connected. His light was still flashing green. I had him disconnect bluetooth and reconnect it. Pt did and it still did not work for him. I then asked for pt to turn off the phone and turn it back on  Patient reconnected at 0844 with SpO2 of 92%. While on the phone patient stated to hold on his light was blinking red. I then instructed patient on changing battery pt understood and connected.

## 2022-11-24 NOTE — TELEPHONE ENCOUNTER
RPM Notification: Reconnect  Actions: None    Total time (minutes) spent communicating with patient: 1    Patient called to let me know phone was charged and he reconnected. Pt reconnected at 1030 w/ spO2 of 93%.

## 2022-11-24 NOTE — TELEPHONE ENCOUNTER
RPM Notification: Disconnect  Actions: None    Total time (minutes) spent communicating with patient: 2    Pt became disconnected at  1001 . Contacted patient and patient reported he was doing well his phone  and he just noticed. He stated he just put it to charge and to give it some time while it charges for him to reconnect. I told him I would give him about 30 min and I did not see him back on I would call him back. Pt agreed.

## 2022-11-27 ENCOUNTER — TELEPHONE (OUTPATIENT)
Dept: OTHER | Facility: MEDICAL CENTER | Age: 77
End: 2022-11-27
Payer: COMMERCIAL

## 2022-11-28 ENCOUNTER — TELEPHONE (OUTPATIENT)
Dept: OTHER | Facility: MEDICAL CENTER | Age: 77
End: 2022-11-28

## 2022-11-28 NOTE — TELEPHONE ENCOUNTER
RPM Notification: Disconnect  Actions: None    Total time (minutes) spent communicating with patient: 5      Mc called to let me know he was disconnected and had a new arm band to put on. He has someone coming to to the house to help him get it back on. He will give us a call when he is reconnecting.

## 2022-11-28 NOTE — TELEPHONE ENCOUNTER
RPM Notification: Reconnect  Actions: None    Total time (minutes) spent communicating with patient: 5    Patient is now reconnected to monitor. Will continue to monitor and call him back if he alerts or disconnects.

## 2022-11-28 NOTE — TELEPHONE ENCOUNTER
RPM Notification: Disconnect  Actions: None    Total time (minutes) spent communicating with patient: 1    Pt had disconnected at 2116 to take a shower. Called back at 2144 that he is done and is reconnecting. Vitals are stable will continue to monitor.

## 2022-11-30 ENCOUNTER — TELEPHONE (OUTPATIENT)
Dept: OTHER | Facility: MEDICAL CENTER | Age: 77
End: 2022-11-30
Payer: COMMERCIAL

## 2022-11-30 VITALS — HEART RATE: 91 BPM | OXYGEN SATURATION: 91 % | RESPIRATION RATE: 24 BRPM

## 2022-12-01 ENCOUNTER — TELEPHONE (OUTPATIENT)
Dept: OTHER | Facility: MEDICAL CENTER | Age: 77
End: 2022-12-01
Payer: COMMERCIAL

## 2022-12-01 VITALS — RESPIRATION RATE: 23 BRPM | HEART RATE: 82 BPM | OXYGEN SATURATION: 94 %

## 2022-12-01 NOTE — TELEPHONE ENCOUNTER
RPM Notification: Patient Communication  Actions: None    Total time (minutes) spent communicating with patient: 0    Charge contacted [facility] to consult on the patient. They recommended the same process that they work with in order to prioritize care of the patient. Reaffirming that we will contact the patient at 0600.

## 2022-12-01 NOTE — TELEPHONE ENCOUNTER
RPM Notification: Patient Communication  Actions: None    Total time (minutes) spent communicating with patient: 2    At 1758 Pt called to inform us that he was going to need more batteries for the monitor. He stated he wanted to send his son this evening to get some. I stated to him that we don't have RT available  supplies in the evening. I stated that supplies could be picked up Monday-Sunday from 2380-2148. Pt stated he is currently using his last wristband. I asked if he or his son could pick some tomorrow. He said he will talk to him and will let us know. I informed the NOC shift about it to communicate with the day  for tomorrow that pt is needing supplies.

## 2022-12-01 NOTE — TELEPHONE ENCOUNTER
RPM Notification: Patient Communication  Actions: None    Total time (minutes) spent communicating with patient: 2      Called patient at 1006 to do his Q4 check. He let me know he was doing okay and said that someone was on the way to  his monitors for him.

## 2022-12-01 NOTE — TELEPHONE ENCOUNTER
RPM Notification: Disconnect  Actions: None    Total time (minutes) spent communicating with patient: 2    Patient has no more batteries and has disconnected. I have notified patient that we will be checking on him every 4 hours until he can get a new set of batteries. Patient has not alerted once in at least 8 hours.

## 2022-12-02 NOTE — TELEPHONE ENCOUNTER
RPM Notification: Reconnect  Actions: Contacted Damian    Total time (minutes) spent communicating with patient: 20    Called patient in order to reconnect. After troubleshooting through all possible solutions I contacted damian to assist the patient further where I could not. Damian was eventually able to get the patient reconnected and their SpO2 was doing well.

## 2022-12-02 NOTE — TELEPHONE ENCOUNTER
RPM Notification: Patient Communication  Actions: Contacted Patient    Total time (minutes) spent communicating with patient: 2    Contacted pt at 1655 to do a check up since he still is disconnected. The person who picked them up has not returned with them. Pt stated that he has been doing okay. I told him to give us a call if he needs help getting reconnected.

## 2022-12-03 ENCOUNTER — TELEPHONE (OUTPATIENT)
Dept: OTHER | Facility: MEDICAL CENTER | Age: 77
End: 2022-12-03
Payer: COMMERCIAL

## 2022-12-03 NOTE — TELEPHONE ENCOUNTER
RPM Notification: Disconnect  Actions: Device Troubleshoot    Total time (minutes) spent communicating with patient: 18    Pt disconnected at 0745. I called the pt to say he was disconnected, I asked if he had the mary anne open and he said yes, I asked if the light on his wristband was solid blue and he said yes.    We attempted to turn his wifi on and off, his bluetooth on and off, manually checking if he was connected to his [employer] device, and he said it told him he couldn't connect. I advised the pt to turn his phone off and back on, and then to open up the [employer] mary anne as well as his settings mar yanne, and then to call me back when he completed those tasks and we would try reconnecting him together again.    Pt reconnected at 0806 with SpO2 92% and called me back at 0807 to say it looks like he was reconnected.

## 2022-12-04 ENCOUNTER — TELEPHONE (OUTPATIENT)
Dept: OTHER | Facility: MEDICAL CENTER | Age: 77
End: 2022-12-04
Payer: COMMERCIAL

## 2022-12-04 VITALS — RESPIRATION RATE: 16 BRPM | HEART RATE: 86 BPM | OXYGEN SATURATION: 96 %

## 2022-12-04 VITALS — RESPIRATION RATE: 24 BRPM | OXYGEN SATURATION: 98 % | HEART RATE: 78 BPM

## 2022-12-04 NOTE — TELEPHONE ENCOUNTER
RPM Notification: Disconnect  Actions: Contacted Patient    Total time (minutes) spent communicating with patient: 1  Pt disconnected from monitor at 1238. Contacted pt via phone call and got no answer. Sent pt a text saying they disconnected and asked if they were okay, no response. Waited five minutes from first phone call and at 1243, called pt's phone again, and no answer. Proceeded to call the pt's emergency contact, Derrell his son that is listed as emergency contact. Derrell answered the phone and I stated that pt was disconnected from their monitor, Mike said he is with pt at [facility] and the pt left his phone in the car but they will be getting back soon and he will be reconnected in a few minutes. Pt reconnected at 1251 with normal oxygen 96% SpO2.

## 2022-12-04 NOTE — TELEPHONE ENCOUNTER
RPM Notification: Patient Communication  Actions: Device Troubleshoot    Total time (minutes) spent communicating with patient: 1  Pt contacted at 1349 stating his monitor light was blinking green, asked pt if he was able to see his vitals on his phone and he was and stated his light was back to connected and no longer flashing. I let him know his vitals were normal on our end of monitoring and he said thank you for checking and phone call ended.

## 2022-12-05 ENCOUNTER — TELEPHONE (OUTPATIENT)
Dept: OTHER | Facility: MEDICAL CENTER | Age: 77
End: 2022-12-05
Payer: COMMERCIAL

## 2022-12-05 VITALS — HEART RATE: 86 BPM | OXYGEN SATURATION: 95 % | RESPIRATION RATE: 25 BRPM

## 2022-12-05 VITALS — OXYGEN SATURATION: 94 % | HEART RATE: 86 BPM

## 2022-12-05 NOTE — TELEPHONE ENCOUNTER
RPM Notification: Disconnect and Reconnect  Actions: Contacted Patient    Total time (minutes) spent communicating with patient: 1  At 0708, pt disconnected from monitor. Contacted pt via phone call and pt answered stating the light was flashing green. Advised pt to restart cell phone and call back if he was connected or disconnected. Pt called back at 0712, saying he was reconnected and at 0711, dashboard updated and pt was reconnected with 91% SpO2.

## 2022-12-05 NOTE — TELEPHONE ENCOUNTER
RPM Notification: Disconnect  Actions: None    Total time (minutes) spent communicating with patient: 1  At 1320, pt contacted via phone call stating he will be taking a shower so he will be taking off his device soon. Thanked the pt for letting us know.

## 2022-12-05 NOTE — TELEPHONE ENCOUNTER
RPM Notification: Patient Communication  Actions: Contacted Patient    Total time (minutes) spent communicating with patient: 1  At 1404, pt disconnected from monitor to take a shower as stated in previous encounter. At 1421, pt called saying he was taking a shower and was done, and going to change his wristband and get reconnected soon.

## 2022-12-06 ENCOUNTER — TELEPHONE (OUTPATIENT)
Dept: OTHER | Facility: MEDICAL CENTER | Age: 77
End: 2022-12-06
Payer: COMMERCIAL

## 2022-12-06 ENCOUNTER — TELEPHONE (OUTPATIENT)
Dept: OTHER | Facility: MEDICAL CENTER | Age: 77
End: 2022-12-06

## 2022-12-06 NOTE — TELEPHONE ENCOUNTER
RPM Notification: Oxygen DME  Actions: Contacted DME Company    Total time (minutes) spent communicating with patient: 0    Order sent to [employer] to order portable E tanks and rack

## 2022-12-06 NOTE — TELEPHONE ENCOUNTER
RPM Notification: Patient Communication  Actions: None    Total time (minutes) spent communicating with patient: 2    Pt called at 0617 to say he would be going out at about 7 this morning. I asked if he would be staying connected to the monitor, he said he would try, but if he disconnected that would be why. I told him thank you for letting us know and that I would reach out to him if he got disconnected.

## 2022-12-07 NOTE — TELEPHONE ENCOUNTER
RPM Notification: Graduate  Actions: Contacted Patient    Total time (minutes) spent communicating with patient: 2    I called pt at 1651 to inform him that he had been graduated from the RPM program. He no longer had to wear his device, and did not need to be monitored by us anymore. I let him know that he can get rid of any wristbands and sensors that he had left over, but to continue using oxygen as needed and advised by his pulmonologist. I asked if he had any questions, he said he did not, and I told him to have a good rest of his evening.

## 2023-02-14 ENCOUNTER — APPOINTMENT (RX ONLY)
Dept: URBAN - METROPOLITAN AREA CLINIC 6 | Facility: CLINIC | Age: 78
Setting detail: DERMATOLOGY
End: 2023-02-14

## 2023-02-14 DIAGNOSIS — D18.0 HEMANGIOMA: ICD-10-CM

## 2023-02-14 DIAGNOSIS — Z71.89 OTHER SPECIFIED COUNSELING: ICD-10-CM

## 2023-02-14 DIAGNOSIS — L81.4 OTHER MELANIN HYPERPIGMENTATION: ICD-10-CM

## 2023-02-14 DIAGNOSIS — L21.8 OTHER SEBORRHEIC DERMATITIS: ICD-10-CM | Status: IMPROVED

## 2023-02-14 DIAGNOSIS — D22 MELANOCYTIC NEVI: ICD-10-CM

## 2023-02-14 DIAGNOSIS — L82.0 INFLAMED SEBORRHEIC KERATOSIS: ICD-10-CM

## 2023-02-14 DIAGNOSIS — L57.0 ACTINIC KERATOSIS: ICD-10-CM

## 2023-02-14 DIAGNOSIS — L82.1 OTHER SEBORRHEIC KERATOSIS: ICD-10-CM

## 2023-02-14 PROBLEM — D22.61 MELANOCYTIC NEVI OF RIGHT UPPER LIMB, INCLUDING SHOULDER: Status: ACTIVE | Noted: 2023-02-14

## 2023-02-14 PROBLEM — D18.01 HEMANGIOMA OF SKIN AND SUBCUTANEOUS TISSUE: Status: ACTIVE | Noted: 2023-02-14

## 2023-02-14 PROBLEM — D22.62 MELANOCYTIC NEVI OF LEFT UPPER LIMB, INCLUDING SHOULDER: Status: ACTIVE | Noted: 2023-02-14

## 2023-02-14 PROBLEM — D22.5 MELANOCYTIC NEVI OF TRUNK: Status: ACTIVE | Noted: 2023-02-14

## 2023-02-14 PROCEDURE — ? PRESCRIPTION MEDICATION MANAGEMENT

## 2023-02-14 PROCEDURE — ? PRESCRIPTION

## 2023-02-14 PROCEDURE — ? COUNSELING

## 2023-02-14 PROCEDURE — ? DIAGNOSIS COMMENT

## 2023-02-14 PROCEDURE — 17110 DESTRUCTION B9 LES UP TO 14: CPT

## 2023-02-14 PROCEDURE — ? LIQUID NITROGEN

## 2023-02-14 PROCEDURE — 99213 OFFICE O/P EST LOW 20 MIN: CPT | Mod: 25

## 2023-02-14 PROCEDURE — 17000 DESTRUCT PREMALG LESION: CPT | Mod: 59

## 2023-02-14 PROCEDURE — ? SUNSCREEN RECOMMENDATIONS

## 2023-02-14 RX ORDER — TRIAMCINOLONE ACETONIDE 1 MG/G
CREAM TOPICAL
Qty: 80 | Refills: 2 | Status: ERX

## 2023-02-14 ASSESSMENT — LOCATION DETAILED DESCRIPTION DERM
LOCATION DETAILED: RIGHT DISTAL POSTERIOR UPPER ARM
LOCATION DETAILED: RIGHT CENTRAL MALAR CHEEK
LOCATION DETAILED: RIGHT ANTERIOR PROXIMAL UPPER ARM
LOCATION DETAILED: LEFT ANTERIOR DISTAL UPPER ARM
LOCATION DETAILED: INFERIOR MID FOREHEAD
LOCATION DETAILED: RIGHT ANTERIOR DISTAL UPPER ARM
LOCATION DETAILED: RIGHT MEDIAL TRAPEZIAL NECK
LOCATION DETAILED: LEFT SUPERIOR LATERAL BUCCAL CHEEK
LOCATION DETAILED: LEFT PROXIMAL DORSAL FOREARM
LOCATION DETAILED: RIGHT SUPERIOR MEDIAL MIDBACK
LOCATION DETAILED: LEFT MEDIAL INFERIOR CHEST
LOCATION DETAILED: RIGHT MEDIAL UPPER BACK
LOCATION DETAILED: LEFT INFERIOR CENTRAL MALAR CHEEK
LOCATION DETAILED: RIGHT PROXIMAL DORSAL FOREARM
LOCATION DETAILED: LEFT ANTERIOR SHOULDER
LOCATION DETAILED: RIGHT SUPERIOR CRUS OF ANTIHELIX
LOCATION DETAILED: RIGHT LATERAL SUPERIOR CHEST
LOCATION DETAILED: LEFT ANTIHELIX
LOCATION DETAILED: EPIGASTRIC SKIN
LOCATION DETAILED: RIGHT CENTRAL FRONTAL SCALP
LOCATION DETAILED: LEFT DISTAL POSTERIOR UPPER ARM
LOCATION DETAILED: LEFT ANTERIOR PROXIMAL UPPER ARM

## 2023-02-14 ASSESSMENT — LOCATION SIMPLE DESCRIPTION DERM
LOCATION SIMPLE: POSTERIOR NECK
LOCATION SIMPLE: LEFT EAR
LOCATION SIMPLE: LEFT CHEEK
LOCATION SIMPLE: RIGHT SCALP
LOCATION SIMPLE: LEFT SHOULDER
LOCATION SIMPLE: RIGHT UPPER BACK
LOCATION SIMPLE: RIGHT CHEEK
LOCATION SIMPLE: RIGHT EAR
LOCATION SIMPLE: ABDOMEN
LOCATION SIMPLE: CHEST
LOCATION SIMPLE: LEFT FOREARM
LOCATION SIMPLE: RIGHT LOWER BACK
LOCATION SIMPLE: RIGHT FOREARM
LOCATION SIMPLE: RIGHT UPPER ARM
LOCATION SIMPLE: LEFT UPPER ARM
LOCATION SIMPLE: INFERIOR FOREHEAD

## 2023-02-14 ASSESSMENT — LOCATION ZONE DERM
LOCATION ZONE: ARM
LOCATION ZONE: EAR
LOCATION ZONE: FACE
LOCATION ZONE: TRUNK
LOCATION ZONE: NECK
LOCATION ZONE: SCALP

## 2023-02-14 NOTE — PROCEDURE: COUNSELING
Detail Level: Zone
Sunscreen Recommendations: Recommended broad spectrum inorganic or physical sunscreens primarily containing zinc oxide or titanium dioxide.
Detail Level: Detailed

## 2023-02-14 NOTE — PROCEDURE: LIQUID NITROGEN
Medical Necessity Information: It is in your best interest to select a reason for this procedure from the list below. All of these items fulfill various CMS LCD requirements except the new and changing color options.
Number Of Freeze-Thaw Cycles: 2 freeze-thaw cycles
Show Aperture Variable?: Yes
Spray Paint Technique: No
Medical Necessity Clause: This procedure was medically necessary because the lesions that were treated were:
Duration Of Freeze Thaw-Cycle (Seconds): 8
Detail Level: Zone
Spray Paint Text: The liquid nitrogen was applied to the skin utilizing a spray paint frosting technique.
Post-Care Instructions: I reviewed with the patient in detail post-care instructions. Patient is to wear sunprotection, and avoid picking at any of the treated lesions. Pt may apply Vaseline to crusted or scabbing areas.
Consent: The patient's consent was obtained including but not limited to risks of crusting, scabbing, blistering, scarring, darker or lighter pigmentary change, recurrence, incomplete removal and infection.
Application Tool (Optional): Liquid Nitrogen Sprayer
Detail Level: Detailed

## 2023-06-14 PROBLEM — R10.32 LEFT LOWER QUADRANT ABDOMINAL PAIN: Status: ACTIVE | Noted: 2023-06-14

## 2023-07-25 PROBLEM — E11.42 TYPE 2 DIABETES MELLITUS WITH DIABETIC POLYNEUROPATHY, WITH LONG-TERM CURRENT USE OF INSULIN (HCC): Status: ACTIVE | Noted: 2021-07-17

## 2023-07-25 PROBLEM — E78.5 DYSLIPIDEMIA ASSOCIATED WITH TYPE 2 DIABETES MELLITUS (HCC): Status: ACTIVE | Noted: 2023-07-25

## 2023-07-25 PROBLEM — Z79.4 TYPE 2 DIABETES MELLITUS, WITH LONG-TERM CURRENT USE OF INSULIN (HCC): Status: ACTIVE | Noted: 2021-07-17

## 2023-07-25 PROBLEM — E66.811 OBESITY (BMI 30.0-34.9): Status: ACTIVE | Noted: 2023-07-25

## 2023-07-25 PROBLEM — F33.41 RECURRENT MAJOR DEPRESSIVE DISORDER, IN PARTIAL REMISSION (HCC): Status: ACTIVE | Noted: 2023-07-25

## 2023-07-25 PROBLEM — I15.2 HYPERTENSION ASSOCIATED WITH TYPE 2 DIABETES MELLITUS (HCC): Status: ACTIVE | Noted: 2021-07-17

## 2023-07-25 PROBLEM — E11.59 HYPERTENSION ASSOCIATED WITH TYPE 2 DIABETES MELLITUS (HCC): Status: ACTIVE | Noted: 2021-07-17

## 2023-07-25 PROBLEM — E11.42 DIABETIC PERIPHERAL NEUROPATHY (HCC): Status: ACTIVE | Noted: 2023-07-25

## 2023-07-25 PROBLEM — J44.9 COPD (CHRONIC OBSTRUCTIVE PULMONARY DISEASE) (HCC): Status: ACTIVE | Noted: 2022-11-15

## 2023-07-25 PROBLEM — J96.01 ACUTE RESPIRATORY FAILURE WITH HYPOXIA (HCC): Status: RESOLVED | Noted: 2022-11-15 | Resolved: 2023-07-25

## 2023-07-25 PROBLEM — E66.9 OBESITY (BMI 30.0-34.9): Status: ACTIVE | Noted: 2023-07-25

## 2023-07-25 PROBLEM — E11.69 DYSLIPIDEMIA ASSOCIATED WITH TYPE 2 DIABETES MELLITUS (HCC): Status: ACTIVE | Noted: 2023-07-25

## 2023-07-25 PROBLEM — G47.33 OBSTRUCTIVE SLEEP APNEA: Status: ACTIVE | Noted: 2023-07-25

## 2023-07-25 PROBLEM — E66.01 MORBID OBESITY WITH BODY MASS INDEX (BMI) OF 40.0 TO 44.9 IN ADULT (HCC): Status: RESOLVED | Noted: 2022-07-20 | Resolved: 2023-07-25

## 2023-11-17 ENCOUNTER — HOSPITAL ENCOUNTER (OUTPATIENT)
Facility: MEDICAL CENTER | Age: 78
End: 2023-11-17
Attending: NURSE PRACTITIONER
Payer: COMMERCIAL

## 2023-11-17 LAB
FORWARD REASON: SPWHY: NORMAL
FORWARDED TO LAB: SPWHR: NORMAL
SPECIMEN SENT (2ND): SPWT2: NORMAL
SPECIMEN SENT: SPWT1: NORMAL

## 2024-05-01 ENCOUNTER — APPOINTMENT (OUTPATIENT)
Dept: RADIOLOGY | Facility: MEDICAL CENTER | Age: 79
DRG: 193 | End: 2024-05-01
Attending: EMERGENCY MEDICINE
Payer: COMMERCIAL

## 2024-05-01 ENCOUNTER — HOSPITAL ENCOUNTER (INPATIENT)
Facility: MEDICAL CENTER | Age: 79
LOS: 8 days | DRG: 193 | End: 2024-05-09
Attending: EMERGENCY MEDICINE | Admitting: INTERNAL MEDICINE
Payer: COMMERCIAL

## 2024-05-01 DIAGNOSIS — J18.9 COMMUNITY ACQUIRED PNEUMONIA, UNSPECIFIED LATERALITY: ICD-10-CM

## 2024-05-01 DIAGNOSIS — G20.B2 PARKINSON'S DISEASE WITH DYSKINESIA AND FLUCTUATING MANIFESTATIONS (HCC): ICD-10-CM

## 2024-05-01 DIAGNOSIS — G47.00 INSOMNIA, UNSPECIFIED TYPE: ICD-10-CM

## 2024-05-01 DIAGNOSIS — R53.1 WEAKNESS: ICD-10-CM

## 2024-05-01 DIAGNOSIS — J18.9 PNEUMONIA DUE TO INFECTIOUS ORGANISM, UNSPECIFIED LATERALITY, UNSPECIFIED PART OF LUNG: ICD-10-CM

## 2024-05-01 DIAGNOSIS — E11.42 TYPE 2 DIABETES MELLITUS WITH DIABETIC POLYNEUROPATHY, WITH LONG-TERM CURRENT USE OF INSULIN (HCC): ICD-10-CM

## 2024-05-01 DIAGNOSIS — R73.9 HYPERGLYCEMIA: ICD-10-CM

## 2024-05-01 DIAGNOSIS — J96.01 ACUTE RESPIRATORY FAILURE WITH HYPOXIA (HCC): ICD-10-CM

## 2024-05-01 DIAGNOSIS — R09.02 HYPOXIA: ICD-10-CM

## 2024-05-01 DIAGNOSIS — H26.9 CATARACT OF BOTH EYES, UNSPECIFIED CATARACT TYPE: ICD-10-CM

## 2024-05-01 DIAGNOSIS — R19.7 DIARRHEA, UNSPECIFIED TYPE: ICD-10-CM

## 2024-05-01 DIAGNOSIS — D72.829 LEUKOCYTOSIS, UNSPECIFIED TYPE: ICD-10-CM

## 2024-05-01 DIAGNOSIS — Z79.4 TYPE 2 DIABETES MELLITUS WITH DIABETIC POLYNEUROPATHY, WITH LONG-TERM CURRENT USE OF INSULIN (HCC): ICD-10-CM

## 2024-05-01 PROBLEM — I48.92 ATRIAL FLUTTER (HCC): Status: ACTIVE | Noted: 2024-05-01

## 2024-05-01 PROBLEM — M25.551 RIGHT HIP PAIN: Status: ACTIVE | Noted: 2024-05-01

## 2024-05-01 LAB
ALBUMIN SERPL BCP-MCNC: 3.6 G/DL (ref 3.2–4.9)
ALBUMIN/GLOB SERPL: 1.2 G/DL
ALP SERPL-CCNC: 91 U/L (ref 30–99)
ALT SERPL-CCNC: <5 U/L (ref 2–50)
ANION GAP SERPL CALC-SCNC: 17 MMOL/L (ref 7–16)
APPEARANCE UR: CLEAR
AST SERPL-CCNC: 9 U/L (ref 12–45)
BACTERIA #/AREA URNS HPF: NEGATIVE /HPF
BASOPHILS # BLD AUTO: 0.2 % (ref 0–1.8)
BASOPHILS # BLD: 0.03 K/UL (ref 0–0.12)
BILIRUB SERPL-MCNC: 0.6 MG/DL (ref 0.1–1.5)
BILIRUB UR QL STRIP.AUTO: NEGATIVE
BUN SERPL-MCNC: 20 MG/DL (ref 8–22)
CALCIUM ALBUM COR SERPL-MCNC: 9.1 MG/DL (ref 8.5–10.5)
CALCIUM SERPL-MCNC: 8.8 MG/DL (ref 8.5–10.5)
CHLORIDE SERPL-SCNC: 99 MMOL/L (ref 96–112)
CO2 SERPL-SCNC: 19 MMOL/L (ref 20–33)
COLOR UR: YELLOW
CREAT SERPL-MCNC: 0.91 MG/DL (ref 0.5–1.4)
EKG IMPRESSION: NORMAL
EOSINOPHIL # BLD AUTO: 0 K/UL (ref 0–0.51)
EOSINOPHIL NFR BLD: 0 % (ref 0–6.9)
EPI CELLS #/AREA URNS HPF: NEGATIVE /HPF
ERYTHROCYTE [DISTWIDTH] IN BLOOD BY AUTOMATED COUNT: 43.3 FL (ref 35.9–50)
GFR SERPLBLD CREATININE-BSD FMLA CKD-EPI: 87 ML/MIN/1.73 M 2
GLOBULIN SER CALC-MCNC: 3 G/DL (ref 1.9–3.5)
GLUCOSE BLD STRIP.AUTO-MCNC: 207 MG/DL (ref 65–99)
GLUCOSE BLD STRIP.AUTO-MCNC: 238 MG/DL (ref 65–99)
GLUCOSE BLD STRIP.AUTO-MCNC: 271 MG/DL (ref 65–99)
GLUCOSE BLD STRIP.AUTO-MCNC: 272 MG/DL (ref 65–99)
GLUCOSE BLD STRIP.AUTO-MCNC: 296 MG/DL (ref 65–99)
GLUCOSE SERPL-MCNC: 320 MG/DL (ref 65–99)
GLUCOSE UR STRIP.AUTO-MCNC: >=1000 MG/DL
HCT VFR BLD AUTO: 44.4 % (ref 42–52)
HGB BLD-MCNC: 15.4 G/DL (ref 14–18)
HYALINE CASTS #/AREA URNS LPF: ABNORMAL /LPF
IMM GRANULOCYTES # BLD AUTO: 0.06 K/UL (ref 0–0.11)
IMM GRANULOCYTES NFR BLD AUTO: 0.4 % (ref 0–0.9)
KETONES UR STRIP.AUTO-MCNC: 40 MG/DL
LEUKOCYTE ESTERASE UR QL STRIP.AUTO: NEGATIVE
LYMPHOCYTES # BLD AUTO: 0.62 K/UL (ref 1–4.8)
LYMPHOCYTES NFR BLD: 4.3 % (ref 22–41)
MAGNESIUM SERPL-MCNC: 1.6 MG/DL (ref 1.5–2.5)
MCH RBC QN AUTO: 31.8 PG (ref 27–33)
MCHC RBC AUTO-ENTMCNC: 34.7 G/DL (ref 32.3–36.5)
MCV RBC AUTO: 91.7 FL (ref 81.4–97.8)
MICRO URNS: ABNORMAL
MONOCYTES # BLD AUTO: 0.65 K/UL (ref 0–0.85)
MONOCYTES NFR BLD AUTO: 4.6 % (ref 0–13.4)
MUCOUS THREADS #/AREA URNS HPF: ABNORMAL /HPF
NEUTROPHILS # BLD AUTO: 12.92 K/UL (ref 1.82–7.42)
NEUTROPHILS NFR BLD: 90.5 % (ref 44–72)
NITRITE UR QL STRIP.AUTO: NEGATIVE
NRBC # BLD AUTO: 0 K/UL
NRBC BLD-RTO: 0 /100 WBC (ref 0–0.2)
PH UR STRIP.AUTO: 5 [PH] (ref 5–8)
PLATELET # BLD AUTO: 173 K/UL (ref 164–446)
PMV BLD AUTO: 10.8 FL (ref 9–12.9)
POTASSIUM SERPL-SCNC: 4.3 MMOL/L (ref 3.6–5.5)
PROT SERPL-MCNC: 6.6 G/DL (ref 6–8.2)
PROT UR QL STRIP: 30 MG/DL
RBC # BLD AUTO: 4.84 M/UL (ref 4.7–6.1)
RBC # URNS HPF: ABNORMAL /HPF
RBC UR QL AUTO: NEGATIVE
SODIUM SERPL-SCNC: 135 MMOL/L (ref 135–145)
SP GR UR STRIP.AUTO: 1.04
TROPONIN T SERPL-MCNC: 17 NG/L (ref 6–19)
UROBILINOGEN UR STRIP.AUTO-MCNC: 0.2 MG/DL
WBC # BLD AUTO: 14.3 K/UL (ref 4.8–10.8)
WBC #/AREA URNS HPF: ABNORMAL /HPF

## 2024-05-01 PROCEDURE — 99223 1ST HOSP IP/OBS HIGH 75: CPT | Mod: AI | Performed by: INTERNAL MEDICINE

## 2024-05-01 RX ORDER — DEXTROSE MONOHYDRATE 25 G/50ML
25 INJECTION, SOLUTION INTRAVENOUS
Status: DISCONTINUED | OUTPATIENT
Start: 2024-05-01 | End: 2024-05-09 | Stop reason: HOSPADM

## 2024-05-01 RX ORDER — ONDANSETRON 2 MG/ML
4 INJECTION INTRAMUSCULAR; INTRAVENOUS EVERY 4 HOURS PRN
Status: DISCONTINUED | OUTPATIENT
Start: 2024-05-01 | End: 2024-05-09 | Stop reason: HOSPADM

## 2024-05-01 RX ORDER — AZITHROMYCIN 250 MG/1
500 TABLET, FILM COATED ORAL ONCE
Status: COMPLETED | OUTPATIENT
Start: 2024-05-01 | End: 2024-05-01

## 2024-05-01 RX ORDER — ENOXAPARIN SODIUM 100 MG/ML
40 INJECTION SUBCUTANEOUS DAILY
Status: DISCONTINUED | OUTPATIENT
Start: 2024-05-01 | End: 2024-05-09 | Stop reason: HOSPADM

## 2024-05-01 RX ORDER — LOSARTAN POTASSIUM 50 MG/1
50 TABLET ORAL DAILY
Status: DISCONTINUED | OUTPATIENT
Start: 2024-05-02 | End: 2024-05-09 | Stop reason: HOSPADM

## 2024-05-01 RX ORDER — DONEPEZIL HYDROCHLORIDE 5 MG/1
10 TABLET, FILM COATED ORAL NIGHTLY
Status: DISCONTINUED | OUTPATIENT
Start: 2024-05-01 | End: 2024-05-09 | Stop reason: HOSPADM

## 2024-05-01 RX ORDER — OMEPRAZOLE 20 MG/1
20 CAPSULE, DELAYED RELEASE ORAL EVERY MORNING
COMMUNITY

## 2024-05-01 RX ORDER — UREA 10 %
3 LOTION (ML) TOPICAL
Status: DISCONTINUED | OUTPATIENT
Start: 2024-05-01 | End: 2024-05-09 | Stop reason: HOSPADM

## 2024-05-01 RX ORDER — ACETAMINOPHEN 325 MG/1
650 TABLET ORAL ONCE
Status: COMPLETED | OUTPATIENT
Start: 2024-05-01 | End: 2024-05-01

## 2024-05-01 RX ORDER — ALBUTEROL SULFATE 90 UG/1
2 AEROSOL, METERED RESPIRATORY (INHALATION) EVERY 4 HOURS PRN
Status: DISCONTINUED | OUTPATIENT
Start: 2024-05-01 | End: 2024-05-09 | Stop reason: HOSPADM

## 2024-05-01 RX ORDER — AZITHROMYCIN 250 MG/1
500 TABLET, FILM COATED ORAL DAILY
Qty: 6 TABLET | Refills: 0 | Status: COMPLETED | OUTPATIENT
Start: 2024-05-01 | End: 2024-05-03

## 2024-05-01 RX ORDER — CETIRIZINE HYDROCHLORIDE 10 MG/1
10 TABLET ORAL DAILY
Status: DISCONTINUED | OUTPATIENT
Start: 2024-05-01 | End: 2024-05-01

## 2024-05-01 RX ORDER — FAMOTIDINE 20 MG/1
20 TABLET, FILM COATED ORAL
Status: DISCONTINUED | OUTPATIENT
Start: 2024-05-02 | End: 2024-05-09 | Stop reason: HOSPADM

## 2024-05-01 RX ORDER — DOCUSATE SODIUM 100 MG/1
100 CAPSULE, LIQUID FILLED ORAL 2 TIMES DAILY
Status: DISCONTINUED | OUTPATIENT
Start: 2024-05-01 | End: 2024-05-01

## 2024-05-01 RX ORDER — MIRABEGRON 25 MG/1
25 TABLET, FILM COATED, EXTENDED RELEASE ORAL DAILY
Status: DISCONTINUED | OUTPATIENT
Start: 2024-05-01 | End: 2024-05-01

## 2024-05-01 RX ORDER — MEMANTINE HYDROCHLORIDE 10 MG/1
10 TABLET ORAL 2 TIMES DAILY
Status: DISCONTINUED | OUTPATIENT
Start: 2024-05-01 | End: 2024-05-09 | Stop reason: HOSPADM

## 2024-05-01 RX ORDER — ONDANSETRON 4 MG/1
4 TABLET, ORALLY DISINTEGRATING ORAL EVERY 4 HOURS PRN
Status: DISCONTINUED | OUTPATIENT
Start: 2024-05-01 | End: 2024-05-09 | Stop reason: HOSPADM

## 2024-05-01 RX ORDER — PRAVASTATIN SODIUM 20 MG
20 TABLET ORAL NIGHTLY
Status: DISCONTINUED | OUTPATIENT
Start: 2024-05-01 | End: 2024-05-09 | Stop reason: HOSPADM

## 2024-05-01 RX ORDER — CARBIDOPA AND LEVODOPA 50; 200 MG/1; MG/1
1 TABLET, EXTENDED RELEASE ORAL
Status: DISCONTINUED | OUTPATIENT
Start: 2024-05-01 | End: 2024-05-01

## 2024-05-01 RX ORDER — ROPINIROLE 0.5 MG/1
0.5 TABLET, FILM COATED ORAL 3 TIMES DAILY
Status: DISCONTINUED | OUTPATIENT
Start: 2024-05-01 | End: 2024-05-09 | Stop reason: HOSPADM

## 2024-05-01 RX ORDER — SODIUM CHLORIDE, SODIUM LACTATE, POTASSIUM CHLORIDE, CALCIUM CHLORIDE 600; 310; 30; 20 MG/100ML; MG/100ML; MG/100ML; MG/100ML
1000 INJECTION, SOLUTION INTRAVENOUS ONCE
Status: COMPLETED | OUTPATIENT
Start: 2024-05-01 | End: 2024-05-01

## 2024-05-01 RX ORDER — TAMSULOSIN HYDROCHLORIDE 0.4 MG/1
0.4 CAPSULE ORAL
Status: DISCONTINUED | OUTPATIENT
Start: 2024-05-02 | End: 2024-05-09 | Stop reason: HOSPADM

## 2024-05-01 RX ORDER — FLUOXETINE 10 MG/1
10 CAPSULE ORAL 2 TIMES DAILY
Status: DISCONTINUED | OUTPATIENT
Start: 2024-05-01 | End: 2024-05-09 | Stop reason: HOSPADM

## 2024-05-01 RX ORDER — CARBIDOPA AND LEVODOPA 50; 200 MG/1; MG/1
1 TABLET, EXTENDED RELEASE ORAL
Status: DISCONTINUED | OUTPATIENT
Start: 2024-05-01 | End: 2024-05-09 | Stop reason: HOSPADM

## 2024-05-01 RX ADMIN — DONEPEZIL HYDROCHLORIDE 10 MG: 5 TABLET, FILM COATED ORAL at 20:59

## 2024-05-01 RX ADMIN — CARBIDOPA AND LEVODOPA 1 TABLET: 50; 200 TABLET, EXTENDED RELEASE ORAL at 17:27

## 2024-05-01 RX ADMIN — AMPICILLIN AND SULBACTAM 3 G: 1; 2 INJECTION, POWDER, FOR SOLUTION INTRAMUSCULAR; INTRAVENOUS at 23:37

## 2024-05-01 RX ADMIN — MEMANTINE HYDROCHLORIDE 10 MG: 10 TABLET ORAL at 17:27

## 2024-05-01 RX ADMIN — FLUOXETINE HYDROCHLORIDE 10 MG: 10 CAPSULE ORAL at 17:27

## 2024-05-01 RX ADMIN — AMPICILLIN AND SULBACTAM 3 G: 1; 2 INJECTION, POWDER, FOR SOLUTION INTRAMUSCULAR; INTRAVENOUS at 11:45

## 2024-05-01 RX ADMIN — INSULIN HUMAN 7 UNITS: 100 INJECTION, SOLUTION PARENTERAL at 17:33

## 2024-05-01 RX ADMIN — INSULIN GLARGINE-YFGN 40 UNITS: 100 INJECTION, SOLUTION SUBCUTANEOUS at 17:33

## 2024-05-01 RX ADMIN — PRAVASTATIN SODIUM 20 MG: 20 TABLET ORAL at 20:59

## 2024-05-01 RX ADMIN — ACETAMINOPHEN 650 MG: 325 TABLET, FILM COATED ORAL at 06:56

## 2024-05-01 RX ADMIN — INSULIN HUMAN 7 UNITS: 100 INJECTION, SOLUTION PARENTERAL at 14:28

## 2024-05-01 RX ADMIN — ENOXAPARIN SODIUM 40 MG: 100 INJECTION SUBCUTANEOUS at 17:27

## 2024-05-01 RX ADMIN — AMPICILLIN AND SULBACTAM 3 G: 1; 2 INJECTION, POWDER, FOR SOLUTION INTRAMUSCULAR; INTRAVENOUS at 17:26

## 2024-05-01 RX ADMIN — Medication 3 MG: at 21:05

## 2024-05-01 RX ADMIN — AMPICILLIN AND SULBACTAM 3 G: 1; 2 INJECTION, POWDER, FOR SOLUTION INTRAMUSCULAR; INTRAVENOUS at 15:18

## 2024-05-01 RX ADMIN — ROPINIROLE HYDROCHLORIDE 0.5 MG: 0.5 TABLET, FILM COATED ORAL at 20:59

## 2024-05-01 RX ADMIN — INSULIN HUMAN 5 UNITS: 100 INJECTION, SOLUTION PARENTERAL at 09:17

## 2024-05-01 RX ADMIN — INSULIN HUMAN 4 UNITS: 100 INJECTION, SOLUTION PARENTERAL at 21:14

## 2024-05-01 RX ADMIN — AZITHROMYCIN DIHYDRATE 500 MG: 250 TABLET, FILM COATED ORAL at 15:15

## 2024-05-01 RX ADMIN — CARBIDOPA AND LEVODOPA 1 TABLET: 50; 200 TABLET, EXTENDED RELEASE ORAL at 21:00

## 2024-05-01 RX ADMIN — AZITHROMYCIN DIHYDRATE 500 MG: 250 TABLET, FILM COATED ORAL at 11:44

## 2024-05-01 RX ADMIN — SODIUM CHLORIDE, POTASSIUM CHLORIDE, SODIUM LACTATE AND CALCIUM CHLORIDE 1000 ML: 600; 310; 30; 20 INJECTION, SOLUTION INTRAVENOUS at 09:09

## 2024-05-01 ASSESSMENT — LIFESTYLE VARIABLES
EVER FELT BAD OR GUILTY ABOUT YOUR DRINKING: NO
TOTAL SCORE: 0
CONSUMPTION TOTAL: NEGATIVE
HAVE PEOPLE ANNOYED YOU BY CRITICIZING YOUR DRINKING: NO
TOTAL SCORE: 0
HOW MANY TIMES IN THE PAST YEAR HAVE YOU HAD 5 OR MORE DRINKS IN A DAY: 0
AVERAGE NUMBER OF DAYS PER WEEK YOU HAVE A DRINK CONTAINING ALCOHOL: 0
ON A TYPICAL DAY WHEN YOU DRINK ALCOHOL HOW MANY DRINKS DO YOU HAVE: 0
TOTAL SCORE: 0
EVER HAD A DRINK FIRST THING IN THE MORNING TO STEADY YOUR NERVES TO GET RID OF A HANGOVER: NO
HAVE YOU EVER FELT YOU SHOULD CUT DOWN ON YOUR DRINKING: NO
ALCOHOL_USE: NO
DOES PATIENT WANT TO STOP DRINKING: NO

## 2024-05-01 ASSESSMENT — COGNITIVE AND FUNCTIONAL STATUS - GENERAL
TURNING FROM BACK TO SIDE WHILE IN FLAT BAD: A LOT
MOVING TO AND FROM BED TO CHAIR: A LOT
DAILY ACTIVITIY SCORE: 14
MOBILITY SCORE: 11
HELP NEEDED FOR BATHING: A LOT
DRESSING REGULAR LOWER BODY CLOTHING: A LOT
TOILETING: A LOT
MOVING FROM LYING ON BACK TO SITTING ON SIDE OF FLAT BED: A LOT
DRESSING REGULAR UPPER BODY CLOTHING: A LOT
EATING MEALS: A LITTLE
CLIMB 3 TO 5 STEPS WITH RAILING: TOTAL
STANDING UP FROM CHAIR USING ARMS: A LOT
SUGGESTED CMS G CODE MODIFIER DAILY ACTIVITY: CK
WALKING IN HOSPITAL ROOM: A LOT
PERSONAL GROOMING: A LITTLE
SUGGESTED CMS G CODE MODIFIER MOBILITY: CL

## 2024-05-01 ASSESSMENT — ENCOUNTER SYMPTOMS
WEAKNESS: 1
SPUTUM PRODUCTION: 1
ABDOMINAL PAIN: 0
PALPITATIONS: 0
MYALGIAS: 1
WHEEZING: 0
NAUSEA: 0
CHILLS: 0
COUGH: 1
VOMITING: 0
SHORTNESS OF BREATH: 1
FALLS: 1
SENSORY CHANGE: 1
FEVER: 0
HEMOPTYSIS: 0

## 2024-05-01 ASSESSMENT — FIBROSIS 4 INDEX: FIB4 SCORE: 1.32

## 2024-05-01 ASSESSMENT — PATIENT HEALTH QUESTIONNAIRE - PHQ9
1. LITTLE INTEREST OR PLEASURE IN DOING THINGS: NOT AT ALL
2. FEELING DOWN, DEPRESSED, IRRITABLE, OR HOPELESS: NOT AT ALL
SUM OF ALL RESPONSES TO PHQ9 QUESTIONS 1 AND 2: 0

## 2024-05-01 NOTE — ED NOTES
Admitting provider bedside. ERP made aware of pt wound found by break RN. Wound consult ordered by ERP.

## 2024-05-01 NOTE — H&P
Hospital Medicine History & Physical Note    Date of Service  5/1/2024    Primary Care Physician  Pcp Pt States None    Consultants  none    Specialist Names: none    Code Status  DNAR/DNI    Chief Complaint  Chief Complaint   Patient presents with    T-5000 GLF       History of Presenting Illness  Mc Krishnamurthy is a 78 y.o. male who presented 5/1/2024 with Parkinson's disease, type 2 diabetes mellitus, COPD and hypertension who presents to the hospital from his assisted living facility.  Patient states that he is weak and fell from his walker and landed on his right hip and had pain.  He also endorses continued and worsening shortness of breath with productive cough with greenish sputum but no blood.  Denies any nausea vomiting fevers or chills.  Denies any diarrhea.  In the emergency room he was found to be hypoxic on room air to 87% and was placed on oxygen supplementation.  Chest x-ray was concerning for bibasilar pneumonia was placed on empiric IV Unasyn and azithromycin.  His hip x-ray was negative for fracture.  He also was found to be persistently hypoglycemic with very mild DKA and was given multiple rounds of IV insulin in the ER.    I personally spoke with the ER physician in detail about this patient's case    I discussed the plan of care with patient.    Review of Systems  Review of Systems   Constitutional:  Positive for malaise/fatigue. Negative for chills and fever.   Respiratory:  Positive for cough, sputum production and shortness of breath. Negative for hemoptysis and wheezing.    Cardiovascular:  Negative for chest pain and palpitations.   Gastrointestinal:  Negative for abdominal pain, nausea and vomiting.   Musculoskeletal:  Positive for falls, joint pain and myalgias.   Neurological:  Positive for sensory change and weakness (near his baseline).       Past Medical History   has a past medical history of Acute respiratory failure with hypoxia (HCC) (11/15/2022), Cognitive impairment, Morbid  obesity with body mass index (BMI) of 40.0 to 44.9 in adult ([facility]) (2022), and Parkinson disease ([facility]).    Surgical History   has no past surgical history on file.     Family History  family history is not on file.   Family history reviewed with patient. There is no family history that is pertinent to the chief complaint.     Social History   reports that he has quit smoking. His smoking use included cigarettes. He does not have any smokeless tobacco history on file. He reports that he does not currently use alcohol. He reports that he does not currently use drugs.    Allergies  No Known Allergies    Medications  Prior to Admission Medications   Prescriptions Last Dose Informant Patient Reported? Taking?   ANTACID ULTRA STRENGTH 1000 MG Chew Tab   No No   Sig: TAKE 2 TABLETS (2000MG) BY MOUTH THREE TIMES A DAY  AS NEEDED FOR DYSPEPSIA   Patient taking differently: Chew 2 Tablets every 8 hours as needed (DYSPEPSIA).   Carbidopa-Levodopa ER (RYTARY) 48. MG Cap CR   Yes No   Sig: Take 3 Capsules by mouth 4 times a day. Administer 6am, 10am, 2pm, and 6pm   Cholecalciferol (VITAMIN D3) 125 MCG (5000 UT) Cap   No No   Sig: Take 1 Capsule by mouth every day.   Continuous Blood Gluc  (FREESTYLE BEAN 2 READER) Device   Yes No   Sig: Apply 1 Each topically continuous.   Continuous Blood Gluc Sensor (FREESTYLE BEAN 2 SENSOR) Misc   No No   Si Each every 14 days.   FLUoxetine (PROZAC) 10 MG Cap   No No   Sig: TAKE 1 CAPSULE BY MOUTH TWICE DAILY FOR DEPRESSION   Multiple Vitamin (MULTIVITAMIN) Tab   No No   Sig: TAKE 1 TABLET BY MOUTH ONCE DAILY   Nutritional Supplements (GLUCERNA ADVANCE SHAKE PO)   Yes No   Sig: Take 1 Can by mouth 1 time a day as needed (FOR MISSED MEALS OR SNACKS).   Probiotic Product (PROBIOTIC DAILY) Cap   No No   Sig: Take 1 Capsule by mouth every day.   ROBAFEN DM  MG/20ML Liquid   No No   Sig: TAKE 10MLS BY MOUTH THREE TIMES A DAY  AS NEEDED FOR COUGH OR CONGESTION FOR UP  TO 30 DAYS   ROPINIRole (REQUIP) 0.25 MG Tab   Yes No   Sig: Take 2 Tablets by mouth 3 times a day.   Sharps Container (BD SHARPS  XL) Misc   No No   Si Container every 30 days.   Simethicone 250 MG Cap   No No   Sig: Take 1 Capsule by mouth 3 times a day.   ULTICARE MICRO PEN NEEDLES   No No   Sig: USE AS DIRECTED   acetaminophen (ACETAMINOPHEN EXTRA STRENGTH) 500 MG Tab   No No   Sig: Take 2 Tablets by mouth every 8 hours as needed (For back, joint,  or soft tissue pain, or headache.). Max dose 3gm/day from all sources.   Patient taking differently: Take 1,000 mg by mouth every 6 hours as needed (For back, joint,  or soft tissue pain, or headache.). Max dose 3gm/day from all sources.   albuterol 108 (90 Base) MCG/ACT Aero Soln inhalation aerosol   Yes No   Sig: Inhale 2 Puffs every four hours as needed for Shortness of Breath.   azithromycin (ZITHROMAX) 250 MG Tab   No No   Sig: Take 1 Tablet by mouth see administration instructions.   carbidopa-levodopa SR (SINEMET CR)  MG per tablet   Yes No   Sig: Take 1 Tablet by mouth at bedtime.   carboxymethylcellulose (REFRESH TEARS) 0.5 % Solution   No No   Sig: Administer 1 Drop into both eyes in the morning, at noon, and at bedtime.   Patient taking differently: Administer 1 Drop into both eyes 2 (two) times a day.   cetirizine (ZYRTEC) 10 MG Tab   No No   Sig: Take 1 Tablet by mouth every day.   docusate sodium (COLACE) 100 MG Cap   No No   Sig: Take 1 Capsule by mouth 2 times a day.   donepezil (ARICEPT) 10 MG tablet   No No   Sig: TAKE 1 TABLET BY MOUTH TWICE DAILY   Patient taking differently: Take 10 mg by mouth every evening.   famotidine (PEPCID) 20 MG Tab   No No   Sig: Take 1 Tablet by mouth every morning before breakfast.   glucose 4 GM chewable tablet   No No   Sig: Chew 1 Tablet as needed for Low Blood Sugar.   insulin glargine (LANTUS SOLOSTAR) 100 UNIT/ML Solution Pen-injector injection   No No   Sig: Inject 40 Units under the skin every  evening.   insulin lispro 100 UNIT/ML SC SOPN injection PEN   No No   Sig: Inject 11 units SQ  BID   ketoconazole (NIZORAL) 2 % shampoo   Yes No   Sig: Apply  topically every 48 hours. APPLY TO SCALP, FACE AND GROIN, LET SIT FOR 3 TO 5 MINUTES THEN RINSE. ONCE IMPROVED, CONTINUE ONCE WEEKLY AS MAINTENANCE   lactobacillus rhamnosus (CULTURELLE) Cap capsule   Yes No   Sig: Take 1 Capsule by mouth every morning with breakfast.   losartan (COZAAR) 50 MG Tab   Yes No   Sig: Take 50 mg by mouth every day.   magnesium hydroxide (MILK OF MAGNESIA) 400 MG/5ML Suspension   Yes No   Sig: Take 30 mL by mouth every 12 hours as needed.   melatonin 3 MG Tab   No No   Sig: Take 1 Tablet by mouth at bedtime.   memantine (NAMENDA) 10 MG Tab   Yes No   Sig: Take 10 mg by mouth 2 times a day.   metFORMIN (GLUCOPHAGE) 500 MG Tab   Yes No   Sig: Take 1,000 mg by mouth 2 times a day.   mirabegron ER (MYRBETRIQ) 25 MG TABLET SR 24 HR   Yes No   Sig: Take 25 mg by mouth every day.   nystatin (MYCOSTATIN) 519373 UNIT/GM Cream topical cream   Yes No   Sig: Apply 1 Application. topically 2 times a day.   polyethylene glycol 3350 (MIRALAX) 17 GM/SCOOP Powder   No No   Sig: TAKE 17GM MIXED IN 8OZ WATER AND DRINK BY MOUTH ONCE DAILY AS NEEDED FOR CONSTIPATION   pravastatin (PRAVACHOL) 20 MG Tab   No No   Sig: Take 1 Tablet by mouth every day.   tamsulosin (FLOMAX) 0.4 MG capsule   No No   Sig: Take 1 Capsule by mouth 1/2 hour after breakfast.   triamcinolone acetonide (KENALOG) 0.1 % Cream   Yes No   Sig: Apply 1 Application topically every 12 hours as needed (FOR RASH ON NECK AND BUTTOCKS).      Facility-Administered Medications: None       Physical Exam  Temp:  [36.9 °C (98.4 °F)] 36.9 °C (98.4 °F)  Pulse:  [] 100  Resp:  [15-40] 40  BP: (128-168)/(66-77) 168/77  SpO2:  [88 %-97 %] 97 %  Blood Pressure : (!) 142/66   Temperature: 36.9 °C (98.4 °F)   Pulse: 94   Respiration: (!) 22   Pulse Oximetry: 93 %       Physical Exam  Vitals and  nursing note reviewed.   Constitutional:       General: He is not in acute distress.     Appearance: He is well-developed.      Comments: Frail appearing  Constant baseline shaking noted   HENT:      Head: Normocephalic and atraumatic.      Nose:      Comments: NC in place     Mouth/Throat:      Pharynx: No oropharyngeal exudate.   Eyes:      General: No scleral icterus.     Pupils: Pupils are equal, round, and reactive to light.   Neck:      Thyroid: No thyromegaly.   Cardiovascular:      Rate and Rhythm: Normal rate and regular rhythm.      Heart sounds: Normal heart sounds. No murmur heard.  Pulmonary:      Effort: Pulmonary effort is normal. No respiratory distress.      Breath sounds: Rales (bi-basilar) present. No wheezing.   Abdominal:      General: Bowel sounds are normal. There is no distension.      Palpations: Abdomen is soft.      Tenderness: There is no abdominal tenderness. There is no guarding or rebound.   Musculoskeletal:         General: No tenderness. Normal range of motion.      Cervical back: Normal range of motion and neck supple.      Right lower leg: Edema present.      Left lower leg: Edema present.      Comments: 1+ pitting edema B/L LE's  Severe brawnish induration B/L pre-tibial areas  No clear open skin lesions   Skin:     General: Skin is warm and dry.      Findings: No rash.   Neurological:      Mental Status: He is alert and oriented to person, place, and time.      Cranial Nerves: No cranial nerve deficit.      Coordination: Coordination abnormal.      Gait: Gait abnormal.      Deep Tendon Reflexes: Reflexes abnormal.         Laboratory:  Recent Labs     05/01/24 0613   WBC 14.3*   RBC 4.84   HEMOGLOBIN 15.4   HEMATOCRIT 44.4   MCV 91.7   MCH 31.8   MCHC 34.7   RDW 43.3   PLATELETCT 173   MPV 10.8     Recent Labs     05/01/24  0613   SODIUM 135   POTASSIUM 4.3   CHLORIDE 99   CO2 19*   GLUCOSE 320*   BUN 20   CREATININE 0.91   CALCIUM 8.8     Recent Labs     05/01/24 0613   ALTSGPT  "<5   ASTSGOT 9*   ALKPHOSPHAT 91   TBILIRUBIN 0.6   GLUCOSE 320*         No results for input(s): \"NTPROBNP\" in the last 72 hours.      Recent Labs     05/01/24  0613   TROPONINT 17       Imaging:  DX-CHEST-PORTABLE (1 VIEW)   Final Result      Bilateral basilar atelectasis and/or consolidation. Underlying infection is possible.      DX-HIP-COMPLETE - UNILATERAL 2+ RIGHT   Final Result         1.  No radiographic evidence of acute traumatic injury.      EC-ECHOCARDIOGRAM COMPLETE W/ CONT    (Results Pending)       I personally reviewed the chest x-ray which shows bibasilar consolidations    I personally reviewed EKG which shows possible atrial flutter with heart rate of 97 with no clear evidence of acute ST segment changes    I personally reviewed the UA, CBC, serial troponin, BMP.    Assessment/Plan:  Justification for Admission Status  I anticipate this patient will require at least two midnights for appropriate medical management, necessitating inpatient admission because acute respiratory failure with hypoxia of community-acquired pneumonia in the setting of multiple medical comorbidities    Patient will need a Med/Surg bed on MEDICAL service .  The need is secondary to above.    * Acute respiratory failure with hypoxia (HCC)- (present on admission)  Assessment & Plan  Presumed 2/2 CAP  See below  O2 supplementation    Right hip pain- (present on admission)  Assessment & Plan  From fall this AM  Initial Hip xray negative for obvious fracture  Some persistent pain noted on exam  If persists, low threshold get cT hip to r/o occult fracture  PT/OT eval    Atrial flutter (HCC)- (present on admission)  Assessment & Plan  Possibly new  Repeat EKG tomorrow  HR controlled  Not a good candidate for AC  Defer bb for now  Check ECHO    CAP (community acquired pneumonia)- (present on admission)  Assessment & Plan  Continue empiric IV unasyn and oral azithromycin  Check Mrsa nares  Sputum and blood cultures  SLP eval for " swallowing issues, especially in light of his long-standing Parkinson's    Recurrent major depressive disorder, in partial remission ([facility])- (present on admission)  Assessment & Plan  Prozac 10 mg daily    Dyslipidemia associated with type 2 diabetes mellitus ([facility])- (present on admission)  Assessment & Plan  Continue pravastatin    BMI 33.0-33.9,adult- (present on admission)  Assessment & Plan  Outpatient nutritional counseling    COPD (chronic obstructive pulmonary disease) ([facility])- (present on admission)  Assessment & Plan  No clear exacerbation at this time  BD's and adjust prn    GERD (gastroesophageal reflux disease)- (present on admission)  Assessment & Plan  Pepcid 20 mg daily    Hypertension associated with type 2 diabetes mellitus ([facility])- (present on admission)  Assessment & Plan  Continue losartan 50 mg daily    Anxiety disorder- (present on admission)  Assessment & Plan  Continue prozac 10 mg daily    Urinary incontinence- (present on admission)  Assessment & Plan  Continue mirabegron 25 mg daily    Type 2 diabetes mellitus with diabetic polyneuropathy, with long-term current use of insulin ([facility])- (present on admission)  Assessment & Plan  Hyperglycemic in the ER, mild DKA  Insulin received in the ER  Continue LA insulin and ISS  Adjust prn    Parkinson's disease ([facility])- (present on admission)  Assessment & Plan  Continue outpatient sinemet        VTE prophylaxis: enoxaparin ppx

## 2024-05-01 NOTE — ASSESSMENT & PLAN NOTE
From fall this AM  Initial Hip xray negative for obvious fracture  Some persistent pain noted on exam  If persists, low threshold get cT hip to r/o occult fracture  PT/OT eval

## 2024-05-01 NOTE — PROGRESS NOTES
~Med rec updated complete per MAR ([facility])

## 2024-05-01 NOTE — ASSESSMENT & PLAN NOTE
EKG at admission reported aflutter; I personally reviewed EKG, my personal interpretation is  sinus rhythm  Repeated EKG today showed a sinus rhythm    No history of atrial flutter or A-fib    HR in good range

## 2024-05-01 NOTE — ED NOTES
Bedside report to Arabella MCRAE. Pt on HR, oxygen and BP monitor. Pt on 1L NC oxygen. Pt on bed alarm bed locked in lowest position side rails up x 2 call light within reach.

## 2024-05-01 NOTE — ED PROVIDER NOTES
"  CHIEF COMPLAINT  Right hip evaluation  Near fall    LIMITATION TO HISTORY   Slight limitation due to patient's short-term memory loss secondary to her Parkinson's    HPI    Mc Krishnamurthy is a 78 y.o. male who has a history of Parkinson's.  States that he has been having episodes where \"body goes out\" over the last 2 weeks  Also short-term memory loss  Comes in today after difficulty getting out of a chair.  Patient states differently.      Nurses note below    BIBA from [facility]. Pt states he was sitting in his recliner, struggled to stand up and slid out of the chair to the ground. Pt denies head strike, no thinners. Pt reports 3/10 right hip pain, no deformity, rotation or shortening noted.     Patient tells me he just gave out.  He states that they were changing his room.  Said he was walking from the bathroom to the bedroom when he felt like his about to collapse.  He basically was not fall down but not his head.  He was helped by staff.  Patient then sat in his chair.  At this point patient complains of some right side pain.  Right knee pain but at this point patient also denies any right hip pain at this time.    Is been going on for the last several weeks.  None    The chart from July 26, 2023 has a history of falls unsteady gait.  In addition patient has history of hallucinations at time.  In addition the patient has a history of Parkinson's.  In addition the patient has a history of urinary incontinence.    OUTSIDE HISTORIAN(S):  None    EXTERNAL RECORDS REVIEWED  Reviewed the records.  Patient not had not been here in quite some time.    REVIEW OF SYSTEMS  No fevers or chills    PAST MEDICAL HISTORY  Past Medical History:   Diagnosis Date    Acute respiratory failure with hypoxia (Formerly [facility]) 11/15/2022    Cognitive impairment     Morbid obesity with body mass index (BMI) of 40.0 to 44.9 in adult (Formerly [facility]) 07/20/2022    Parkinson disease (Formerly [facility])        FAMILY HISTORY  History reviewed. No pertinent " family history.    SOCIAL HISTORY  Social History     Tobacco Use    Smoking status: Former     Types: Cigarettes   Vaping Use    Vaping Use: Never used   Substance Use Topics    Alcohol use: Not Currently    Drug use: Not Currently     Social History     Substance and Sexual Activity   Drug Use Not Currently       SURGICAL HISTORY  History reviewed. No pertinent surgical history.    CURRENT MEDICATIONS  No current facility-administered medications for this encounter.    Current Outpatient Medications:     insulin glargine (LANTUS SOLOSTAR) 100 UNIT/ML Solution Pen-injector injection, Inject 40 Units under the skin every evening., Disp: 15 mL, Rfl: 0    Cholecalciferol (VITAMIN D3) 125 MCG (5000 UT) Cap, Take 1 Capsule by mouth every day., Disp: 30 Capsule, Rfl: 0    cetirizine (ZYRTEC) 10 MG Tab, Take 1 Tablet by mouth every day., Disp: 30 Tablet, Rfl: 0    melatonin 3 MG Tab, Take 1 Tablet by mouth at bedtime., Disp: 30 Tablet, Rfl: 0    tamsulosin (FLOMAX) 0.4 MG capsule, Take 1 Capsule by mouth 1/2 hour after breakfast., Disp: 30 Capsule, Rfl: 0    Multiple Vitamin (MULTIVITAMIN) Tab, TAKE 1 TABLET BY MOUTH ONCE DAILY, Disp: 31 Tablet, Rfl: 0    Simethicone 250 MG Cap, Take 1 Capsule by mouth 3 times a day., Disp: 90 Capsule, Rfl: 2    famotidine (PEPCID) 20 MG Tab, Take 1 Tablet by mouth every morning before breakfast., Disp: 30 Tablet, Rfl: 11    azithromycin (ZITHROMAX) 250 MG Tab, Take 1 Tablet by mouth see administration instructions., Disp: 6 Tablet, Rfl: 0    docusate sodium (COLACE) 100 MG Cap, Take 1 Capsule by mouth 2 times a day., Disp: 60 Capsule, Rfl: 5    insulin lispro 100 UNIT/ML SC SOPN injection PEN, Inject 11 units SQ  BID, Disp: 5 Each, Rfl: 3    ULTICARE MICRO PEN NEEDLES, USE AS DIRECTED, Disp: 100 Each, Rfl: 11    Continuous Blood Gluc  (FREESTYLE BEAN 2 READER) Device, Apply 1 Each topically continuous., Disp: , Rfl:     Probiotic Product (PROBIOTIC DAILY) Cap, Take 1 Capsule by  mouth every day., Disp: 30 Capsule, Rfl: 5    Continuous Blood Gluc Sensor (FREESTYLE BEAN 2 SENSOR) Misc, 1 Each every 14 days., Disp: 2 Each, Rfl: 11    acetaminophen (ACETAMINOPHEN EXTRA STRENGTH) 500 MG Tab, Take 2 Tablets by mouth every 8 hours as needed (For back, joint,  or soft tissue pain, or headache.). Max dose 3gm/day from all sources. (Patient taking differently: Take 1,000 mg by mouth every 6 hours as needed (For back, joint,  or soft tissue pain, or headache.). Max dose 3gm/day from all sources.), Disp: 180 Tablet, Rfl: 10    memantine (NAMENDA) 10 MG Tab, Take 10 mg by mouth 2 times a day., Disp: , Rfl:     FLUoxetine (PROZAC) 10 MG Cap, TAKE 1 CAPSULE BY MOUTH TWICE DAILY FOR DEPRESSION, Disp: 60 Capsule, Rfl: 10    mirabegron ER (MYRBETRIQ) 25 MG TABLET SR 24 HR, Take 25 mg by mouth every day., Disp: , Rfl:     nystatin (MYCOSTATIN) 414188 UNIT/GM Cream topical cream, Apply 1 Application. topically 2 times a day., Disp: , Rfl:     polyethylene glycol 3350 (MIRALAX) 17 GM/SCOOP Powder, TAKE 17GM MIXED IN 8OZ WATER AND DRINK BY MOUTH ONCE DAILY AS NEEDED FOR CONSTIPATION, Disp: 510 g, Rfl: 10    losartan (COZAAR) 50 MG Tab, Take 50 mg by mouth every day., Disp: , Rfl:     ROBAFEN DM  MG/20ML Liquid, TAKE 10MLS BY MOUTH THREE TIMES A DAY  AS NEEDED FOR COUGH OR CONGESTION FOR UP TO 30 DAYS, Disp: 237 mL, Rfl: 10    metFORMIN (GLUCOPHAGE) 500 MG Tab, Take 1,000 mg by mouth 2 times a day., Disp: , Rfl:     donepezil (ARICEPT) 10 MG tablet, TAKE 1 TABLET BY MOUTH TWICE DAILY (Patient taking differently: Take 10 mg by mouth every evening.), Disp: 60 Tablet, Rfl: 10    ANTACID ULTRA STRENGTH 1000 MG Chew Tab, TAKE 2 TABLETS (2000MG) BY MOUTH THREE TIMES A DAY  AS NEEDED FOR DYSPEPSIA (Patient taking differently: Chew 2 Tablets every 8 hours as needed (DYSPEPSIA).), Disp: 180 Tablet, Rfl: 10    triamcinolone acetonide (KENALOG) 0.1 % Cream, Apply 1 Application topically every 12 hours as needed (FOR  "RASH ON NECK AND BUTTOCKS)., Disp: , Rfl:     glucose 4 GM chewable tablet, Chew 1 Tablet as needed for Low Blood Sugar., Disp: 30 Tablet, Rfl: 11    magnesium hydroxide (MILK OF MAGNESIA) 400 MG/5ML Suspension, Take 30 mL by mouth every 12 hours as needed., Disp: , Rfl:     ketoconazole (NIZORAL) 2 % shampoo, Apply  topically every 48 hours. APPLY TO SCALP, FACE AND GROIN, LET SIT FOR 3 TO 5 MINUTES THEN RINSE. ONCE IMPROVED, CONTINUE ONCE WEEKLY AS MAINTENANCE, Disp: , Rfl:     lactobacillus rhamnosus (CULTURELLE) Cap capsule, Take 1 Capsule by mouth every morning with breakfast., Disp: , Rfl:     ROPINIRole (REQUIP) 0.25 MG Tab, Take 2 Tablets by mouth 3 times a day., Disp: , Rfl:     Sharps Container (BD SHARPS  XL) Misc, 1 Container every 30 days., Disp: 1 Each, Rfl: 5    pravastatin (PRAVACHOL) 20 MG Tab, Take 1 Tablet by mouth every day., Disp: 30 Tablet, Rfl: 5    carboxymethylcellulose (REFRESH TEARS) 0.5 % Solution, Administer 1 Drop into both eyes in the morning, at noon, and at bedtime. (Patient taking differently: Administer 1 Drop into both eyes 2 (two) times a day.), Disp: 15 mL, Rfl: 5    albuterol 108 (90 Base) MCG/ACT Aero Soln inhalation aerosol, Inhale 2 Puffs every four hours as needed for Shortness of Breath., Disp: , Rfl:     carbidopa-levodopa SR (SINEMET CR)  MG per tablet, Take 1 Tablet by mouth at bedtime., Disp: , Rfl:     Nutritional Supplements (GLUCERNA ADVANCE SHAKE PO), Take 1 Can by mouth 1 time a day as needed (FOR MISSED MEALS OR SNACKS)., Disp: , Rfl:     Carbidopa-Levodopa ER (RYTARY) 48. MG Cap CR, Take 3 Capsules by mouth 4 times a day. Administer 6am, 10am, 2pm, and 6pm, Disp: , Rfl:     ALLERGIES  No Known Allergies    PHYSICAL EXAM  VITAL SIGNS: /66   Pulse 97   Temp 36.9 °C (98.4 °F) (Temporal)   Resp 15   Ht 1.778 m (5' 10\")   Wt 107 kg (235 lb)   SpO2 89%   BMI 33.72 kg/m²   Reviewed and noted.  Patient was initially hypoxic 89%.  " Requiring oxygen  Constitutional: Well developed, Well nourished, noted..  HENT: Normocephalic, atraumatic, bilateral external ears normal, No intraoral erythema, edema, exudate  Eyes: PERRLA, conjunctiva pink, no scleral icterus.   Cardiovascular: Regular rate and rhythm. No murmurs, rubs or gallops.  No dependent edema or calf tenderness  Respiratory: Rhonchi on the right.  He has some increased breath sounds on the left with some wheezes.  Abdominal:  Abdomen soft, non-tender, non distended. No rebound, or guarding.    Skin: No erythema, no rash. No wounds or bruising.  Genitourinary: No costovertebral angle tenderness.   Musculoskeletal: no deformities.   Neurologic: Alert, no facial droop noted. All extra ocular muscles intact. Moves all extremities with out weakness noted  Psychiatric: Affect normal, Judgment normal, Mood normal.         MEDICAL DECISION MAKING:  PROBLEMS EVALUATED THIS VISIT:  Hypoxia.  Patient has a history of some intermittent oxygen use.  Here he is hypoxic noted be hypoxic by EMS.  Placed on oxygen.  Lung exam*rhonchorous signs on the right side.  Poor airway movement.  Weakness.  Patient was walking when he felt weak.  This is different than what was noted from the nurses note that the patient slumped in the chair regardless the patient at this point had some sort of weak effort.  On exam the patient is noted be slightly hypoxic.  He is not tachycardic or febrile at this time.  Exam just showed some pulmonary issues.  He did move all extremities but noted to be weak overall  Right hip discomfort.  The patient was told to have some slight shortening of his right hip and some discomfort after he just simply slid in his chair.  Exam he can range his hips and his knees.  He has no obvious deformity.  Neurovascular intact distally.         PLAN:  X-ray pelvis hip  X-ray chest  Lab work  IV fluids      RISK:  High risk and will need to be admitted to the hospital        RESULTS    LABS Ordered  and Reviewed by Me:  Results for orders placed or performed during the hospital encounter of 05/01/24   CBC w/ Differential   Result Value Ref Range    WBC 14.3 (H) 4.8 - 10.8 K/uL    RBC 4.84 4.70 - 6.10 M/uL    Hemoglobin 15.4 14.0 - 18.0 g/dL    Hematocrit 44.4 42.0 - 52.0 %    MCV 91.7 81.4 - 97.8 fL    MCH 31.8 27.0 - 33.0 pg    MCHC 34.7 32.3 - 36.5 g/dL    RDW 43.3 35.9 - 50.0 fL    Platelet Count 173 164 - 446 K/uL    MPV 10.8 9.0 - 12.9 fL    Neutrophils-Polys 90.50 (H) 44.00 - 72.00 %    Lymphocytes 4.30 (L) 22.00 - 41.00 %    Monocytes 4.60 0.00 - 13.40 %    Eosinophils 0.00 0.00 - 6.90 %    Basophils 0.20 0.00 - 1.80 %    Immature Granulocytes 0.40 0.00 - 0.90 %    Nucleated RBC 0.00 0.00 - 0.20 /100 WBC    Neutrophils (Absolute) 12.92 (H) 1.82 - 7.42 K/uL    Lymphs (Absolute) 0.62 (L) 1.00 - 4.80 K/uL    Monos (Absolute) 0.65 0.00 - 0.85 K/uL    Eos (Absolute) 0.00 0.00 - 0.51 K/uL    Baso (Absolute) 0.03 0.00 - 0.12 K/uL    Immature Granulocytes (abs) 0.06 0.00 - 0.11 K/uL    NRBC (Absolute) 0.00 K/uL   Complete Metabolic Panel (CMP)   Result Value Ref Range    Sodium 135 135 - 145 mmol/L    Potassium 4.3 3.6 - 5.5 mmol/L    Chloride 99 96 - 112 mmol/L    Co2 19 (L) 20 - 33 mmol/L    Anion Gap 17.0 (H) 7.0 - 16.0    Glucose 320 (H) 65 - 99 mg/dL    Bun 20 8 - 22 mg/dL    Creatinine 0.91 0.50 - 1.40 mg/dL    Calcium 8.8 8.5 - 10.5 mg/dL    Correct Calcium 9.1 8.5 - 10.5 mg/dL    AST(SGOT) 9 (L) 12 - 45 U/L    ALT(SGPT) <5 2 - 50 U/L    Alkaline Phosphatase 91 30 - 99 U/L    Total Bilirubin 0.6 0.1 - 1.5 mg/dL    Albumin 3.6 3.2 - 4.9 g/dL    Total Protein 6.6 6.0 - 8.2 g/dL    Globulin 3.0 1.9 - 3.5 g/dL    A-G Ratio 1.2 g/dL   Urinalysis    Specimen: Urine   Result Value Ref Range    Color Yellow     Character Clear     Specific Gravity 1.041 <1.035    Ph 5.0 5.0 - 8.0    Glucose >=1000 (A) Negative mg/dL    Ketones 40 (A) Negative mg/dL    Protein 30 (A) Negative mg/dL    Bilirubin Negative Negative     Urobilinogen, Urine 0.2 Negative    Nitrite Negative Negative    Leukocyte Esterase Negative Negative    Occult Blood Negative Negative    Micro Urine Req Microscopic    Magnesium   Result Value Ref Range    Magnesium 1.6 1.5 - 2.5 mg/dL   Troponin - STAT Once   Result Value Ref Range    Troponin T 17 6 - 19 ng/L   ESTIMATED GFR   Result Value Ref Range    GFR (CKD-EPI) 87 >60 mL/min/1.73 m 2   URINE MICROSCOPIC (W/UA)   Result Value Ref Range    WBC 0-2 (A) /hpf    RBC 0-2 (A) /hpf    Bacteria Negative None /hpf    Epithelial Cells Negative /hpf    Mucous Threads Many /hpf    Hyaline Cast 3-5 (A) /lpf   EKG - STAT   Result Value Ref Range    Report       [facility] Emergency Dept.    Test Date:  2024  Pt Name:    BARBARA WALKER                 Department: ER  MRN:        [id number]                      Room:       [facility]  Gender:     Male                         Technician: [id number]  :        1945                   Requested By:CAMERON FRANCIS  Order #:    [id number]                    Reading MD: Cameron FRANCIS MD    Measurements  Intervals                                Axis  Rate:       97                           P:          0  LA:         0                            QRS:        50  QRSD:       98                           T:          65  QT:         363  QTc:        461    Interpretive Statements  Atrial flutter  Rate of 97  Intervals not normal QRS, borderline QTc  Axis normal  Ischemia, no ST segment elevation depressions.  Atrial flutter noted and what  appears to be new from previous EKG of 11/15/2022    Electronically Signed On 2024 10:57:22 PDT by CHEN Hugo     POCT glucose device results   Result Value Ref Range    POC Glucose, Blood 296 (H) 65 - 99 mg/dL   POCT glucose device results   Result Value Ref Range    POC Glucose, Blood 238 (H) 65 - 99 mg/dL         RADIOLOGY    I have independently interpreted the diagnostic imaging  associated with this visit and am waiting the final reading from the radiologist.   My preliminary interpretation is a follows: 8:29 AM  No fracture or dislocation noted    Radiologist interpretation:   DX-CHEST-PORTABLE (1 VIEW)   Final Result      Bilateral basilar atelectasis and/or consolidation. Underlying infection is possible.      DX-HIP-COMPLETE - UNILATERAL 2+ RIGHT   Final Result         1.  No radiographic evidence of acute traumatic injury.            ED COURSE:    ED Observation Status? No   No noted need for observation for developing issue    INTERVENTIONS BY ME:  Medications   ampicillin/sulbactam (Unasyn) 3 g in  mL IVPB (has no administration in time range)   azithromycin (Zithromax) tablet 500 mg (has no administration in time range)   acetaminophen (Tylenol) tablet 650 mg (650 mg Oral Given 5/1/24 0656)   lactated ringers (LR) bolus (0 mL Intravenous Stopped 5/1/24 1024)   insulin regular (HumuLIN R,NovoLIN R) injection (5 Units Intravenous Given 5/1/24 0917)       Response on recheck:  Patient slightly improved.  Glucose seems to be improved as well.    CONSULTANTS/OTHER GROUPS CONTACTED    Hospitalist for admission    FINAL DISPO PLAN   Was given antibiotics for suspected community-acquired pneumonia  Patient at this point exhibit no signs of sepsis  The patient will be admitted for further workup treatment  In addition patient received IV fluids and insulin for elevated glucose    CONDITION: Guarded.     FINAL IMPRESSION  1. Pneumonia due to infectious organism, unspecified laterality, unspecified part of lung    2. Weakness    3. Leukocytosis, unspecified type    4. Hypoxia    5. Hyperglycemia

## 2024-05-01 NOTE — ASSESSMENT & PLAN NOTE
Hyperglycemic in the ER, mild DKA  Insulin received in the ER  Home dose Lantus 40 units  Continue LA insulin and ISS  A1c 9  Lantus has been increased to 48 units    5/5 BG high, I increase the Lantus to 52 units, continue SSI  5/6 BG improving, continue Lantus 52 units and SSI  Hypoglycemia protocol  5/7/24-switch to glargine 30 units twice daily

## 2024-05-01 NOTE — ASSESSMENT & PLAN NOTE
Check Mrsa nares  Sputum and blood cultures  SLP eval for swallowing issues, especially in light of his long-standing Parkinson's    IV Unasyn transitioned to Augmentin  5/6 patient completed a course of antibiotics

## 2024-05-01 NOTE — ED NOTES
Pt taken to Xray. Pt placed on 3L NC for transport, pt room air sat 87%, 3L NC oxygen brought patient to 90% oxygen saturation.

## 2024-05-01 NOTE — PROGRESS NOTES
4 Eyes Skin Assessment Completed by THOR Quigley and THOR Gonzalez.    Head WDL  Ears WDL  Nose WDL  Mouth WDL  Neck WDL  Breast/Chest WDL  Shoulder Blades WDL  Spine WDL  (R) Arm/Elbow/Hand WDL  (L) Arm/Elbow/Hand WDL  Abdomen WDL  Groin WDL  Scrotum/Coccyx/Buttocks Redness and Blanching  (R) Leg WDL  (L) Leg WDL  (R) Heel/Foot/Toe WDL  (L) Heel/Foot/Toe WDL    Interventions In Place Gray Ear Foams, Low Air Loss Mattress, and Barrier Cream    Possible Skin Injury Yes    Pictures Uploaded Into Epic Yes  Wound Consult Placed Yes  RN Wound Prevention Protocol Ordered Yes

## 2024-05-01 NOTE — THERAPY
"Speech Language Pathology   Clinical Swallow Evaluation     Patient Name: Mc Krishnamurthy  AGE:  78 y.o., SEX:  male  Medical Record #: [id number]  Date of Service: 5/1/2024      History of Present Illness  78 y.o. male with past medical history of Parkinson's disease, type 2 diabetes mellitus, COPD and hypertension. Presented on 5/1 from assisted living facility with right hip pain s/p fall, along with worsening SOB and productive cough with greenish sputum. Found to be hypoxic on room air at 87% in the ED.     CMHx: Acute respiratory failure with hypoxia, community acquired pneumonia, atrial flutter    Per EMR, pt receiving speech therapy for \"voice exercises\" and \"checking swallowing\" in 2022; however, no note from SLP in records.     CXR:   Bilateral basilar atelectasis and/or consolidation. Underlying infection is possible.      General Information:  Vitals  O2 (LPM): 1  O2 Delivery Device: Nasal Cannula  Level of Consciousness: Awake  Patient Behaviors: Lethargic, Flat Affect  Orientation: Self  Follows Directives: Yes - simple commands only      Prior Living Situation & Level of Function:  Housing / Facility: Assisted Living Residence       Oral Mechanism Evaluation:  Dentition: Fair, Natural dentition, Some missing dentition   Facial Symmetry: Equal  Facial Sensation: Equal     Labial Observations: WFL   Lingual Observations: Midline         Laryngeal Function:  Secretion Management: Adequate  Voice Quality: Breathy transient  Cough: Perceptually weak       Subjective  RN cleared patient for CSE. Pt asleep upon arrival, roused to verbal cues. Pt drowsy throughout session. Difficult to obtain thorough history as pt frequently losing train of thought or closing eyes during interview.       Assessment  Current Method of Nutrition: Oral diet (Regular solids/thin liquids)  Positioning: Kebede's (60-90 degrees)  Bolus Administration: SLP  O2 (LPM): 1 O2 Delivery Device: Nasal Cannula  Factor(s) Affecting Performance: " "Impaired endurance  Tracheostomy : No       Swallowing Trials:  Swallowing Trials  Thin Liquid (TN0): WFL  Pureed (PU4): WFL  Regular (RG7): WFL      Comments: Patient requested feeding assistance d/t tremulous state. Exhibited adequate oral bolus acceptance/containment. Complete AP transfer without notable oral bolus residue. Functional mastication of solids. Single throat clear following thin liquid trial; otherwise on overt s/sx of airway invasion throughout oral intake. Vocal quality remained clear throughout PO. Single swallow completed per bolus. Occasional belching appreciated throughout trials. Provided education regarding general aspiration precautions as well as signs of aspiration.       Clinical Impressions  Patient presents without overt s/sx of aspiration this date. Recommend continuation of oral diet with 1:1 supervision and feeding assistance. Please hold PO and contact SLP with any concerns. As pt with an elevated risk for oropharyngeal dysphagia in setting of acute respiratory failure and hx of Parkinson's disease and COPD, SLP to follow to ensure diet tolerance and determine need for a diagnostic swallow study pending clinical progress.       Recommendations  Diet Consistency: Regular solids and thin liquids  Instrumentation: Instrumental swallow study pending clinical progress  Medication: As tolerated  Supervision: 1:1 feeding with constant supervision  Positioning: Fully upright and midline during oral intake  Risk Management : Small bites/sips, Slow rate of intake, Physical mobility, as tolerated  Oral Care: Q4h         SLP Treatment Plan  Treatment Plan: Dysphagia Treatment  SLP Frequency: 3x Per Week  Estimated Duration: Until Therapy Goals Met      Anticipated Discharge Needs  Discharge Recommendations:  (Pending clinical progress)   Therapy Recommendations Upon DC: Dysphagia Training, Patient / Family / Caregiver Education        Patient / Family Goals  Patient / Family Goal #1: \"I just " "need to eat vegetables\"  Short Term Goals  Short Term Goal # 1: Pt will consume a diet of regular solids and thin liquids without overt s/sx of aspiration or decline in respiratory status      Richard Rodríguez, SLP   "

## 2024-05-01 NOTE — ED NOTES
Pt back from xray, on bed alarm, on cardiac, BP, HR, and oxygen monitor, oxygen decreased from 3L NC to 2L NC. Pt GCS 15.

## 2024-05-01 NOTE — ASSESSMENT & PLAN NOTE
Presumed 2/2 CAP  See below  O2 supplementation    Elevated proBNP 1260   Echo unremarkable, EF 60%  Received 1 dose of IV Lasix   Continue to wean down oxygen  Continue antibioticS    5/7-at his baseline.

## 2024-05-01 NOTE — ED TRIAGE NOTES
"  Chief Complaint   Patient presents with    T-5000 GLF     BIBA from [facility]. Pt states he was sitting in his recliner, struggled to stand up and slid out of the chair to the ground. Pt denies head strike, no thinners. Pt reports 3/10 right hip pain, no deformity, rotation or shortening noted.     /66   Pulse 97   Temp 36.9 °C (98.4 °F) (Temporal)   Resp 15   Ht 1.778 m (5' 10\")   Wt 107 kg (235 lb)   SpO2 89%   BMI 33.72 kg/m²     "

## 2024-05-02 PROBLEM — R30.0 DYSURIA: Status: ACTIVE | Noted: 2024-05-02

## 2024-05-02 PROBLEM — R19.7 DIARRHEA: Status: ACTIVE | Noted: 2024-05-02

## 2024-05-02 LAB
ALBUMIN SERPL BCP-MCNC: 3.5 G/DL (ref 3.2–4.9)
ALBUMIN/GLOB SERPL: 1.3 G/DL
ALP SERPL-CCNC: 68 U/L (ref 30–99)
ALT SERPL-CCNC: <5 U/L (ref 2–50)
ANION GAP SERPL CALC-SCNC: 12 MMOL/L (ref 7–16)
AST SERPL-CCNC: 12 U/L (ref 12–45)
BASOPHILS # BLD AUTO: 0.2 % (ref 0–1.8)
BASOPHILS # BLD: 0.03 K/UL (ref 0–0.12)
BILIRUB SERPL-MCNC: 0.6 MG/DL (ref 0.1–1.5)
BUN SERPL-MCNC: 12 MG/DL (ref 8–22)
CALCIUM ALBUM COR SERPL-MCNC: 8.7 MG/DL (ref 8.5–10.5)
CALCIUM SERPL-MCNC: 8.3 MG/DL (ref 8.5–10.5)
CHLORIDE SERPL-SCNC: 101 MMOL/L (ref 96–112)
CO2 SERPL-SCNC: 25 MMOL/L (ref 20–33)
CREAT SERPL-MCNC: 0.77 MG/DL (ref 0.5–1.4)
EKG IMPRESSION: NORMAL
EOSINOPHIL # BLD AUTO: 0 K/UL (ref 0–0.51)
EOSINOPHIL NFR BLD: 0 % (ref 0–6.9)
ERYTHROCYTE [DISTWIDTH] IN BLOOD BY AUTOMATED COUNT: 43.8 FL (ref 35.9–50)
GFR SERPLBLD CREATININE-BSD FMLA CKD-EPI: 92 ML/MIN/1.73 M 2
GLOBULIN SER CALC-MCNC: 2.7 G/DL (ref 1.9–3.5)
GLUCOSE BLD STRIP.AUTO-MCNC: 258 MG/DL (ref 65–99)
GLUCOSE BLD STRIP.AUTO-MCNC: 267 MG/DL (ref 65–99)
GLUCOSE BLD STRIP.AUTO-MCNC: 274 MG/DL (ref 65–99)
GLUCOSE BLD STRIP.AUTO-MCNC: 353 MG/DL (ref 65–99)
GLUCOSE SERPL-MCNC: 206 MG/DL (ref 65–99)
HCT VFR BLD AUTO: 41.8 % (ref 42–52)
HGB BLD-MCNC: 14.6 G/DL (ref 14–18)
IMM GRANULOCYTES # BLD AUTO: 0.05 K/UL (ref 0–0.11)
IMM GRANULOCYTES NFR BLD AUTO: 0.4 % (ref 0–0.9)
LYMPHOCYTES # BLD AUTO: 1.05 K/UL (ref 1–4.8)
LYMPHOCYTES NFR BLD: 8.4 % (ref 22–41)
MCH RBC QN AUTO: 31.6 PG (ref 27–33)
MCHC RBC AUTO-ENTMCNC: 34.9 G/DL (ref 32.3–36.5)
MCV RBC AUTO: 90.5 FL (ref 81.4–97.8)
MONOCYTES # BLD AUTO: 0.93 K/UL (ref 0–0.85)
MONOCYTES NFR BLD AUTO: 7.4 % (ref 0–13.4)
NEUTROPHILS # BLD AUTO: 10.49 K/UL (ref 1.82–7.42)
NEUTROPHILS NFR BLD: 83.6 % (ref 44–72)
NRBC # BLD AUTO: 0 K/UL
NRBC BLD-RTO: 0 /100 WBC (ref 0–0.2)
NT-PROBNP SERPL IA-MCNC: 1260 PG/ML (ref 0–125)
PLATELET # BLD AUTO: 165 K/UL (ref 164–446)
PMV BLD AUTO: 10.6 FL (ref 9–12.9)
POTASSIUM SERPL-SCNC: 3.5 MMOL/L (ref 3.6–5.5)
PROCALCITONIN SERPL-MCNC: 0.22 NG/ML
PROT SERPL-MCNC: 6.2 G/DL (ref 6–8.2)
RBC # BLD AUTO: 4.62 M/UL (ref 4.7–6.1)
SODIUM SERPL-SCNC: 138 MMOL/L (ref 135–145)
WBC # BLD AUTO: 12.6 K/UL (ref 4.8–10.8)

## 2024-05-02 PROCEDURE — 99233 SBSQ HOSP IP/OBS HIGH 50: CPT | Performed by: STUDENT IN AN ORGANIZED HEALTH CARE EDUCATION/TRAINING PROGRAM

## 2024-05-02 PROCEDURE — 8E0ZXY6 ISOLATION: ICD-10-PCS | Performed by: STUDENT IN AN ORGANIZED HEALTH CARE EDUCATION/TRAINING PROGRAM

## 2024-05-02 PROCEDURE — 93010 ELECTROCARDIOGRAM REPORT: CPT | Performed by: INTERNAL MEDICINE

## 2024-05-02 RX ORDER — AMOXICILLIN AND CLAVULANATE POTASSIUM 875; 125 MG/1; MG/1
1 TABLET, FILM COATED ORAL EVERY 12 HOURS
Status: COMPLETED | OUTPATIENT
Start: 2024-05-03 | End: 2024-05-05

## 2024-05-02 RX ORDER — POTASSIUM CHLORIDE 20 MEQ/1
40 TABLET, EXTENDED RELEASE ORAL ONCE
Status: COMPLETED | OUTPATIENT
Start: 2024-05-02 | End: 2024-05-02

## 2024-05-02 RX ORDER — ACETAMINOPHEN 325 MG/1
650 TABLET ORAL EVERY 4 HOURS PRN
Status: DISCONTINUED | OUTPATIENT
Start: 2024-05-02 | End: 2024-05-09 | Stop reason: HOSPADM

## 2024-05-02 RX ADMIN — AMPICILLIN AND SULBACTAM 3 G: 1; 2 INJECTION, POWDER, FOR SOLUTION INTRAMUSCULAR; INTRAVENOUS at 17:54

## 2024-05-02 RX ADMIN — ROPINIROLE HYDROCHLORIDE 0.5 MG: 0.5 TABLET, FILM COATED ORAL at 16:00

## 2024-05-02 RX ADMIN — INSULIN HUMAN 12 UNITS: 100 INJECTION, SOLUTION PARENTERAL at 07:49

## 2024-05-02 RX ADMIN — PRAVASTATIN SODIUM 20 MG: 20 TABLET ORAL at 21:05

## 2024-05-02 RX ADMIN — AMPICILLIN AND SULBACTAM 3 G: 1; 2 INJECTION, POWDER, FOR SOLUTION INTRAMUSCULAR; INTRAVENOUS at 05:33

## 2024-05-02 RX ADMIN — ROPINIROLE HYDROCHLORIDE 0.5 MG: 0.5 TABLET, FILM COATED ORAL at 21:05

## 2024-05-02 RX ADMIN — INSULIN HUMAN 7 UNITS: 100 INJECTION, SOLUTION PARENTERAL at 17:38

## 2024-05-02 RX ADMIN — INSULIN HUMAN 7 UNITS: 100 INJECTION, SOLUTION PARENTERAL at 13:41

## 2024-05-02 RX ADMIN — MEMANTINE HYDROCHLORIDE 10 MG: 10 TABLET ORAL at 17:54

## 2024-05-02 RX ADMIN — CHOLECALCIFEROL TAB 125 MCG (5000 UNIT) 5000 UNITS: 125 TAB at 05:29

## 2024-05-02 RX ADMIN — MEMANTINE HYDROCHLORIDE 10 MG: 10 TABLET ORAL at 05:27

## 2024-05-02 RX ADMIN — CARBIDOPA AND LEVODOPA 1 TABLET: 50; 200 TABLET, EXTENDED RELEASE ORAL at 17:54

## 2024-05-02 RX ADMIN — POTASSIUM CHLORIDE 40 MEQ: 1500 TABLET, EXTENDED RELEASE ORAL at 09:17

## 2024-05-02 RX ADMIN — INSULIN HUMAN 7 UNITS: 100 INJECTION, SOLUTION PARENTERAL at 21:13

## 2024-05-02 RX ADMIN — CARBIDOPA AND LEVODOPA 1 TABLET: 50; 200 TABLET, EXTENDED RELEASE ORAL at 21:05

## 2024-05-02 RX ADMIN — LOSARTAN POTASSIUM 50 MG: 50 TABLET, FILM COATED ORAL at 05:29

## 2024-05-02 RX ADMIN — FAMOTIDINE 20 MG: 20 TABLET, FILM COATED ORAL at 07:36

## 2024-05-02 RX ADMIN — FLUOXETINE HYDROCHLORIDE 10 MG: 10 CAPSULE ORAL at 05:27

## 2024-05-02 RX ADMIN — ROPINIROLE HYDROCHLORIDE 0.5 MG: 0.5 TABLET, FILM COATED ORAL at 07:36

## 2024-05-02 RX ADMIN — AZITHROMYCIN DIHYDRATE 500 MG: 250 TABLET, FILM COATED ORAL at 05:27

## 2024-05-02 RX ADMIN — ACETAMINOPHEN 650 MG: 325 TABLET, FILM COATED ORAL at 21:10

## 2024-05-02 RX ADMIN — DONEPEZIL HYDROCHLORIDE 10 MG: 5 TABLET, FILM COATED ORAL at 21:04

## 2024-05-02 RX ADMIN — AMPICILLIN AND SULBACTAM 3 G: 1; 2 INJECTION, POWDER, FOR SOLUTION INTRAMUSCULAR; INTRAVENOUS at 13:06

## 2024-05-02 RX ADMIN — CARBIDOPA AND LEVODOPA 1 TABLET: 50; 200 TABLET, EXTENDED RELEASE ORAL at 05:29

## 2024-05-02 RX ADMIN — FLUOXETINE HYDROCHLORIDE 10 MG: 10 CAPSULE ORAL at 17:55

## 2024-05-02 RX ADMIN — ENOXAPARIN SODIUM 40 MG: 100 INJECTION SUBCUTANEOUS at 17:54

## 2024-05-02 RX ADMIN — CARBIDOPA AND LEVODOPA 1 TABLET: 50; 200 TABLET, EXTENDED RELEASE ORAL at 16:00

## 2024-05-02 RX ADMIN — CARBIDOPA AND LEVODOPA 1 TABLET: 50; 200 TABLET, EXTENDED RELEASE ORAL at 07:36

## 2024-05-02 RX ADMIN — Medication 3 MG: at 21:04

## 2024-05-02 RX ADMIN — TAMSULOSIN HYDROCHLORIDE 0.4 MG: 0.4 CAPSULE ORAL at 09:55

## 2024-05-02 RX ADMIN — CARBIDOPA AND LEVODOPA 1 TABLET: 50; 200 TABLET, EXTENDED RELEASE ORAL at 13:06

## 2024-05-02 ASSESSMENT — COGNITIVE AND FUNCTIONAL STATUS - GENERAL
SUGGESTED CMS G CODE MODIFIER DAILY ACTIVITY: CL
PERSONAL GROOMING: A LITTLE
HELP NEEDED FOR BATHING: A LOT
DAILY ACTIVITIY SCORE: 12
EATING MEALS: A LITTLE
TOILETING: TOTAL
DRESSING REGULAR UPPER BODY CLOTHING: A LOT
DRESSING REGULAR LOWER BODY CLOTHING: TOTAL

## 2024-05-02 ASSESSMENT — ACTIVITIES OF DAILY LIVING (ADL): TOILETING: REQUIRES ASSIST

## 2024-05-02 ASSESSMENT — PAIN DESCRIPTION - PAIN TYPE: TYPE: CHRONIC PAIN

## 2024-05-02 NOTE — CARE PLAN
The patient is Stable - Low risk of patient condition declining or worsening    Shift Goals  Clinical Goals: Monitor O2 needs, be above 89%  Patient Goals: eat  Family Goals: keily      Patient is not progressing towards the following goals:      Problem: Fall Risk  Goal: Patient will remain free from falls  Description: Target End Date:  Prior to discharge or change in level of care    Document interventions on the Lynda Cook Fall Risk Assessment    1.  Assess for fall risk factors  2.  Implement fall precautions  Outcome: Not Progressing

## 2024-05-02 NOTE — THERAPY
"Occupational Therapy   Initial Evaluation     Patient Name: Mc Krishnamurthy  Age:  78 y.o., Sex:  male  Medical Record #: [id number]  Today's Date: 5/2/2024     Precautions  Precautions: Fall Risk  Comments: hx PD    Assessment  Patient is a 78 y.o. male with a diagnosis of acute respiratory failure with hypoxia after [facility] after [facility] onto his R hip. X-ray negative for fracture, low threshold for CT to r/o occult fx per hospitalist note. Additional factors influencing patient status / progress: PMHx includes Parkinson's disease, type 2 diabetes mellitus, COPD and hypertension. During OT eval, pt presented with impaired balance, activity tolerance, strength, coordination, decreased functional mobility and progressively worsening tremors with fatigue. Pt needed maxA for bed mobility, totalA for LBD and toileting, modA for UBD due to impaired coordination and strength. Pt was able to stand with modA with HHA but unable to effectively weight shift to attempt txfer and had a L lateral lean. Pt is functioning below his PLOF to be able to safely return [facility] at this time. Recommend post-acute placement.       Plan    Occupational Therapy Initial Treatment Plan   Treatment Interventions: Self Care / Activities of Daily Living, Adaptive Equipment, Manual Therapy Techniques, Neuro Re-Education / Balance, Therapeutic Exercises, Therapeutic Activity, Family / Caregiver Training  Treatment Frequency: 3 Times per Week  Duration: Until Therapy Goals Met    DC Equipment Recommendations: (P) Unable to determine at this time  Discharge Recommendations: (P) Recommend post-acute placement for additional occupational therapy services prior to discharge home     Subjective    \"I would do better if I wasn't so bloated.\"     Objective     05/02/24 1340   Initial Contact Note    Initial Contact Note Order Received and Verified, Occupational Therapy Evaluation in Progress with Full Report to Follow.   Prior Living " Situation   Prior Services Intermittent Physical Support for ADL Per Service   Housing / Facility Assisted Living Residence   Bathroom Set up Walk In Shower;Grab Bars;Shower Chair   Equipment Owned 4-Wheel Walker;Tub / Shower Seat;Grab Bar(s) In Tub / Shower;Grab Bar(s) By Toilet   Lives with - Patient's Self Care Capacity Attendant / Paid Care Giver   Comments Pt reports his senior care assists with most ADLs, brings his meals to his room, and manages his medications. Reports he is independent with his mobility with his 4WW for household distances. States he has a son in [place] who assists as well   Prior Level of ADL Function   Self Feeding Independent   Grooming / Hygiene Independent   Bathing Requires Assist   Dressing Requires Assist   Toileting Requires Assist   Prior Level of IADL Function   Medication Management Dependent   Laundry Dependent   Kitchen Mobility Requires Assist   Finances Dependent   Home Management Dependent   Shopping Dependent   Prior Level Of Mobility Independent With Device in Home   Driving / Transportation Relatives / Others Provide Transportation   Occupation (Pre-Hospital Vocational) Retired Due To Age   History of Falls   History of Falls Yes   Date of Last Fall   (reason for admit)   Precautions   Precautions Fall Risk   Comments hx PD   Pain   Intervention Repositioned;Rest   Pain 0 - 10 Group   Location Hip;Abdomen   Location Orientation Right   Pain Rating Scale (NPRS)   (did not quantify)   Cognition    Cognition / Consciousness X   Level of Consciousness Alert   Ability To Follow Commands 1 Step   Attention Impaired   Comments Pleasant and cooperative. Intermittently needs repetition of cues   Active ROM Upper Body   Active ROM Upper Body  X   Comments Bilateral tremors   Strength Upper Body   Upper Body Strength  X   Gross Strength Generalized Weakness, Equal Bilaterally.    Sensation Upper Body   Upper Extremity Sensation  Not Tested   Upper Body Muscle Tone   Upper Body Muscle Tone   WDL   Coordination Upper Body   Coordination X   Comments Grossly impaired by tremors   Balance Assessment   Sitting Balance (Static) Fair -   Sitting Balance (Dynamic) Poor +   Standing Balance (Static) Poor +   Standing Balance (Dynamic) Poor   Weight Shift Sitting Poor   Weight Shift Standing Poor   Comments HHA for STS   Bed Mobility    Supine to Sit Maximal Assist   Sit to Supine Maximal Assist   Scooting Maximal Assist   Rolling Maximal Assist to Rt.;Maximum Assist to Lt.   Comments use of bed features   ADL Assessment   Upper Body Dressing Moderate Assist   Lower Body Dressing Total Assist   Toileting Total Assist   How much help from another person does the patient currently need...   Putting on and taking off regular lower body clothing? 1   Bathing (including washing, rinsing, and drying)? 2   Toileting, which includes using a toilet, bedpan, or urinal? 1   Putting on and taking off regular upper body clothing? 2   Taking care of personal grooming such as brushing teeth? 3   Eating meals? 3   6 Clicks Daily Activity Score 12   Functional Mobility   Sit to Stand Moderate Assist   Bed, Chair, Wheelchair Transfer Unable to Participate   Toilet Transfers Unable to Participate   Mobility EOB, STS x2   Activity Tolerance   Sitting Edge of Bed 15+ min   Standing <1 min   Patient / Family Goals   Patient / Family Goal #1 To get stronger   Short Term Goals   Short Term Goal # 1 Pt will txfer to BSC with Franklin   Short Term Goal # 2 Pt will perform g/h seated EOB with setup   Short Term Goal # 3 Pt will change gown with Franklin   Education Group   Role of Occupational Therapist Patient Response Patient;Acceptance;Explanation;Verbal Demonstration   Occupational Therapy Initial Treatment Plan    Treatment Interventions Self Care / Activities of Daily Living;Adaptive Equipment;Manual Therapy Techniques;Neuro Re-Education / Balance;Therapeutic Exercises;Therapeutic Activity;Family / Caregiver Training   Treatment Frequency 3  Times per Week   Duration Until Therapy Goals Met   Problem List   Problem List Decreased Active Daily Living Skills;Decreased Homemaking Skills;Decreased Upper Extremity Strength Right;Decreased Upper Extremity Strength Left;Decreased Upper Extremity AROM Right;Decreased Upper Extremity AROM Left;Decreased Functional Mobility;Decreased Activity Tolerance;Impaired Posture / Trunk Alignment;Impaired Coordination Right Upper Extremity;Impaired Coordination Left Upper Extremity;Impaired Postural Control / Balance   Anticipated Discharge Equipment and Recommendations   DC Equipment Recommendations Unable to determine at this time   Discharge Recommendations Recommend post-acute placement for additional occupational therapy services prior to discharge home   Interdisciplinary Plan of Care Collaboration   IDT Collaboration with  Nursing   Patient Position at End of Therapy In Bed;Bed Alarm On;Call Light within Reach;Tray Table within Reach;Phone within Reach   Collaboration Comments RN in to assist with bed mobility end of session   Session Information   Date / Session Number  5/2 #1 (1/3, 5/8)

## 2024-05-02 NOTE — CARE PLAN
The patient is Watcher - Medium risk of patient condition declining or worsening    Shift Goals  Clinical Goals: Antibiotic, skin integrity  Patient Goals: eat  Family Goals: keily    Progress made toward(s) clinical / shift goals:  plan of care discussed with the pt; pt verbalizes understanding; IV antibiotic scheduled; pt turned and repositioned q2hrs    Patient is not progressing towards the following goals:      Problem: Skin Integrity  Goal: Skin integrity is maintained or improved  Outcome: Not Progressing

## 2024-05-02 NOTE — PROGRESS NOTES
[facility] Medicine Daily Progress Note    Date of Service  5/2/2024    Chief Complaint  Mc Krishnamurthy is a 78 y.o. male admitted 5/1/2024 with generalized weakness and pneumonia    Hospital Course  Mc Krishnamurthy is a 78 y.o. male who presented 5/1/2024 with Parkinson's disease, type 2 diabetes mellitus, COPD and hypertension who presents to the hospital from his assisted living facility.  Patient states that he is weak and fell from his walker and landed on his right hip and had pain.  He also endorses continued and worsening shortness of breath with productive cough with greenish sputum but no blood.   In the emergency room he was found to be hypoxic on room air to 87% and was placed on oxygen supplementation.  Chest x-ray was concerning for bibasilar pneumonia was placed on empiric IV Unasyn and azithromycin.  His hip x-ray was negative for fracture.  He also was found to be persistently hyperglycemic with very mild DKA and was given multiple rounds of IV insulin in the ER.     Interval Problem Update  I saw and examined the patient at bedside  I discussed the care plan with the son at bedside    Aox4, hands tremor  Reported cough, dysuria, chronic diarrhea, abdominal pain.     On 1L,   Continue Unasyn and azithromycin    I have discussed this patient's plan of care and discharge plan at IDT rounds today with Case Management, Nursing, Nursing leadership, and other members of the IDT team.    Consultants/Specialty      Code Status  DNAR/DNI    Disposition  The patient is not medically cleared for discharge to home or a post-acute facility.      I have placed the appropriate orders for post-discharge needs.    Review of Systems   All 12 systems were reviewed and negative except as mentioned above      Physical Exam  Temp:  [36.6 °C (97.8 °F)-38.3 °C (100.9 °F)] 36.9 °C (98.4 °F)  Pulse:  [] 84  Resp:  [16-20] 16  BP: (120-160)/(67-88) 120/67  SpO2:  [92 %-93 %] 93 %    Physical Exam  Constitutional:        General: He is in acute distress.      Appearance: He is ill-appearing.   HENT:      Head: Normocephalic.      Mouth/Throat:      Mouth: Mucous membranes are moist.   Eyes:      Extraocular Movements: Extraocular movements intact.      Conjunctiva/sclera: Conjunctivae normal.   Cardiovascular:      Rate and Rhythm: Normal rate and regular rhythm.      Pulses: Normal pulses.      Heart sounds: Normal heart sounds.   Pulmonary:      Effort: Pulmonary effort is normal.      Breath sounds: Rales present.   Abdominal:      General: Bowel sounds are normal.      Palpations: Abdomen is soft.      Tenderness: There is no abdominal tenderness. There is no guarding.   Musculoskeletal:         General: No swelling or tenderness.      Cervical back: Normal range of motion and neck supple.   Skin:     General: Skin is warm.   Neurological:      Mental Status: He is alert and oriented to person, place, and time. Mental status is at baseline.   Psychiatric:         Mood and Affect: Mood normal.         Fluids    Intake/Output Summary (Last 24 hours) at 5/2/2024 1334  Last data filed at 5/2/2024 1027  Gross per 24 hour   Intake 780 ml   Output 800 ml   Net -20 ml       Laboratory  Recent Labs     05/01/24  0613 05/02/24  0146   WBC 14.3* 12.6*   RBC 4.84 4.62*   HEMOGLOBIN 15.4 14.6   HEMATOCRIT 44.4 41.8*   MCV 91.7 90.5   MCH 31.8 31.6   MCHC 34.7 34.9   RDW 43.3 43.8   PLATELETCT 173 165   MPV 10.8 10.6     Recent Labs     05/01/24  0613 05/02/24  0146   SODIUM 135 138   POTASSIUM 4.3 3.5*   CHLORIDE 99 101   CO2 19* 25   GLUCOSE 320* 206*   BUN 20 12   CREATININE 0.91 0.77   CALCIUM 8.8 8.3*                   Imaging  DX-CHEST-PORTABLE (1 VIEW)   Final Result      Bilateral basilar atelectasis and/or consolidation. Underlying infection is possible.      DX-HIP-COMPLETE - UNILATERAL 2+ RIGHT   Final Result         1.  No radiographic evidence of acute traumatic injury.      EC-ECHOCARDIOGRAM COMPLETE W/ CONT    (Results Pending)         Assessment/Plan  * Acute respiratory failure with hypoxia (HCC)- (present on admission)  Assessment & Plan  Presumed 2/2 CAP  See below  O2 supplementation    Echo pending    Diarrhea  Assessment & Plan  Reported of chronic diarrhea, some abdominal pain  I ordered a c. diff    Dysuria  Assessment & Plan  Reported dysuria  UA WBC 0-2, negative for bacteria  I ordered a urine culture  Patient is on Unasyn for pneumonia, continue    Right hip pain- (present on admission)  Assessment & Plan  From fall this AM  Initial Hip xray negative for obvious fracture  Some persistent pain noted on exam  If persists, low threshold get cT hip to r/o occult fracture  PT/OT eval    Atrial flutter (HCC)- (present on admission)  Assessment & Plan  EKG at admission, reported aflutter; I personally reviewed EKG, my personal interpretation is  sinus rhythm  Repeated EKG today showed a sinus rhythm    No history of atrial flutter or A-fib    HR in good range    CAP (community acquired pneumonia)- (present on admission)  Assessment & Plan  Check Mrsa nares  Sputum and blood cultures  SLP eval for swallowing issues, especially in light of his long-standing Parkinson's    Continue Unasyn and azithromycin        Recurrent major depressive disorder, in partial remission (HCC)- (present on admission)  Assessment & Plan  Prozac 10 mg daily    Dyslipidemia associated with type 2 diabetes mellitus (HCC)- (present on admission)  Assessment & Plan  Continue pravastatin    BMI 33.0-33.9,adult- (present on admission)  Assessment & Plan  Outpatient nutritional counseling    COPD (chronic obstructive pulmonary disease) (HCC)- (present on admission)  Assessment & Plan  No clear exacerbation at this time  BD's and adjust prn    GERD (gastroesophageal reflux disease)- (present on admission)  Assessment & Plan  Pepcid 20 mg daily    Hypertension associated with type 2 diabetes mellitus (HCC)- (present on admission)  Assessment & Plan  Continue losartan  50 mg daily    Anxiety disorder- (present on admission)  Assessment & Plan  Continue prozac 10 mg daily    Urinary incontinence- (present on admission)  Assessment & Plan  Continue mirabegron 25 mg daily    Type 2 diabetes mellitus with diabetic polyneuropathy, with long-term current use of insulin (HCC)- (present on admission)  Assessment & Plan  Hyperglycemic in the ER, mild DKA  Insulin received in the ER  Home dose Lantus 40 units  Continue LA insulin and ISS    5/2 BG still high, I increase the Lantus to 43 units, continue SSI  Hypoglycemia protocol  I ordered A1c    Parkinson's disease (HCC)- (present on admission)  Assessment & Plan  Significant hand tremor   continue outpatient sinemet         VTE prophylaxis:    enoxaparin ppx      I have performed a physical exam and reviewed and updated ROS and Plan today (5/2/2024). In review of yesterday's note (5/1/2024), there are no changes except as documented above.    I spent greater than 52 minutes for chart review, obtaining history independently, performing medically appropriate examination,  documenting , ordering medications, tests, or procedures, referring and communicating with other health care professionals, Independently interpreting results and communicating results with patient/family/caregiver. More than 50% of time was spent in face-to-face clinical encounter.

## 2024-05-02 NOTE — ASSESSMENT & PLAN NOTE
Reported dysuria  UA WBC 0-2, negative for bacteria  I ordered a urine culture  Patient is on Unasyn for pneumonia, continue

## 2024-05-03 ENCOUNTER — APPOINTMENT (OUTPATIENT)
Dept: CARDIOLOGY | Facility: MEDICAL CENTER | Age: 79
DRG: 193 | End: 2024-05-03
Attending: STUDENT IN AN ORGANIZED HEALTH CARE EDUCATION/TRAINING PROGRAM
Payer: COMMERCIAL

## 2024-05-03 PROBLEM — E83.39 HYPOPHOSPHATEMIA: Status: ACTIVE | Noted: 2024-05-03

## 2024-05-03 LAB
ANION GAP SERPL CALC-SCNC: 10 MMOL/L (ref 7–16)
BUN SERPL-MCNC: 13 MG/DL (ref 8–22)
C DIFF DNA SPEC QL NAA+PROBE: NEGATIVE
C DIFF TOX GENS STL QL NAA+PROBE: NEGATIVE
CALCIUM SERPL-MCNC: 8.6 MG/DL (ref 8.5–10.5)
CHLORIDE SERPL-SCNC: 103 MMOL/L (ref 96–112)
CO2 SERPL-SCNC: 27 MMOL/L (ref 20–33)
CREAT SERPL-MCNC: 0.56 MG/DL (ref 0.5–1.4)
ERYTHROCYTE [DISTWIDTH] IN BLOOD BY AUTOMATED COUNT: 44.9 FL (ref 35.9–50)
EST. AVERAGE GLUCOSE BLD GHB EST-MCNC: 212 MG/DL
GFR SERPLBLD CREATININE-BSD FMLA CKD-EPI: 101 ML/MIN/1.73 M 2
GLUCOSE BLD STRIP.AUTO-MCNC: 161 MG/DL (ref 65–99)
GLUCOSE BLD STRIP.AUTO-MCNC: 217 MG/DL (ref 65–99)
GLUCOSE BLD STRIP.AUTO-MCNC: 232 MG/DL (ref 65–99)
GLUCOSE BLD STRIP.AUTO-MCNC: 254 MG/DL (ref 65–99)
GLUCOSE SERPL-MCNC: 225 MG/DL (ref 65–99)
HBA1C MFR BLD: 9 % (ref 4–5.6)
HCT VFR BLD AUTO: 42.5 % (ref 42–52)
HGB BLD-MCNC: 14.2 G/DL (ref 14–18)
LV EJECT FRACT  99904: 60
MAGNESIUM SERPL-MCNC: 2.2 MG/DL (ref 1.5–2.5)
MCH RBC QN AUTO: 31 PG (ref 27–33)
MCHC RBC AUTO-ENTMCNC: 33.4 G/DL (ref 32.3–36.5)
MCV RBC AUTO: 92.8 FL (ref 81.4–97.8)
PHOSPHATE SERPL-MCNC: 2.1 MG/DL (ref 2.5–4.5)
PLATELET # BLD AUTO: 141 K/UL (ref 164–446)
PMV BLD AUTO: 10.4 FL (ref 9–12.9)
POTASSIUM SERPL-SCNC: 3.6 MMOL/L (ref 3.6–5.5)
RBC # BLD AUTO: 4.58 M/UL (ref 4.7–6.1)
SCCMEC + MECA PNL NOSE NAA+PROBE: POSITIVE
SODIUM SERPL-SCNC: 140 MMOL/L (ref 135–145)
TSH SERPL DL<=0.005 MIU/L-ACNC: 0.7 UIU/ML (ref 0.38–5.33)
WBC # BLD AUTO: 7.9 K/UL (ref 4.8–10.8)

## 2024-05-03 PROCEDURE — 99233 SBSQ HOSP IP/OBS HIGH 50: CPT | Performed by: STUDENT IN AN ORGANIZED HEALTH CARE EDUCATION/TRAINING PROGRAM

## 2024-05-03 PROCEDURE — 93306 TTE W/DOPPLER COMPLETE: CPT | Mod: 26 | Performed by: INTERNAL MEDICINE

## 2024-05-03 RX ORDER — DOXYCYCLINE 100 MG/1
100 TABLET ORAL EVERY 12 HOURS
Status: DISCONTINUED | OUTPATIENT
Start: 2024-05-03 | End: 2024-05-03

## 2024-05-03 RX ORDER — FUROSEMIDE 10 MG/ML
20 INJECTION INTRAMUSCULAR; INTRAVENOUS ONCE
Qty: 4 ML | Refills: 0 | Status: COMPLETED | OUTPATIENT
Start: 2024-05-03 | End: 2024-05-03

## 2024-05-03 RX ADMIN — PRAVASTATIN SODIUM 20 MG: 20 TABLET ORAL at 21:24

## 2024-05-03 RX ADMIN — LOSARTAN POTASSIUM 50 MG: 50 TABLET, FILM COATED ORAL at 06:01

## 2024-05-03 RX ADMIN — ROPINIROLE HYDROCHLORIDE 0.5 MG: 0.5 TABLET, FILM COATED ORAL at 14:32

## 2024-05-03 RX ADMIN — AZITHROMYCIN DIHYDRATE 500 MG: 250 TABLET, FILM COATED ORAL at 05:58

## 2024-05-03 RX ADMIN — CARBIDOPA AND LEVODOPA 1 TABLET: 50; 200 TABLET, EXTENDED RELEASE ORAL at 06:01

## 2024-05-03 RX ADMIN — DIBASIC SODIUM PHOSPHATE, MONOBASIC POTASSIUM PHOSPHATE AND MONOBASIC SODIUM PHOSPHATE 500 MG: 852; 155; 130 TABLET ORAL at 08:32

## 2024-05-03 RX ADMIN — CARBIDOPA AND LEVODOPA 1 TABLET: 50; 200 TABLET, EXTENDED RELEASE ORAL at 17:22

## 2024-05-03 RX ADMIN — ROPINIROLE HYDROCHLORIDE 0.5 MG: 0.5 TABLET, FILM COATED ORAL at 08:32

## 2024-05-03 RX ADMIN — FUROSEMIDE 20 MG: 10 INJECTION INTRAMUSCULAR; INTRAVENOUS at 13:13

## 2024-05-03 RX ADMIN — DIBASIC SODIUM PHOSPHATE, MONOBASIC POTASSIUM PHOSPHATE AND MONOBASIC SODIUM PHOSPHATE 500 MG: 852; 155; 130 TABLET ORAL at 17:31

## 2024-05-03 RX ADMIN — CARBIDOPA AND LEVODOPA 1 TABLET: 50; 200 TABLET, EXTENDED RELEASE ORAL at 12:09

## 2024-05-03 RX ADMIN — CHOLECALCIFEROL TAB 125 MCG (5000 UNIT) 5000 UNITS: 125 TAB at 06:01

## 2024-05-03 RX ADMIN — AMOXICILLIN AND CLAVULANATE POTASSIUM 1 TABLET: 875; 125 TABLET, FILM COATED ORAL at 06:00

## 2024-05-03 RX ADMIN — CARBIDOPA AND LEVODOPA 1 TABLET: 50; 200 TABLET, EXTENDED RELEASE ORAL at 14:32

## 2024-05-03 RX ADMIN — ENOXAPARIN SODIUM 40 MG: 100 INJECTION SUBCUTANEOUS at 17:22

## 2024-05-03 RX ADMIN — AMOXICILLIN AND CLAVULANATE POTASSIUM 1 TABLET: 875; 125 TABLET, FILM COATED ORAL at 17:22

## 2024-05-03 RX ADMIN — HUMAN ALBUMIN MICROSPHERES AND PERFLUTREN 3 ML: 10; .22 INJECTION, SOLUTION INTRAVENOUS at 17:00

## 2024-05-03 RX ADMIN — INSULIN HUMAN 3 UNITS: 100 INJECTION, SOLUTION PARENTERAL at 08:29

## 2024-05-03 RX ADMIN — INSULIN HUMAN 4 UNITS: 100 INJECTION, SOLUTION PARENTERAL at 17:10

## 2024-05-03 RX ADMIN — CARBIDOPA AND LEVODOPA 1 TABLET: 50; 200 TABLET, EXTENDED RELEASE ORAL at 08:32

## 2024-05-03 RX ADMIN — FLUOXETINE HYDROCHLORIDE 10 MG: 10 CAPSULE ORAL at 06:01

## 2024-05-03 RX ADMIN — MEMANTINE HYDROCHLORIDE 10 MG: 10 TABLET ORAL at 17:22

## 2024-05-03 RX ADMIN — ROPINIROLE HYDROCHLORIDE 0.5 MG: 0.5 TABLET, FILM COATED ORAL at 21:24

## 2024-05-03 RX ADMIN — Medication 3 MG: at 21:24

## 2024-05-03 RX ADMIN — INSULIN HUMAN 7 UNITS: 100 INJECTION, SOLUTION PARENTERAL at 21:30

## 2024-05-03 RX ADMIN — DONEPEZIL HYDROCHLORIDE 10 MG: 5 TABLET, FILM COATED ORAL at 21:24

## 2024-05-03 RX ADMIN — FAMOTIDINE 20 MG: 20 TABLET, FILM COATED ORAL at 08:32

## 2024-05-03 RX ADMIN — TAMSULOSIN HYDROCHLORIDE 0.4 MG: 0.4 CAPSULE ORAL at 08:32

## 2024-05-03 RX ADMIN — FLUOXETINE HYDROCHLORIDE 10 MG: 10 CAPSULE ORAL at 17:22

## 2024-05-03 RX ADMIN — DIBASIC SODIUM PHOSPHATE, MONOBASIC POTASSIUM PHOSPHATE AND MONOBASIC SODIUM PHOSPHATE 500 MG: 852; 155; 130 TABLET ORAL at 12:09

## 2024-05-03 RX ADMIN — CARBIDOPA AND LEVODOPA 1 TABLET: 50; 200 TABLET, EXTENDED RELEASE ORAL at 21:24

## 2024-05-03 RX ADMIN — INSULIN HUMAN 4 UNITS: 100 INJECTION, SOLUTION PARENTERAL at 13:14

## 2024-05-03 RX ADMIN — MEMANTINE HYDROCHLORIDE 10 MG: 10 TABLET ORAL at 06:01

## 2024-05-03 NOTE — CARE PLAN
The patient is Stable - Low risk of patient condition declining or worsening    Shift Goals  Clinical Goals: Monitor BG and patient safety throughout shift  Patient Goals: Comfort and pain control  Family Goals: ANTONIETA    Patient a/o x 3. Patient stated he had pain of low back earlier at rate of 4. Contacted on call hospitalist LORI Andrews. Order rcvd for Tylenol and given. Patient currently resting comfortably. Respirations are even and unlabored. Call light and personal belongings are within reach. All needs met at this time.     Progress made toward(s) clinical / shift goals:    Problem: Pain - Standard  Goal: Alleviation of pain or a reduction in pain to the patient’s comfort goal  Outcome: Progressing     Problem: Knowledge Deficit - Standard  Goal: Patient and family/care givers will demonstrate understanding of plan of care, disease process/condition, diagnostic tests and medications  Outcome: Progressing     Problem: Skin Integrity  Goal: Skin integrity is maintained or improved  Outcome: Progressing     Problem: Fall Risk  Goal: Patient will remain free from falls  Outcome: Progressing

## 2024-05-03 NOTE — CARE PLAN
The patient is Watcher - Medium risk of patient condition declining or worsening    Shift Goals  Clinical Goals: Patient will state situation correctly  Patient Goals: water  Family Goals: updates    Patient is not progressing towards the following goals:      Problem: Fall Risk  Goal: Patient will remain free from falls  Description: Target End Date:  Prior to discharge or change in level of care    Document interventions on the Lynda Cook Fall Risk Assessment    1.  Assess for fall risk factors  2.  Implement fall precautions  Outcome: Not Progressing

## 2024-05-03 NOTE — WOUND TEAM
[facility]  Inpatient Services  Initial Wound and Skin Care Evaluation    Admission Date: 5/1/2024     Last order of IP CONSULT TO WOUND CARE was found on 5/1/2024 from Hospital Encounter on 5/1/2024     HPI, PMH, SH: Reviewed    History reviewed. No pertinent surgical history.  Social History     Tobacco Use    Smoking status: Former     Types: Cigarettes    Smokeless tobacco: Not on file   Substance Use Topics    Alcohol use: Not Currently     Chief Complaint   Patient presents with    T-5000 GLF     Diagnosis: Acute respiratory failure with hypoxia (HCC) [J96.01]    Unit where seen by Wound Team: S606/00     WOUND CONSULT RELATED TO:  buttocks    WOUND TEAM PLAN OF CARE - Frequency of Follow-up:   Nursing to follow dressing orders written for wound care. Contact wound team if area fails to progress, deteriorates or with any questions/concerns if something comes up before next scheduled follow up (See below as to whether wound is following and frequency of wound follow up)   Bi-Monthly -      WOUND HISTORY:   Pt is a 79 yo male who presented to the ED after a fall with hip pain.  Admitted for pneumonia, weakness, leukocytosis, hypoxia, hyperglycemia. Pt states he has had a wound on his buttocks for a long time.          WOUND ASSESSMENT/LDA  Wound 05/01/24 Pressure Injury Sacrum (Active)   Date First Assessed/Time First Assessed: 05/01/24 5399   Present on Original Admission: Yes  Primary Wound Type: Pressure Injury  Location: Sacrum      Assessments 5/2/2024  5:00 PM   Wound Image      Periwound Assessment Scar tissue   Drainage Amount Scant   Drainage Description Serosanguineous   Periwound Protectant Barrier Paste   Dressing Status Other (Comment)   Dressing Changed Changed   Dressing Change/Treatment Frequency Every Shift, and As Needed   NEXT Dressing Change/Treatment Date 05/03/24   NEXT Weekly Photo (Inpatient Only) 05/09/24   Wound Team Following Other (comment)   Wound Length (cm) 3 cm    Wound Width (cm) 3 cm   Wound Depth (cm) 0.1 cm   Wound Surface Area (cm^2) 9 cm^2   Wound Volume (cm^3) 0.9 cm^3   Wound Bed Granulation (%) 100 %   Wound Odor None   WOUND NURSE ONLY - Time Spent with Patient (mins) 60        Vascular:    DENG:   No results found.    Lab Values:    Lab Results   Component Value Date/Time    WBC 7.9 05/03/2024 01:36 AM    RBC 4.58 (L) 05/03/2024 01:36 AM    HEMOGLOBIN 14.2 05/03/2024 01:36 AM    HEMATOCRIT 42.5 05/03/2024 01:36 AM    HBA1C 9.0 (H) 05/03/2024 01:36 AM         Culture Results show:  No results found for this or any previous visit (from the past 720 hour(s)).    Pain Level/Medicated:  None, Tolerated without pain medication       INTERVENTIONS BY WOUND TEAM:  Chart and images reviewed. Discussed with bedside RN. All areas of concern (based on picture review, LDA review and discussion with bedside RN) have been thoroughly assessed. Documentation of areas based on significant findings. This RN in to assess patient. Performed standard wound care which includes appropriate positioning, dressing removal and non-selective debridement. Pictures and measurements obtained weekly if/when required.    Wound:  L buttock  Preparation for Dressing removal: Removed without difficulty  Cleansed/Non-selectively Debrided with:  Wound cleanser and Gauze  Raquel wound: Cleansed with Wound cleanser and Gauze, Prepped with Barrier paste  Primary Dressing:       Advanced Wound Care Discharge Planning  Number of Clinicians necessary to complete wound care: 1  Is patient requiring IV pain medications for dressing changes:  No   Length of time for dressing change 45 min. (This does not include chart review, pre-medication time, set up, clean up or time spent charting.)  45  Interdisciplinary consultation: Patient, Bedside RN (), .      EVALUATION / RATIONALE FOR TREATMENT:     Date:  05/03/24  Wound Status:  Initial evaluation    Pt has a chronic wound on his L buttocks, there is scar tissue  karina wound.  Wounds appear as if they will resolve with protection and off loading.           Goals: Steady decrease in wound area weekly.    NURSING PLAN OF CARE ORDERS:  Dressing changes: See Dressing Care orders    NUTRITION            PREVENTATIVE INTERVENTIONS:    Q shift Fermin - performed per nursing policy  Q shift pressure point assessments - performed per nursing policy    Surface/Positioning  TAPs Turning system - Ordered    Offloading/Redistribution  Heel offloading dressing (Silicone dressing) - Ordered    Anticipated discharge plans:  TBD        Vac Discharge Needs:  Vac Discharge plan is purely a recommendation from wound team and not a requirement for discharge unless otherwise stated by physician.  Not Applicable Pt not on a wound vac

## 2024-05-03 NOTE — DISCHARGE PLANNING
Case Management Discharge Planning    Admission Date: 5/1/2024  GMLOS: 4.1  ALOS: 2    6-Clicks ADL Score: 12  6-Clicks Mobility Score: 11  PT and/or OT Eval ordered: Yes  Post-acute Referrals Ordered: Yes  Post-acute Choice Obtained: No  Has referral(s) been sent to post-acute provider:  Yes      Anticipated Discharge Dispo: Discharge Disposition: Discharged to home/self care (01)    DME Needed: No    Action(s) Taken: LMSW sent blanket SNF referrals.     Escalations Completed: None    Medically Clear: No    Next Steps: LMSW to follow for placement.     Barriers to Discharge: Medical clearance and Pending Placement    Is the patient up for discharge tomorrow: No

## 2024-05-03 NOTE — ANTIMICROBIAL STEWARDSHIP
Antimicrobial Stewardship Rounds Note    Date  5/2/2024    Assessment  Patient chart reviewed during antimicrobial stewardship rounds with antimicrobial stewardship medical director Dr. Vargas Ramon. Patient on Unasyn and azithromycin for suspected community-acquired pneumonia. After 2 days of antibiotics patient is afebrile, has an improved leukocytosis, is on 1 L of oxygen via nasal cannula, and has a negative procalcitonin.    Recommendation  Based on the assessment above, the antimicrobial stewardship team recommends completing 5 days of antibiotics for CAP with amoxicillin-clavulanate 875-125 mg by mouth twice daily. Discussed with Dr Dillon, who agreed. Orders were updated in the chart by this pharmacist.     Buddy Lemos, PharmD  PGY-2 Infectious Diseases Pharmacy Resident

## 2024-05-03 NOTE — PROGRESS NOTES
Hospital Medicine Daily Progress Note    Date of Service  5/3/2024    Chief Complaint  Mc Krishnamurthy is a 78 y.o. male admitted 5/1/2024 with generalized weakness and pneumonia    Hospital Course  Mc Krishnamurthy is a 78 y.o. male who presented 5/1/2024 with Parkinson's disease, type 2 diabetes mellitus, COPD and hypertension who presents to the hospital from his assisted living facility.  Patient states that he is weak and fell from his walker and landed on his right hip and had pain.  He also endorses continued and worsening shortness of breath with productive cough with greenish sputum but no blood.   In the emergency room he was found to be hypoxic on room air to 87% and was placed on oxygen supplementation.  Chest x-ray was concerning for bibasilar pneumonia was placed on empiric IV Unasyn and azithromycin.  His hip x-ray was negative for fracture.  He also was found to be persistently hyperglycemic with very mild DKA and was given multiple rounds of IV insulin in the ER.     Aox4, hands tremor    Interval Problem Update  I saw and examined the patient at bedside  I discussed the care plan with the son at bedside  Reported cough, dysuria, chronic diarrhea, abdominal pain.     On 1L NC   Elevated proBNP 1260   Echo pending  I reviewed the chest x-ray, mildly pulmonary congestion, I ordered 1 dose of IV Lasix 20 mg    C. Diff pending    BG high, I increased Lantus to 45 units  A1c 9    Phos 2.1 -replaced    MRSA screen positive, I added doxycycline p.o.    Wbc 14>12    I have discussed this patient's plan of care and discharge plan at IDT rounds today with Case Management, Nursing, Nursing leadership, and other members of the IDT team.    Consultants/Specialty      Code Status  DNAR/DNI    Disposition  The patient is not medically cleared for discharge to home or a post-acute facility.      I have placed the appropriate orders for post-discharge needs.    Review of Systems   All 12 systems were reviewed and negative  except as mentioned above      Physical Exam  Temp:  [36 °C (96.8 °F)-36.9 °C (98.5 °F)] 36.3 °C (97.4 °F)  Pulse:  [67-80] 79  Resp:  [16-20] 16  BP: (105-128)/(56-68) 106/56  SpO2:  [93 %-95 %] 94 %    Physical Exam  Constitutional:       General: He is in acute distress.      Appearance: He is ill-appearing.   HENT:      Head: Normocephalic.      Mouth/Throat:      Mouth: Mucous membranes are moist.   Eyes:      Extraocular Movements: Extraocular movements intact.      Conjunctiva/sclera: Conjunctivae normal.   Cardiovascular:      Rate and Rhythm: Normal rate and regular rhythm.      Pulses: Normal pulses.      Heart sounds: Normal heart sounds.   Pulmonary:      Effort: Pulmonary effort is normal.      Breath sounds: Rales present.   Abdominal:      General: Bowel sounds are normal.      Palpations: Abdomen is soft.      Tenderness: There is no abdominal tenderness. There is no guarding.   Musculoskeletal:         General: No swelling or tenderness.      Cervical back: Normal range of motion and neck supple.   Skin:     General: Skin is warm.   Neurological:      Mental Status: He is alert and oriented to person, place, and time. Mental status is at baseline.   Psychiatric:         Mood and Affect: Mood normal.         Fluids    Intake/Output Summary (Last 24 hours) at 5/3/2024 1225  Last data filed at 5/3/2024 1000  Gross per 24 hour   Intake 600 ml   Output --   Net 600 ml       Laboratory  Recent Labs     05/01/24  0613 05/02/24  0146 05/03/24  0136   WBC 14.3* 12.6* 7.9   RBC 4.84 4.62* 4.58*   HEMOGLOBIN 15.4 14.6 14.2   HEMATOCRIT 44.4 41.8* 42.5   MCV 91.7 90.5 92.8   MCH 31.8 31.6 31.0   MCHC 34.7 34.9 33.4   RDW 43.3 43.8 44.9   PLATELETCT 173 165 141*   MPV 10.8 10.6 10.4     Recent Labs     05/01/24  0613 05/02/24  0146 05/03/24  0136   SODIUM 135 138 140   POTASSIUM 4.3 3.5* 3.6   CHLORIDE 99 101 103   CO2 19* 25 27   GLUCOSE 320* 206* 225*   BUN 20 12 13   CREATININE 0.91 0.77 0.56   CALCIUM 8.8  8.3* 8.6                   Imaging  DX-CHEST-PORTABLE (1 VIEW)   Final Result      Bilateral basilar atelectasis and/or consolidation. Underlying infection is possible.      DX-HIP-COMPLETE - UNILATERAL 2+ RIGHT   Final Result         1.  No radiographic evidence of acute traumatic injury.      EC-ECHOCARDIOGRAM COMPLETE W/ CONT    (Results Pending)        Assessment/Plan  * Acute respiratory failure with hypoxia (HCC)- (present on admission)  Assessment & Plan  Presumed 2/2 CAP  See below  O2 supplementation    5/3 Elevated proBNP 1260   Echo pending  I reviewed the chest x-ray, mildly pulmonary congestion, I ordered 1 dose of IV Lasix 20 mg  I ordered the a.m. CBC and CMP to monitor    Hypophosphatemia  Assessment & Plan  Replaced    Diarrhea  Assessment & Plan  Reported of chronic diarrhea, some abdominal pain  I ordered a c. diff    Dysuria  Assessment & Plan  Reported dysuria  UA WBC 0-2, negative for bacteria  I ordered a urine culture  Patient is on Unasyn for pneumonia, continue    Right hip pain- (present on admission)  Assessment & Plan  From fall this AM  Initial Hip xray negative for obvious fracture  Some persistent pain noted on exam  If persists, low threshold get cT hip to r/o occult fracture  PT/OT eval    Atrial flutter (HCC)- (present on admission)  Assessment & Plan  EKG at admission reported aflutter; I personally reviewed EKG, my personal interpretation is  sinus rhythm  Repeated EKG today showed a sinus rhythm    No history of atrial flutter or A-fib    HR in good range    CAP (community acquired pneumonia)- (present on admission)  Assessment & Plan  Check Mrsa nares  Sputum and blood cultures  SLP eval for swallowing issues, especially in light of his long-standing Parkinson's    5/3 MRSA screen positive, I ordered the doxycycline  Continue Unasyn        Recurrent major depressive disorder, in partial remission (HCC)- (present on admission)  Assessment & Plan  Prozac 10 mg  daily    Dyslipidemia associated with type 2 diabetes mellitus ([facility])- (present on admission)  Assessment & Plan  Continue pravastatin    BMI 33.0-33.9,adult- (present on admission)  Assessment & Plan  Outpatient nutritional counseling    COPD (chronic obstructive pulmonary disease) ([facility])- (present on admission)  Assessment & Plan  No clear exacerbation at this time  BD's and adjust prn    GERD (gastroesophageal reflux disease)- (present on admission)  Assessment & Plan  Pepcid 20 mg daily    Hypertension associated with type 2 diabetes mellitus ([facility])- (present on admission)  Assessment & Plan  Continue losartan 50 mg daily    Anxiety disorder- (present on admission)  Assessment & Plan  Continue prozac 10 mg daily    Urinary incontinence- (present on admission)  Assessment & Plan  Continue mirabegron 25 mg daily    Type 2 diabetes mellitus with diabetic polyneuropathy, with long-term current use of insulin ([facility])- (present on admission)  Assessment & Plan  Hyperglycemic in the ER, mild DKA  Insulin received in the ER  Home dose Lantus 40 units  Continue LA insulin and ISS  A1c 9    5/2 BG still high, I increase the Lantus to 43 units, continue SSI  5/3 BG still high, I increase Lantus to 45 units, continue SSI  Hypoglycemia    Parkinson's disease ([facility])- (present on admission)  Assessment & Plan  Significant hand tremor   continue outpatient sinemet         VTE prophylaxis: LOVENOX    I have performed a physical exam and reviewed and updated ROS and Plan today (5/3/2024). In review of yesterday's note (5/2/2024), there are no changes except as documented above.    I spent greater than 51 minutes for chart review, obtaining history independently, performing medically appropriate examination,  documenting , ordering medications, tests, or procedures, referring and communicating with other health care professionals, Independently interpreting results and communicating results with patient/family/caregiver. More than 50%  of time was spent in face-to-face clinical encounter.     ROS

## 2024-05-03 NOTE — CARE PLAN
The patient is Stable - Low risk of patient condition declining or worsening    Shift Goals  Clinical Goals: monitor blood glucose, skin preventive measures,  Patient Goals: Comfort and pain control  Family Goals: ANTONIETA\    Progress made toward(s) clinical / shift goals:  monitoring glucose coverage given, AWAIS mattress in place, q2 turns. Call light in place. Bed alarm on. Needs attended.     Patient is not progressing towards the following goals:

## 2024-05-04 LAB
ANION GAP SERPL CALC-SCNC: 11 MMOL/L (ref 7–16)
BACTERIA UR CULT: NORMAL
BUN SERPL-MCNC: 16 MG/DL (ref 8–22)
CALCIUM SERPL-MCNC: 8.4 MG/DL (ref 8.5–10.5)
CHLORIDE SERPL-SCNC: 102 MMOL/L (ref 96–112)
CO2 SERPL-SCNC: 28 MMOL/L (ref 20–33)
CREAT SERPL-MCNC: 0.68 MG/DL (ref 0.5–1.4)
ERYTHROCYTE [DISTWIDTH] IN BLOOD BY AUTOMATED COUNT: 43.1 FL (ref 35.9–50)
GFR SERPLBLD CREATININE-BSD FMLA CKD-EPI: 96 ML/MIN/1.73 M 2
GLUCOSE BLD STRIP.AUTO-MCNC: 190 MG/DL (ref 65–99)
GLUCOSE BLD STRIP.AUTO-MCNC: 201 MG/DL (ref 65–99)
GLUCOSE BLD STRIP.AUTO-MCNC: 214 MG/DL (ref 65–99)
GLUCOSE BLD STRIP.AUTO-MCNC: 218 MG/DL (ref 65–99)
GLUCOSE BLD STRIP.AUTO-MCNC: 271 MG/DL (ref 65–99)
GLUCOSE SERPL-MCNC: 191 MG/DL (ref 65–99)
HCT VFR BLD AUTO: 42.8 % (ref 42–52)
HGB BLD-MCNC: 14.5 G/DL (ref 14–18)
MCH RBC QN AUTO: 30.7 PG (ref 27–33)
MCHC RBC AUTO-ENTMCNC: 33.9 G/DL (ref 32.3–36.5)
MCV RBC AUTO: 90.5 FL (ref 81.4–97.8)
PHOSPHATE SERPL-MCNC: 3.8 MG/DL (ref 2.5–4.5)
PLATELET # BLD AUTO: 167 K/UL (ref 164–446)
PMV BLD AUTO: 10.9 FL (ref 9–12.9)
POTASSIUM SERPL-SCNC: 3.7 MMOL/L (ref 3.6–5.5)
RBC # BLD AUTO: 4.73 M/UL (ref 4.7–6.1)
SIGNIFICANT IND 70042: NORMAL
SITE SITE: NORMAL
SODIUM SERPL-SCNC: 141 MMOL/L (ref 135–145)
SOURCE SOURCE: NORMAL
WBC # BLD AUTO: 6.6 K/UL (ref 4.8–10.8)

## 2024-05-04 PROCEDURE — 99232 SBSQ HOSP IP/OBS MODERATE 35: CPT | Performed by: STUDENT IN AN ORGANIZED HEALTH CARE EDUCATION/TRAINING PROGRAM

## 2024-05-04 RX ORDER — ALBUTEROL SULFATE 90 UG/1
2 AEROSOL, METERED RESPIRATORY (INHALATION) EVERY 4 HOURS PRN
Status: SHIPPED
Start: 2024-05-04

## 2024-05-04 RX ORDER — CARBOXYMETHYLCELLULOSE SODIUM 5 MG/ML
1 SOLUTION/ DROPS OPHTHALMIC 2 TIMES DAILY
Status: SHIPPED
Start: 2024-05-04

## 2024-05-04 RX ORDER — AMOXICILLIN AND CLAVULANATE POTASSIUM 875; 125 MG/1; MG/1
1 TABLET, FILM COATED ORAL EVERY 12 HOURS
Qty: 4 TABLET | Refills: 0 | Status: ACTIVE | OUTPATIENT
Start: 2024-05-04 | End: 2024-05-06

## 2024-05-04 RX ORDER — DONEPEZIL HYDROCHLORIDE 10 MG/1
10 TABLET, FILM COATED ORAL NIGHTLY
Status: SHIPPED
Start: 2024-05-04

## 2024-05-04 RX ORDER — ACETAMINOPHEN 325 MG/1
650 TABLET ORAL EVERY 6 HOURS PRN
Status: SHIPPED
Start: 2024-05-04

## 2024-05-04 RX ORDER — TRAZODONE HYDROCHLORIDE 50 MG/1
50 TABLET ORAL
Status: DISCONTINUED | OUTPATIENT
Start: 2024-05-04 | End: 2024-05-09 | Stop reason: HOSPADM

## 2024-05-04 RX ORDER — LOPERAMIDE HYDROCHLORIDE 2 MG/1
2 CAPSULE ORAL 4 TIMES DAILY PRN
Status: SHIPPED
Start: 2024-05-04 | End: 2024-05-06

## 2024-05-04 RX ORDER — LOPERAMIDE HYDROCHLORIDE 2 MG/1
2 CAPSULE ORAL 4 TIMES DAILY PRN
Status: DISCONTINUED | OUTPATIENT
Start: 2024-05-04 | End: 2024-05-09 | Stop reason: HOSPADM

## 2024-05-04 RX ORDER — GUAIFENESIN 200 MG/10ML
10 LIQUID ORAL EVERY 4 HOURS PRN
Status: DISCONTINUED | OUTPATIENT
Start: 2024-05-04 | End: 2024-05-09 | Stop reason: HOSPADM

## 2024-05-04 RX ADMIN — FLUOXETINE HYDROCHLORIDE 10 MG: 10 CAPSULE ORAL at 04:31

## 2024-05-04 RX ADMIN — PRAVASTATIN SODIUM 20 MG: 20 TABLET ORAL at 21:01

## 2024-05-04 RX ADMIN — INSULIN HUMAN 3 UNITS: 100 INJECTION, SOLUTION PARENTERAL at 17:30

## 2024-05-04 RX ADMIN — CARBIDOPA AND LEVODOPA 1 TABLET: 50; 200 TABLET, EXTENDED RELEASE ORAL at 17:25

## 2024-05-04 RX ADMIN — CHOLECALCIFEROL TAB 125 MCG (5000 UNIT) 5000 UNITS: 125 TAB at 04:32

## 2024-05-04 RX ADMIN — INSULIN HUMAN 4 UNITS: 100 INJECTION, SOLUTION PARENTERAL at 21:09

## 2024-05-04 RX ADMIN — ROPINIROLE HYDROCHLORIDE 0.5 MG: 0.5 TABLET, FILM COATED ORAL at 08:41

## 2024-05-04 RX ADMIN — TRAZODONE HYDROCHLORIDE 50 MG: 50 TABLET ORAL at 21:01

## 2024-05-04 RX ADMIN — INSULIN HUMAN 7 UNITS: 100 INJECTION, SOLUTION PARENTERAL at 12:13

## 2024-05-04 RX ADMIN — CARBIDOPA AND LEVODOPA 1 TABLET: 50; 200 TABLET, EXTENDED RELEASE ORAL at 04:32

## 2024-05-04 RX ADMIN — DONEPEZIL HYDROCHLORIDE 10 MG: 5 TABLET, FILM COATED ORAL at 21:01

## 2024-05-04 RX ADMIN — TAMSULOSIN HYDROCHLORIDE 0.4 MG: 0.4 CAPSULE ORAL at 08:41

## 2024-05-04 RX ADMIN — ACETAMINOPHEN 650 MG: 325 TABLET, FILM COATED ORAL at 03:52

## 2024-05-04 RX ADMIN — CARBIDOPA AND LEVODOPA 1 TABLET: 50; 200 TABLET, EXTENDED RELEASE ORAL at 08:41

## 2024-05-04 RX ADMIN — FLUOXETINE HYDROCHLORIDE 10 MG: 10 CAPSULE ORAL at 17:25

## 2024-05-04 RX ADMIN — LOPERAMIDE HYDROCHLORIDE 2 MG: 2 CAPSULE ORAL at 08:41

## 2024-05-04 RX ADMIN — ENOXAPARIN SODIUM 40 MG: 100 INJECTION SUBCUTANEOUS at 17:25

## 2024-05-04 RX ADMIN — MEMANTINE HYDROCHLORIDE 10 MG: 10 TABLET ORAL at 17:25

## 2024-05-04 RX ADMIN — MEMANTINE HYDROCHLORIDE 10 MG: 10 TABLET ORAL at 04:31

## 2024-05-04 RX ADMIN — AMOXICILLIN AND CLAVULANATE POTASSIUM 1 TABLET: 875; 125 TABLET, FILM COATED ORAL at 17:25

## 2024-05-04 RX ADMIN — INSULIN HUMAN 4 UNITS: 100 INJECTION, SOLUTION PARENTERAL at 08:42

## 2024-05-04 RX ADMIN — ROPINIROLE HYDROCHLORIDE 0.5 MG: 0.5 TABLET, FILM COATED ORAL at 15:13

## 2024-05-04 RX ADMIN — ROPINIROLE HYDROCHLORIDE 0.5 MG: 0.5 TABLET, FILM COATED ORAL at 21:01

## 2024-05-04 RX ADMIN — CARBIDOPA AND LEVODOPA 1 TABLET: 50; 200 TABLET, EXTENDED RELEASE ORAL at 12:01

## 2024-05-04 RX ADMIN — AMOXICILLIN AND CLAVULANATE POTASSIUM 1 TABLET: 875; 125 TABLET, FILM COATED ORAL at 04:31

## 2024-05-04 RX ADMIN — CARBIDOPA AND LEVODOPA 1 TABLET: 50; 200 TABLET, EXTENDED RELEASE ORAL at 21:01

## 2024-05-04 RX ADMIN — GUAIFENESIN 200 MG: 100 SOLUTION ORAL at 21:01

## 2024-05-04 RX ADMIN — CARBIDOPA AND LEVODOPA 1 TABLET: 50; 200 TABLET, EXTENDED RELEASE ORAL at 15:13

## 2024-05-04 RX ADMIN — Medication 3 MG: at 23:42

## 2024-05-04 RX ADMIN — FAMOTIDINE 20 MG: 20 TABLET, FILM COATED ORAL at 08:46

## 2024-05-04 RX ADMIN — LOSARTAN POTASSIUM 50 MG: 50 TABLET, FILM COATED ORAL at 04:31

## 2024-05-04 ASSESSMENT — PAIN DESCRIPTION - PAIN TYPE: TYPE: CHRONIC PAIN

## 2024-05-04 NOTE — CARE PLAN
The patient is Stable - Low risk of patient condition declining or worsening    Shift Goals  Clinical Goals: Resolve buttock wound, safety  Patient Goals: Comfort  Family Goals: ANTONIETA\    Progress made toward(s) clinical / shift goals:      Patient is not progressing towards the following goals:    Pt Aox4. No c/o SOB, CP, change in any symptoms. Wound care completed per orders. Frequent T and R.

## 2024-05-04 NOTE — PROGRESS NOTES
[facility] Medicine Daily Progress Note    Date of Service  5/4/2024    Chief Complaint  Mc Krishnamurthy is a 78 y.o. male admitted 5/1/2024 with generalized weakness and pneumonia    Hospital Course  Mc Krishnamurthy is a 78 y.o. male who presented 5/1/2024 with Parkinson's disease, type 2 diabetes mellitus, COPD and hypertension who presents to the hospital from his assisted living facility.  Patient states that he is weak and fell from his walker and landed on his right hip and had pain.  He also endorses continued and worsening shortness of breath with productive cough with greenish sputum but no blood.   In the emergency room he was found to be hypoxic on room air to 87% and was placed on oxygen supplementation.  Chest x-ray was concerning for bibasilar pneumonia was placed on empiric IV Unasyn and azithromycin.  His hip x-ray was negative for fracture.  He also was found to be persistently hyperglycemic with very mild DKA and was given multiple rounds of IV insulin in the ER.     Aox4, hands tremor    Interval Problem Update  I saw and examined the patient at bedside  I discussed the care plan with the son at bedside  Reported cough, dysuria, chronic diarrhea, abdominal pain.     On 1L NC   Echo EF 60  C. Diff negative    BG high, I increased Lantus to 48 units  A1c 9  Wbc 14>6    I have discussed this patient's plan of care and discharge plan at IDT rounds today with Case Management, Nursing, Nursing leadership, and other members of the IDT team.    Consultants/Specialty      Code Status  DNAR/DNI    Disposition  The patient is medically cleared for discharge to home or a post-acute facility.      I have placed the appropriate orders for post-discharge needs.    Review of Systems   All 12 systems were reviewed and negative except as mentioned above      Physical Exam  Temp:  [36.4 °C (97.6 °F)-36.7 °C (98 °F)] 36.6 °C (97.8 °F)  Pulse:  [68-81] 81  Resp:  [17-24] 17  BP: (110-121)/(65-76) 116/76  SpO2:  [94 %-96 %] 95  %    Physical Exam  Constitutional:       General: He is in acute distress.      Appearance: He is ill-appearing.   HENT:      Head: Normocephalic.      Mouth/Throat:      Mouth: Mucous membranes are moist.   Eyes:      Extraocular Movements: Extraocular movements intact.      Conjunctiva/sclera: Conjunctivae normal.   Cardiovascular:      Rate and Rhythm: Normal rate and regular rhythm.      Pulses: Normal pulses.      Heart sounds: Normal heart sounds.   Pulmonary:      Effort: Pulmonary effort is normal.      Breath sounds: Rales present.   Abdominal:      General: Bowel sounds are normal.      Palpations: Abdomen is soft.      Tenderness: There is no abdominal tenderness. There is no guarding.   Musculoskeletal:         General: No swelling or tenderness.      Cervical back: Normal range of motion and neck supple.   Skin:     General: Skin is warm.   Neurological:      Mental Status: He is alert and oriented to person, place, and time. Mental status is at baseline.   Psychiatric:         Mood and Affect: Mood normal.         Fluids    Intake/Output Summary (Last 24 hours) at 5/4/2024 1403  Last data filed at 5/4/2024 0600  Gross per 24 hour   Intake 240 ml   Output 1350 ml   Net -1110 ml       Laboratory  Recent Labs     05/02/24  0146 05/03/24  0136 05/04/24  0313   WBC 12.6* 7.9 6.6   RBC 4.62* 4.58* 4.73   HEMOGLOBIN 14.6 14.2 14.5   HEMATOCRIT 41.8* 42.5 42.8   MCV 90.5 92.8 90.5   MCH 31.6 31.0 30.7   MCHC 34.9 33.4 33.9   RDW 43.8 44.9 43.1   PLATELETCT 165 141* 167   MPV 10.6 10.4 10.9     Recent Labs     05/02/24  0146 05/03/24  0136 05/04/24  0313   SODIUM 138 140 141   POTASSIUM 3.5* 3.6 3.7   CHLORIDE 101 103 102   CO2 25 27 28   GLUCOSE 206* 225* 191*   BUN 12 13 16   CREATININE 0.77 0.56 0.68   CALCIUM 8.3* 8.6 8.4*                   Imaging  EC-ECHOCARDIOGRAM COMPLETE W/ CONT   Final Result      DX-CHEST-PORTABLE (1 VIEW)   Final Result      Bilateral basilar atelectasis and/or consolidation.  Underlying infection is possible.      DX-HIP-COMPLETE - UNILATERAL 2+ RIGHT   Final Result         1.  No radiographic evidence of acute traumatic injury.           Assessment/Plan  * Acute respiratory failure with hypoxia (HCC)- (present on admission)  Assessment & Plan  Presumed 2/2 CAP  See below  O2 supplementation    Elevated proBNP 1260   Echo unremarkable, EF 60%  Received 1 dose of IV Lasix   Continue to wean down oxygen  Continue antibioticS    Hypophosphatemia  Assessment & Plan  Replaced    Diarrhea  Assessment & Plan  Reported of chronic diarrhea, some abdominal pain  C. difficile negative, I started the Imodium    Dysuria  Assessment & Plan  Reported dysuria  UA WBC 0-2, negative for bacteria  I ordered a urine culture  Patient is on Unasyn for pneumonia, continue    Right hip pain- (present on admission)  Assessment & Plan  From fall this AM  Initial Hip xray negative for obvious fracture  Some persistent pain noted on exam  If persists, low threshold get cT hip to r/o occult fracture  PT/OT eval    Atrial flutter (HCC)- (present on admission)  Assessment & Plan  EKG at admission reported aflutter; I personally reviewed EKG, my personal interpretation is  sinus rhythm  Repeated EKG today showed a sinus rhythm    No history of atrial flutter or A-fib    HR in good range    CAP (community acquired pneumonia)- (present on admission)  Assessment & Plan  Check Mrsa nares  Sputum and blood cultures  SLP eval for swallowing issues, especially in light of his long-standing Parkinson's    5/4 IV Unasyn transitioned to Augmentin        Recurrent major depressive disorder, in partial remission (HCC)- (present on admission)  Assessment & Plan  Prozac 10 mg daily    Dyslipidemia associated with type 2 diabetes mellitus (HCC)- (present on admission)  Assessment & Plan  Continue pravastatin    BMI 33.0-33.9,adult- (present on admission)  Assessment & Plan  Outpatient nutritional counseling    COPD (chronic  obstructive pulmonary disease) ([facility])- (present on admission)  Assessment & Plan  No clear exacerbation at this time  BD's and adjust prn    GERD (gastroesophageal reflux disease)- (present on admission)  Assessment & Plan  Pepcid 20 mg daily    Hypertension associated with type 2 diabetes mellitus ([facility])- (present on admission)  Assessment & Plan  Continue losartan 50 mg daily    Anxiety disorder- (present on admission)  Assessment & Plan  Continue prozac 10 mg daily    Urinary incontinence- (present on admission)  Assessment & Plan  Continue mirabegron 25 mg daily    Type 2 diabetes mellitus with diabetic polyneuropathy, with long-term current use of insulin ([facility])- (present on admission)  Assessment & Plan  Hyperglycemic in the ER, mild DKA  Insulin received in the ER  Home dose Lantus 40 units  Continue LA insulin and ISS  A1c 9    5/2 BG still high, I increase the Lantus to 43 units, continue SSI  5/3 BG still high, I increase Lantus to 45 units, continue SSI  5/4 BG high, I increase the Lantus to 48 units, continue SSI  Hypoglycemia protocol    Parkinson's disease ([facility])- (present on admission)  Assessment & Plan  Significant hand tremor   continue outpatient sinemet         VTE prophylaxis: LOVENOX    I have performed a physical exam and reviewed and updated ROS and Plan today (5/4/2024). In review of yesterday's note (5/3/2024), there are no changes except as documented above.    I spent 36 minutes for chart review, meeting and examining the patient, documenting, ordering medications and tests, interpreting the results independently, and communicating with the other health professionals.      ROS  VTE Selection

## 2024-05-04 NOTE — DISCHARGE PLANNING
Care Transition Team Assessment    CM RN met with pt at bedside to complete discharge assessment. Pt is A/Ox4 and agreeable to assessment. Pt verified information on face sheet.     - Prior to admission, pt lived at address [address] [place] [address]  - Pt stated needing assistance with ADL/IADLs, does not drive self   - Per pt, DME owned includes [employer]   - Per pt, no O2 was owned/used prior to admission   - Pt is insured through [employer]   - Per [facility]. Pt stated he is 30% service connected [employer]   - Preferred [facility] is [facility]     Information Source  Orientation Level: Oriented X4  Information Given By: Patient  Who is responsible for making decisions for patient? : Patient    Readmission Evaluation  Is this a readmission?: No    Elopement Risk  Legal Hold: No  Ambulatory or Self Mobile in Wheelchair: No-Not an Elopement Risk  Elopement Risk: Not at Risk for Elopement    Interdisciplinary Discharge Planning  Lives with - Patient's Self Care Capacity: Attendant / Paid Care Giver  Patient or legal guardian wants to designate a caregiver: No  Support Systems: Unable to Obtain at this Time  Housing / Facility: Assisted Living Residence  Prior Services: Intermittent Physical Support for ADL Per Service  Durable Medical Equipment: Unknown    Discharge Preparedness  What is your plan after discharge?: Skilled nursing facility  What are your discharge supports?: Child  Prior Functional Level: Needs Assist with Activities of Daily Living, Needs Assist with Medication Management    Functional Assesment  Prior Functional Level: Needs Assist with Activities of Daily Living, Needs Assist with Medication Management    Finances  Financial Barriers to Discharge: No  Prescription Coverage: Yes    Vision / Hearing Impairment  Vision Impairment : No  Right Eye Vision: Other (Comments), Impaired (States impaired depth perception since GLF)  Left Eye Vision: Other (Comments), Impaired  (States impaired depth perception since GLF)  Hearing Impairment : No    Advance Directive  Advance Directive?: None    Domestic Abuse  Have you ever been the victim of abuse or violence?: No  Physical Abuse or Sexual Abuse: No  Verbal Abuse or Emotional Abuse: No  Possible Abuse/Neglect Reported to:: Not Applicable    Discharge Risks or Barriers  Discharge risks or barriers?: Complex medical needs  Patient risk factors: Complex medical needs, Lack of outside supports    Anticipated Discharge Information  Discharge Disposition: D/T to SNF with Medicare cert in anticipation of skilled care (03)

## 2024-05-04 NOTE — CARE PLAN
The patient is Stable - Low risk of patient condition declining or worsening    Shift Goals  Clinical Goals: blood sugar monitoring, skin preventive measures  Patient Goals: Comfort  Family Goals: ANTONIETA\    Progress made toward(s) clinical / shift goals:  monitoring sugar, AWAIS mattress in place, routine rounding to check for incontinence. Call light in reach. Monitoring BM, pt. So far no BM, immodium started. Needs attended.       Problem: Pain - Standard  Goal: Alleviation of pain or a reduction in pain to the patient’s comfort goal  Outcome: Progressing     Problem: Knowledge Deficit - Standard  Goal: Patient and family/care givers will demonstrate understanding of plan of care, disease process/condition, diagnostic tests and medications  Outcome: Progressing     Problem: Skin Integrity  Goal: Skin integrity is maintained or improved  Outcome: Progressing     Problem: Fall Risk  Goal: Patient will remain free from falls  Outcome: Progressing       Patient is not progressing towards the following goals:

## 2024-05-04 NOTE — DISCHARGE PLANNING
Case Management Discharge Planning    Admission Date: 5/1/2024  GMLOS: 4.1  ALOS: 3    6-Clicks ADL Score: 12  6-Clicks Mobility Score: 11  PT and/or OT Eval ordered: Yes  Post-acute Referrals Ordered: Yes  Post-acute Choice Obtained: Yes  Has referral(s) been sent to post-acute provider:  Yes    Anticipated Discharge Dispo: Discharge Disposition: D/T to SNF with Medicare cert in anticipation of skilled care (03)    DME Needed: No    Action(s) Taken:   Pt discussed during IDT rounds. Per MD, pt it medically clear for SNF.     CM RN met with pt at bedside to discuss discharge plan. Pt completed choice form for SNF and form faxed to [facility] for processing.   1) [facility]   2) [facility]      Escalations Completed: None    Medically Clear: Yes    Next Steps:  LISBET RN to follow up with medical team to discuss discharge barriers or needs.     Barriers to Discharge: Pending Placement

## 2024-05-05 LAB
ANION GAP SERPL CALC-SCNC: 9 MMOL/L (ref 7–16)
BUN SERPL-MCNC: 14 MG/DL (ref 8–22)
CALCIUM SERPL-MCNC: 8.9 MG/DL (ref 8.5–10.5)
CHLORIDE SERPL-SCNC: 103 MMOL/L (ref 96–112)
CO2 SERPL-SCNC: 28 MMOL/L (ref 20–33)
CREAT SERPL-MCNC: 0.73 MG/DL (ref 0.5–1.4)
ERYTHROCYTE [DISTWIDTH] IN BLOOD BY AUTOMATED COUNT: 43.4 FL (ref 35.9–50)
GFR SERPLBLD CREATININE-BSD FMLA CKD-EPI: 93 ML/MIN/1.73 M 2
GLUCOSE BLD STRIP.AUTO-MCNC: 207 MG/DL (ref 65–99)
GLUCOSE BLD STRIP.AUTO-MCNC: 215 MG/DL (ref 65–99)
GLUCOSE BLD STRIP.AUTO-MCNC: 218 MG/DL (ref 65–99)
GLUCOSE BLD STRIP.AUTO-MCNC: 282 MG/DL (ref 65–99)
GLUCOSE SERPL-MCNC: 215 MG/DL (ref 65–99)
HCT VFR BLD AUTO: 41.7 % (ref 42–52)
HGB BLD-MCNC: 14.2 G/DL (ref 14–18)
MCH RBC QN AUTO: 31.3 PG (ref 27–33)
MCHC RBC AUTO-ENTMCNC: 34.1 G/DL (ref 32.3–36.5)
MCV RBC AUTO: 91.9 FL (ref 81.4–97.8)
PLATELET # BLD AUTO: 174 K/UL (ref 164–446)
PMV BLD AUTO: 10.4 FL (ref 9–12.9)
POTASSIUM SERPL-SCNC: 3.8 MMOL/L (ref 3.6–5.5)
RBC # BLD AUTO: 4.54 M/UL (ref 4.7–6.1)
SODIUM SERPL-SCNC: 140 MMOL/L (ref 135–145)
WBC # BLD AUTO: 6.4 K/UL (ref 4.8–10.8)

## 2024-05-05 PROCEDURE — 99232 SBSQ HOSP IP/OBS MODERATE 35: CPT | Performed by: STUDENT IN AN ORGANIZED HEALTH CARE EDUCATION/TRAINING PROGRAM

## 2024-05-05 RX ADMIN — CARBIDOPA AND LEVODOPA 1 TABLET: 50; 200 TABLET, EXTENDED RELEASE ORAL at 15:00

## 2024-05-05 RX ADMIN — ENOXAPARIN SODIUM 40 MG: 100 INJECTION SUBCUTANEOUS at 17:55

## 2024-05-05 RX ADMIN — ROPINIROLE HYDROCHLORIDE 0.5 MG: 0.5 TABLET, FILM COATED ORAL at 08:11

## 2024-05-05 RX ADMIN — ROPINIROLE HYDROCHLORIDE 0.5 MG: 0.5 TABLET, FILM COATED ORAL at 20:43

## 2024-05-05 RX ADMIN — TAMSULOSIN HYDROCHLORIDE 0.4 MG: 0.4 CAPSULE ORAL at 08:11

## 2024-05-05 RX ADMIN — LOSARTAN POTASSIUM 50 MG: 50 TABLET, FILM COATED ORAL at 04:34

## 2024-05-05 RX ADMIN — CARBIDOPA AND LEVODOPA 1 TABLET: 50; 200 TABLET, EXTENDED RELEASE ORAL at 20:44

## 2024-05-05 RX ADMIN — INSULIN HUMAN 4 UNITS: 100 INJECTION, SOLUTION PARENTERAL at 20:49

## 2024-05-05 RX ADMIN — AMOXICILLIN AND CLAVULANATE POTASSIUM 1 TABLET: 875; 125 TABLET, FILM COATED ORAL at 04:34

## 2024-05-05 RX ADMIN — PRAVASTATIN SODIUM 20 MG: 20 TABLET ORAL at 20:44

## 2024-05-05 RX ADMIN — CARBIDOPA AND LEVODOPA 1 TABLET: 50; 200 TABLET, EXTENDED RELEASE ORAL at 17:55

## 2024-05-05 RX ADMIN — TRAZODONE HYDROCHLORIDE 50 MG: 50 TABLET ORAL at 20:44

## 2024-05-05 RX ADMIN — CHOLECALCIFEROL TAB 125 MCG (5000 UNIT) 5000 UNITS: 125 TAB at 04:34

## 2024-05-05 RX ADMIN — FAMOTIDINE 20 MG: 20 TABLET, FILM COATED ORAL at 08:11

## 2024-05-05 RX ADMIN — INSULIN HUMAN 4 UNITS: 100 INJECTION, SOLUTION PARENTERAL at 18:01

## 2024-05-05 RX ADMIN — DONEPEZIL HYDROCHLORIDE 10 MG: 5 TABLET, FILM COATED ORAL at 20:44

## 2024-05-05 RX ADMIN — CARBIDOPA AND LEVODOPA 1 TABLET: 50; 200 TABLET, EXTENDED RELEASE ORAL at 13:08

## 2024-05-05 RX ADMIN — MEMANTINE HYDROCHLORIDE 10 MG: 10 TABLET ORAL at 04:35

## 2024-05-05 RX ADMIN — INSULIN HUMAN 4 UNITS: 100 INJECTION, SOLUTION PARENTERAL at 08:19

## 2024-05-05 RX ADMIN — GUAIFENESIN 200 MG: 100 SOLUTION ORAL at 23:08

## 2024-05-05 RX ADMIN — CARBIDOPA AND LEVODOPA 1 TABLET: 50; 200 TABLET, EXTENDED RELEASE ORAL at 08:11

## 2024-05-05 RX ADMIN — MEMANTINE HYDROCHLORIDE 10 MG: 10 TABLET ORAL at 17:55

## 2024-05-05 RX ADMIN — FLUOXETINE HYDROCHLORIDE 10 MG: 10 CAPSULE ORAL at 17:55

## 2024-05-05 RX ADMIN — CARBIDOPA AND LEVODOPA 1 TABLET: 50; 200 TABLET, EXTENDED RELEASE ORAL at 04:34

## 2024-05-05 RX ADMIN — AMOXICILLIN AND CLAVULANATE POTASSIUM 1 TABLET: 875; 125 TABLET, FILM COATED ORAL at 17:55

## 2024-05-05 RX ADMIN — ROPINIROLE HYDROCHLORIDE 0.5 MG: 0.5 TABLET, FILM COATED ORAL at 15:00

## 2024-05-05 RX ADMIN — INSULIN HUMAN 7 UNITS: 100 INJECTION, SOLUTION PARENTERAL at 13:12

## 2024-05-05 RX ADMIN — FLUOXETINE HYDROCHLORIDE 10 MG: 10 CAPSULE ORAL at 04:35

## 2024-05-05 NOTE — PROGRESS NOTES
Hospital Medicine Daily Progress Note    Date of Service  5/5/2024    Chief Complaint  Mc Krishnamurthy is a 78 y.o. male admitted 5/1/2024 with generalized weakness and pneumonia    Hospital Course  Mc Krishnamurthy is a 78 y.o. male who presented 5/1/2024 with Parkinson's disease, type 2 diabetes mellitus, COPD and hypertension who presents to the hospital from his assisted living facility.  Patient states that he is weak and fell from his walker and landed on his right hip and had pain.  He also endorses continued and worsening shortness of breath with productive cough with greenish sputum but no blood.   In the emergency room he was found to be hypoxic on room air to 87% and was placed on oxygen supplementation.  Chest x-ray was concerning for bibasilar pneumonia was placed on empiric IV Unasyn and azithromycin.  His hip x-ray was negative for fracture.  He also was found to be persistently hyperglycemic with very mild DKA and was given multiple rounds of IV insulin in the ER.     Aox4, hands tremor    Interval Problem Update  I saw and examined the patient at bedside  I discussed the care plan with the son at bedside  Reported cough, dysuria, chronic diarrhea, abdominal pain.     On 1-2L NC   Echo EF 60  C. Diff negative    BG high, I increased Lantus to 52 units  A1c 9  Wbc 14>6    I have discussed this patient's plan of care and discharge plan at IDT rounds today with Case Management, Nursing, Nursing leadership, and other members of the IDT team.    Consultants/Specialty      Code Status  DNAR/DNI    Disposition  The patient is medically cleared for discharge to home or a post-acute facility.      I have placed the appropriate orders for post-discharge needs.    Review of Systems   All 12 systems were reviewed and negative except as mentioned above      Physical Exam  Temp:  [36.3 °C (97.3 °F)-36.7 °C (98 °F)] 36.3 °C (97.3 °F)  Pulse:  [66-75] 66  Resp:  [17-20] 17  BP: (111-130)/(68-74) 114/68  SpO2:  [88 %-95 %]  95 %    Physical Exam  Constitutional:       General: He is in acute distress.      Appearance: He is ill-appearing.   HENT:      Head: Normocephalic.      Mouth/Throat:      Mouth: Mucous membranes are moist.   Eyes:      Extraocular Movements: Extraocular movements intact.      Conjunctiva/sclera: Conjunctivae normal.   Cardiovascular:      Rate and Rhythm: Normal rate and regular rhythm.      Pulses: Normal pulses.      Heart sounds: Normal heart sounds.   Pulmonary:      Effort: Pulmonary effort is normal.      Breath sounds: Rales present.   Abdominal:      General: Bowel sounds are normal.      Palpations: Abdomen is soft.      Tenderness: There is no abdominal tenderness. There is no guarding.   Musculoskeletal:         General: No swelling or tenderness.      Cervical back: Normal range of motion and neck supple.   Skin:     General: Skin is warm.   Neurological:      Mental Status: He is alert and oriented to person, place, and time. Mental status is at baseline.   Psychiatric:         Mood and Affect: Mood normal.         Fluids    Intake/Output Summary (Last 24 hours) at 5/5/2024 1526  Last data filed at 5/5/2024 1425  Gross per 24 hour   Intake 720 ml   Output 550 ml   Net 170 ml       Laboratory  Recent Labs     05/03/24  0136 05/04/24  0313 05/05/24  0734   WBC 7.9 6.6 6.4   RBC 4.58* 4.73 4.54*   HEMOGLOBIN 14.2 14.5 14.2   HEMATOCRIT 42.5 42.8 41.7*   MCV 92.8 90.5 91.9   MCH 31.0 30.7 31.3   MCHC 33.4 33.9 34.1   RDW 44.9 43.1 43.4   PLATELETCT 141* 167 174   MPV 10.4 10.9 10.4     Recent Labs     05/03/24  0136 05/04/24  0313 05/05/24  0734   SODIUM 140 141 140   POTASSIUM 3.6 3.7 3.8   CHLORIDE 103 102 103   CO2 27 28 28   GLUCOSE 225* 191* 215*   BUN 13 16 14   CREATININE 0.56 0.68 0.73   CALCIUM 8.6 8.4* 8.9                   Imaging  EC-ECHOCARDIOGRAM COMPLETE W/ CONT   Final Result      DX-CHEST-PORTABLE (1 VIEW)   Final Result      Bilateral basilar atelectasis and/or consolidation.  Underlying infection is possible.      DX-HIP-COMPLETE - UNILATERAL 2+ RIGHT   Final Result         1.  No radiographic evidence of acute traumatic injury.           Assessment/Plan  * Acute respiratory failure with hypoxia (HCC)- (present on admission)  Assessment & Plan  Presumed 2/2 CAP  See below  O2 supplementation    Elevated proBNP 1260   Echo unremarkable, EF 60%  Received 1 dose of IV Lasix   Continue to wean down oxygen  Continue antibioticS    Hypophosphatemia  Assessment & Plan  Replaced    Diarrhea  Assessment & Plan  Reported of chronic diarrhea, some abdominal pain  C. difficile negative, I started the Imodium    Dysuria  Assessment & Plan  Reported dysuria  UA WBC 0-2, negative for bacteria  I ordered a urine culture  Patient is on Unasyn for pneumonia, continue    Right hip pain- (present on admission)  Assessment & Plan  From fall this AM  Initial Hip xray negative for obvious fracture  Some persistent pain noted on exam  If persists, low threshold get cT hip to r/o occult fracture  PT/OT eval    Atrial flutter (HCC)- (present on admission)  Assessment & Plan  EKG at admission reported aflutter; I personally reviewed EKG, my personal interpretation is  sinus rhythm  Repeated EKG today showed a sinus rhythm    No history of atrial flutter or A-fib    HR in good range    CAP (community acquired pneumonia)- (present on admission)  Assessment & Plan  Check Mrsa nares  Sputum and blood cultures  SLP eval for swallowing issues, especially in light of his long-standing Parkinson's    5/4 IV Unasyn transitioned to Augmentin        Recurrent major depressive disorder, in partial remission (HCC)- (present on admission)  Assessment & Plan  Prozac 10 mg daily    Dyslipidemia associated with type 2 diabetes mellitus (HCC)- (present on admission)  Assessment & Plan  Continue pravastatin    BMI 33.0-33.9,adult- (present on admission)  Assessment & Plan  Outpatient nutritional counseling    COPD (chronic  obstructive pulmonary disease) (HCC)- (present on admission)  Assessment & Plan  No clear exacerbation at this time  BD's and adjust prn    GERD (gastroesophageal reflux disease)- (present on admission)  Assessment & Plan  Pepcid 20 mg daily    Hypertension associated with type 2 diabetes mellitus ([facility])- (present on admission)  Assessment & Plan  Continue losartan 50 mg daily    Anxiety disorder- (present on admission)  Assessment & Plan  Continue prozac 10 mg daily    Urinary incontinence- (present on admission)  Assessment & Plan  Continue mirabegron 25 mg daily    Type 2 diabetes mellitus with diabetic polyneuropathy, with long-term current use of insulin ([facility])- (present on admission)  Assessment & Plan  Hyperglycemic in the ER, mild DKA  Insulin received in the ER  Home dose Lantus 40 units  Continue LA insulin and ISS  A1c 9  Lantus has been increased to 48 units    5/5 BG high, I increase the Lantus to 52 units, continue SSI  Hypoglycemia protocol    Parkinson's disease (HCC)- (present on admission)  Assessment & Plan  Significant hand tremor   continue outpatient sinemet         VTE prophylaxis: LOVENOX    I have performed a physical exam and reviewed and updated ROS and Plan today (5/5/2024). In review of yesterday's note (5/4/2024), there are no changes except as documented above.    I spent 36 minutes for chart review, meeting and examining the patient, documenting, ordering medications and tests, interpreting the results independently, and communicating with the other health professionals.      ROS  VTE Selection

## 2024-05-05 NOTE — CARE PLAN
The patient is Stable - Low risk of patient condition declining or worsening    Shift Goals  Clinical Goals: Juvenal BS control, heal wounds  Patient Goals: Comfort  Family Goals: ANTONIETA\    Progress made toward(s) clinical / shift goals:      Patient is not progressing towards the following goals:    Pt BS monitored and covered per orders. Pt c/o mild cough that was relieved with robitussin. Pt c/o lack of sleep; trazodone ordered, but pt slept very minimally. Pt frequently turned and repositioned as well as checked for incontinence.

## 2024-05-05 NOTE — CARE PLAN
The patient is Stable - Low risk of patient condition declining or worsening    Shift Goals  Clinical Goals: increase po intake  Patient Goals: comfrot  Family Goals: keily    Patient is not progressing towards the following goals:      Problem: Fall Risk  Goal: Patient will remain free from falls  Description: Target End Date:  Prior to discharge or change in level of care    Document interventions on the Lynda Cook Fall Risk Assessment    1.  Assess for fall risk factors  2.  Implement fall precautions  Outcome: Not Progressing

## 2024-05-06 LAB
ANION GAP SERPL CALC-SCNC: 8 MMOL/L (ref 7–16)
BUN SERPL-MCNC: 11 MG/DL (ref 8–22)
CALCIUM SERPL-MCNC: 8.8 MG/DL (ref 8.5–10.5)
CHLORIDE SERPL-SCNC: 103 MMOL/L (ref 96–112)
CO2 SERPL-SCNC: 28 MMOL/L (ref 20–33)
CREAT SERPL-MCNC: 0.73 MG/DL (ref 0.5–1.4)
ERYTHROCYTE [DISTWIDTH] IN BLOOD BY AUTOMATED COUNT: 43 FL (ref 35.9–50)
GFR SERPLBLD CREATININE-BSD FMLA CKD-EPI: 93 ML/MIN/1.73 M 2
GLUCOSE BLD STRIP.AUTO-MCNC: 209 MG/DL (ref 65–99)
GLUCOSE BLD STRIP.AUTO-MCNC: 213 MG/DL (ref 65–99)
GLUCOSE BLD STRIP.AUTO-MCNC: 241 MG/DL (ref 65–99)
GLUCOSE BLD STRIP.AUTO-MCNC: 243 MG/DL (ref 65–99)
GLUCOSE SERPL-MCNC: 210 MG/DL (ref 65–99)
HCT VFR BLD AUTO: 43.1 % (ref 42–52)
HGB BLD-MCNC: 14.4 G/DL (ref 14–18)
MCH RBC QN AUTO: 30.7 PG (ref 27–33)
MCHC RBC AUTO-ENTMCNC: 33.4 G/DL (ref 32.3–36.5)
MCV RBC AUTO: 91.9 FL (ref 81.4–97.8)
PLATELET # BLD AUTO: 179 K/UL (ref 164–446)
PMV BLD AUTO: 10.3 FL (ref 9–12.9)
POTASSIUM SERPL-SCNC: 3.9 MMOL/L (ref 3.6–5.5)
RBC # BLD AUTO: 4.69 M/UL (ref 4.7–6.1)
SODIUM SERPL-SCNC: 139 MMOL/L (ref 135–145)
WBC # BLD AUTO: 7 K/UL (ref 4.8–10.8)

## 2024-05-06 PROCEDURE — 99232 SBSQ HOSP IP/OBS MODERATE 35: CPT | Performed by: STUDENT IN AN ORGANIZED HEALTH CARE EDUCATION/TRAINING PROGRAM

## 2024-05-06 RX ORDER — TRAZODONE HYDROCHLORIDE 50 MG/1
50 TABLET ORAL
Status: SHIPPED
Start: 2024-05-06

## 2024-05-06 RX ADMIN — MEMANTINE HYDROCHLORIDE 10 MG: 10 TABLET ORAL at 05:59

## 2024-05-06 RX ADMIN — ROPINIROLE HYDROCHLORIDE 0.5 MG: 0.5 TABLET, FILM COATED ORAL at 21:14

## 2024-05-06 RX ADMIN — DONEPEZIL HYDROCHLORIDE 10 MG: 5 TABLET, FILM COATED ORAL at 21:15

## 2024-05-06 RX ADMIN — FLUOXETINE HYDROCHLORIDE 10 MG: 10 CAPSULE ORAL at 17:35

## 2024-05-06 RX ADMIN — INSULIN HUMAN 4 UNITS: 100 INJECTION, SOLUTION PARENTERAL at 08:24

## 2024-05-06 RX ADMIN — ROPINIROLE HYDROCHLORIDE 0.5 MG: 0.5 TABLET, FILM COATED ORAL at 13:08

## 2024-05-06 RX ADMIN — MEMANTINE HYDROCHLORIDE 10 MG: 10 TABLET ORAL at 17:35

## 2024-05-06 RX ADMIN — CARBIDOPA AND LEVODOPA 1 TABLET: 50; 200 TABLET, EXTENDED RELEASE ORAL at 16:03

## 2024-05-06 RX ADMIN — PRAVASTATIN SODIUM 20 MG: 20 TABLET ORAL at 21:15

## 2024-05-06 RX ADMIN — CARBIDOPA AND LEVODOPA 1 TABLET: 50; 200 TABLET, EXTENDED RELEASE ORAL at 05:58

## 2024-05-06 RX ADMIN — CARBIDOPA AND LEVODOPA 1 TABLET: 50; 200 TABLET, EXTENDED RELEASE ORAL at 08:00

## 2024-05-06 RX ADMIN — LOSARTAN POTASSIUM 50 MG: 50 TABLET, FILM COATED ORAL at 05:59

## 2024-05-06 RX ADMIN — Medication 3 MG: at 21:14

## 2024-05-06 RX ADMIN — CARBIDOPA AND LEVODOPA 1 TABLET: 50; 200 TABLET, EXTENDED RELEASE ORAL at 13:08

## 2024-05-06 RX ADMIN — CHOLECALCIFEROL TAB 125 MCG (5000 UNIT) 5000 UNITS: 125 TAB at 05:59

## 2024-05-06 RX ADMIN — ENOXAPARIN SODIUM 40 MG: 100 INJECTION SUBCUTANEOUS at 17:35

## 2024-05-06 RX ADMIN — INSULIN HUMAN 4 UNITS: 100 INJECTION, SOLUTION PARENTERAL at 17:38

## 2024-05-06 RX ADMIN — FAMOTIDINE 20 MG: 20 TABLET, FILM COATED ORAL at 08:00

## 2024-05-06 RX ADMIN — CARBIDOPA AND LEVODOPA 1 TABLET: 50; 200 TABLET, EXTENDED RELEASE ORAL at 21:14

## 2024-05-06 RX ADMIN — INSULIN HUMAN 4 UNITS: 100 INJECTION, SOLUTION PARENTERAL at 21:23

## 2024-05-06 RX ADMIN — FLUOXETINE HYDROCHLORIDE 10 MG: 10 CAPSULE ORAL at 05:58

## 2024-05-06 RX ADMIN — TAMSULOSIN HYDROCHLORIDE 0.4 MG: 0.4 CAPSULE ORAL at 13:08

## 2024-05-06 RX ADMIN — GUAIFENESIN 200 MG: 100 SOLUTION ORAL at 23:07

## 2024-05-06 RX ADMIN — INSULIN HUMAN 4 UNITS: 100 INJECTION, SOLUTION PARENTERAL at 13:13

## 2024-05-06 RX ADMIN — ROPINIROLE HYDROCHLORIDE 0.5 MG: 0.5 TABLET, FILM COATED ORAL at 08:00

## 2024-05-06 RX ADMIN — CARBIDOPA AND LEVODOPA 1 TABLET: 50; 200 TABLET, EXTENDED RELEASE ORAL at 17:35

## 2024-05-06 ASSESSMENT — COGNITIVE AND FUNCTIONAL STATUS - GENERAL
EATING MEALS: A LITTLE
DRESSING REGULAR LOWER BODY CLOTHING: TOTAL
HELP NEEDED FOR BATHING: A LOT
SUGGESTED CMS G CODE MODIFIER DAILY ACTIVITY: CL
DRESSING REGULAR UPPER BODY CLOTHING: A LOT
TOILETING: TOTAL
DAILY ACTIVITIY SCORE: 12
PERSONAL GROOMING: A LITTLE

## 2024-05-06 ASSESSMENT — PAIN DESCRIPTION - PAIN TYPE: TYPE: ACUTE PAIN

## 2024-05-06 ASSESSMENT — FIBROSIS 4 INDEX: FIB4 SCORE: 2.5

## 2024-05-06 NOTE — PROGRESS NOTES
Hospital Medicine Daily Progress Note    Date of Service  5/6/2024    Chief Complaint  Mc Krishnamurthy is a 78 y.o. male admitted 5/1/2024 with generalized weakness and pneumonia    Hospital Course  Mc Krishnamurthy is a 78 y.o. male who presented 5/1/2024 with Parkinson's disease, type 2 diabetes mellitus, COPD and hypertension who presents to the hospital from his assisted living facility.  Patient states that he is weak and fell from his walker and landed on his right hip and had pain.  He also endorses continued and worsening shortness of breath with productive cough with greenish sputum but no blood.   In the emergency room he was found to be hypoxic on room air to 87% and was placed on oxygen supplementation.  Chest x-ray was concerning for bibasilar pneumonia was placed on empiric IV Unasyn and azithromycin.  His hip x-ray was negative for fracture.  He also was found to be persistently hyperglycemic with very mild DKA and was given multiple rounds of IV insulin in the ER.     Aox4, hands tremor    Interval Problem Update  I saw and examined the patient at bedside    On 1-2L NC   Echo EF 60  C. Diff negative    Continue Lantus 52 units, continue SSI  A1c 9    I have discussed this patient's plan of care and discharge plan at IDT rounds today with Case Management, Nursing, Nursing leadership, and other members of the IDT team.    Consultants/Specialty      Code Status  DNAR/DNI    Disposition  The patient is medically cleared for discharge to home or a post-acute facility.      I have placed the appropriate orders for post-discharge needs.    Review of Systems   All 12 systems were reviewed and negative except as mentioned above      Physical Exam  Temp:  [36 °C (96.8 °F)-36.9 °C (98.4 °F)] 36.8 °C (98.2 °F)  Pulse:  [65-76] 76  Resp:  [17-18] 18  BP: (121-125)/(75-91) 122/77  SpO2:  [94 %-95 %] 95 %    Physical Exam  Constitutional:       General: He is in acute distress.      Appearance: He is ill-appearing.    HENT:      Head: Normocephalic.      Mouth/Throat:      Mouth: Mucous membranes are moist.   Eyes:      Extraocular Movements: Extraocular movements intact.      Conjunctiva/sclera: Conjunctivae normal.   Cardiovascular:      Rate and Rhythm: Normal rate and regular rhythm.      Pulses: Normal pulses.      Heart sounds: Normal heart sounds.   Pulmonary:      Effort: Pulmonary effort is normal.      Breath sounds: Rales present.   Abdominal:      General: Bowel sounds are normal.      Palpations: Abdomen is soft.      Tenderness: There is no abdominal tenderness. There is no guarding.   Musculoskeletal:         General: No swelling or tenderness.      Cervical back: Normal range of motion and neck supple.   Skin:     General: Skin is warm.   Neurological:      Mental Status: He is alert and oriented to person, place, and time. Mental status is at baseline.   Psychiatric:         Mood and Affect: Mood normal.         Fluids    Intake/Output Summary (Last 24 hours) at 5/6/2024 1513  Last data filed at 5/5/2024 2040  Gross per 24 hour   Intake 360 ml   Output --   Net 360 ml       Laboratory  Recent Labs     05/04/24  0313 05/05/24  0734 05/06/24  0721   WBC 6.6 6.4 7.0   RBC 4.73 4.54* 4.69*   HEMOGLOBIN 14.5 14.2 14.4   HEMATOCRIT 42.8 41.7* 43.1   MCV 90.5 91.9 91.9   MCH 30.7 31.3 30.7   MCHC 33.9 34.1 33.4   RDW 43.1 43.4 43.0   PLATELETCT 167 174 179   MPV 10.9 10.4 10.3     Recent Labs     05/04/24  0313 05/05/24  0734 05/06/24  0721   SODIUM 141 140 139   POTASSIUM 3.7 3.8 3.9   CHLORIDE 102 103 103   CO2 28 28 28   GLUCOSE 191* 215* 210*   BUN 16 14 11   CREATININE 0.68 0.73 0.73   CALCIUM 8.4* 8.9 8.8                   Imaging  EC-ECHOCARDIOGRAM COMPLETE W/ CONT   Final Result      DX-CHEST-PORTABLE (1 VIEW)   Final Result      Bilateral basilar atelectasis and/or consolidation. Underlying infection is possible.      DX-HIP-COMPLETE - UNILATERAL 2+ RIGHT   Final Result         1.  No radiographic evidence of  acute traumatic injury.           Assessment/Plan  * Acute respiratory failure with hypoxia (HCC)- (present on admission)  Assessment & Plan  Presumed 2/2 CAP  See below  O2 supplementation    Elevated proBNP 1260   Echo unremarkable, EF 60%  Received 1 dose of IV Lasix   Continue to wean down oxygen  Continue antibioticS    Hypophosphatemia  Assessment & Plan  Replaced    Diarrhea  Assessment & Plan  Reported of chronic diarrhea, some abdominal pain  C. difficile negative, I started the Imodium    Dysuria  Assessment & Plan  Reported dysuria  UA WBC 0-2, negative for bacteria  I ordered a urine culture  Patient is on Unasyn for pneumonia, continue    Right hip pain- (present on admission)  Assessment & Plan  From fall this AM  Initial Hip xray negative for obvious fracture  Some persistent pain noted on exam  If persists, low threshold get cT hip to r/o occult fracture  PT/OT eval    Atrial flutter (HCC)- (present on admission)  Assessment & Plan  EKG at admission reported aflutter; I personally reviewed EKG, my personal interpretation is  sinus rhythm  Repeated EKG today showed a sinus rhythm    No history of atrial flutter or A-fib    HR in good range    CAP (community acquired pneumonia)- (present on admission)  Assessment & Plan  Check Mrsa nares  Sputum and blood cultures  SLP eval for swallowing issues, especially in light of his long-standing Parkinson's    IV Unasyn transitioned to Augmentin  5/6 patient completed a course of antibiotics        Recurrent major depressive disorder, in partial remission ([facility])- (present on admission)  Assessment & Plan  Prozac 10 mg daily    Dyslipidemia associated with type 2 diabetes mellitus ([facility])- (present on admission)  Assessment & Plan  Continue pravastatin    BMI 33.0-33.9,adult- (present on admission)  Assessment & Plan  Outpatient nutritional counseling    COPD (chronic obstructive pulmonary disease) ([facility])- (present on admission)  Assessment & Plan  No clear  exacerbation at this time  BD's and adjust prn    GERD (gastroesophageal reflux disease)- (present on admission)  Assessment & Plan  Pepcid 20 mg daily    Hypertension associated with type 2 diabetes mellitus (HCC)- (present on admission)  Assessment & Plan  Continue losartan 50 mg daily    Anxiety disorder- (present on admission)  Assessment & Plan  Continue prozac 10 mg daily    Urinary incontinence- (present on admission)  Assessment & Plan  Continue mirabegron 25 mg daily    Type 2 diabetes mellitus with diabetic polyneuropathy, with long-term current use of insulin (HCC)- (present on admission)  Assessment & Plan  Hyperglycemic in the ER, mild DKA  Insulin received in the ER  Home dose Lantus 40 units  Continue LA insulin and ISS  A1c 9  Lantus has been increased to 48 units    5/5 BG high, I increase the Lantus to 52 units, continue SSI  5/6 BG improving, continue Lantus 52 units and SSI  Hypoglycemia protocol    Parkinson's disease (HCC)- (present on admission)  Assessment & Plan  Significant hand tremor   continue outpatient sinemet         VTE prophylaxis: LOVENOX    I have performed a physical exam and reviewed and updated ROS and Plan today (5/6/2024). In review of yesterday's note (5/5/2024), there are no changes except as documented above.    I spent 36 minutes for chart review, meeting and examining the patient, documenting, ordering medications and tests, interpreting the results independently, and communicating with the other health professionals.      ROS  VTE Selection

## 2024-05-06 NOTE — THERAPY
"Speech Language Pathology   Daily Treatment     Patient Name: Mc Krishnamurthy  AGE:  78 y.o., SEX:  male  Medical Record #: [id number]  Date of Service: 5/6/2024    Precautions:  Precautions: Fall Risk     Subjective  Pt seen saturating on 2L NC, A&Ox3. He denied any concerns w/ his swallowing. He only complained about his speech, 'I'm losing my speech\".      Assessment  Pt was set-up with his breakfast tray, he was then able to feed himself independently. He asked for assistance as needed (e.g., to open items). Oral stage was functional w/ no oral residue post swallow. No upper airway wetness, throat clearing or coughing was noted. Pt denied any globus sensation or other symptoms of dysphagia. He expressed a preference to be able to eat independently, and stated he could request assistance as needed.     Clinical Impressions  Signs of oropharyngeal swallowing at baseline and functional to continue a regular diet with thin/all liquids. In the context of generalized weakness, Parkinson's and COPD- pt would benefit from continuing to follow general swallow/aspiration precautions. No further ST services appear indicated at this level of care. Pt may benefit from ST f/u addressing dysarthria at next level of care.     Recommendations  Treatment Completed: Dysphagia Treatment     Dysphagia Treatment  Diet Consistency: Thin/all Liquids, Regular Solids  Instrumentation: Instrumental swallow study pending clinical progress  Medication: As tolerated  Supervision: Encourage self-feeding, Distant supervision - check on patient 2-3 times per meal, Assist with meal tray set up  Positioning: Fully upright and midline during oral intake  Risk Management : Small bites/sips, Slow rate of intake, Physical mobility, as tolerated  Oral Care: BID    SLP Treatment Plan  Treatment Plan: None Indicated  SLP Frequency: N/A - Evaluation Only  Estimated Duration: N/A - Evaluation Only    Anticipated Discharge Needs  Discharge Recommendations: " "Anticipate that the patient will have no further speech therapy needs after discharge from the hospital  Therapy Recommendations Upon DC: Not Indicated    Patient / Family Goals  Patient / Family Goal #1: \"I just need to eat vegetables\"  Goal #1 Outcome: Goal met  Short Term Goals  Short Term Goal # 1: Pt will consume a diet of regular solids and thin liquids without overt s/sx of aspiration or decline in respiratory status  Goal Outcome # 1: Goal met    SABINA Bruno  "

## 2024-05-06 NOTE — CARE PLAN
The patient is Stable - Low risk of patient condition declining or worsening    Shift Goals  Clinical Goals: wound improvement, respiratory improvement  Patient Goals: Comfort  Family Goals: keily    Progress made toward(s) clinical / shift goals:      Patient is not progressing towards the following goals:    Pt BS monitored and covered per orders. Pt c/o mild cough that was relieved with robitussin. Pt frequently turned and repositioned as well as checked for incontinence. Condom cath removed d/t excoriated penis. NAEO.

## 2024-05-06 NOTE — DISCHARGE PLANNING
Case Management Discharge Planning    Admission Date: 5/1/2024  GMLOS: 4.1  ALOS: 5    6-Clicks ADL Score: 12  6-Clicks Mobility Score: 11  PT and/or OT Eval ordered: Yes  Post-acute Referrals Ordered: Yes  Post-acute Choice Obtained: Yes  Has referral(s) been sent to post-acute provider:  Yes      Anticipated Discharge Dispo: Discharge Disposition: D/T to SNF with Medicare cert in anticipation of skilled care (03)    DME Needed: No    Action(s) Taken: LMSW requested for DPA to follow up with [facility] on insurance auth.     Addendum @ 1052: [facility] has not received auth from [facility].    Escalations Completed: None    Medically Clear: Yes    Next Steps: Pt to DC to [facility] once auth is received.     Barriers to Discharge: Pending Placement    Is the patient up for discharge tomorrow: No

## 2024-05-06 NOTE — DISCHARGE PLANNING
@1037  Agency/Facility Name: [facility]  Spoke To: Ge  Outcome: Insurance auth has not been received yet.

## 2024-05-06 NOTE — CARE PLAN
The patient is Stable - Low risk of patient condition declining or worsening    Shift Goals  Clinical Goals: q2 turn, dischareg  Patient Goals: comfort  Family Goals: keily      Patient is not progressing towards the following goals:      Problem: Fall Risk  Goal: Patient will remain free from falls  Description: Target End Date:  Prior to discharge or change in level of care    Document interventions on the Lynda Cook Fall Risk Assessment    1.  Assess for fall risk factors  2.  Implement fall precautions  Outcome: Not Progressing

## 2024-05-07 LAB
ANION GAP SERPL CALC-SCNC: 9 MMOL/L (ref 7–16)
BUN SERPL-MCNC: 12 MG/DL (ref 8–22)
CALCIUM SERPL-MCNC: 8.8 MG/DL (ref 8.5–10.5)
CHLORIDE SERPL-SCNC: 101 MMOL/L (ref 96–112)
CO2 SERPL-SCNC: 28 MMOL/L (ref 20–33)
CREAT SERPL-MCNC: 0.71 MG/DL (ref 0.5–1.4)
ERYTHROCYTE [DISTWIDTH] IN BLOOD BY AUTOMATED COUNT: 42.5 FL (ref 35.9–50)
GFR SERPLBLD CREATININE-BSD FMLA CKD-EPI: 94 ML/MIN/1.73 M 2
GLUCOSE BLD STRIP.AUTO-MCNC: 164 MG/DL (ref 65–99)
GLUCOSE BLD STRIP.AUTO-MCNC: 177 MG/DL (ref 65–99)
GLUCOSE BLD STRIP.AUTO-MCNC: 271 MG/DL (ref 65–99)
GLUCOSE BLD STRIP.AUTO-MCNC: 327 MG/DL (ref 65–99)
GLUCOSE SERPL-MCNC: 191 MG/DL (ref 65–99)
HCT VFR BLD AUTO: 40.9 % (ref 42–52)
HGB BLD-MCNC: 13.9 G/DL (ref 14–18)
MCH RBC QN AUTO: 30.8 PG (ref 27–33)
MCHC RBC AUTO-ENTMCNC: 34 G/DL (ref 32.3–36.5)
MCV RBC AUTO: 90.7 FL (ref 81.4–97.8)
PLATELET # BLD AUTO: 177 K/UL (ref 164–446)
PMV BLD AUTO: 10.4 FL (ref 9–12.9)
POTASSIUM SERPL-SCNC: 3.9 MMOL/L (ref 3.6–5.5)
RBC # BLD AUTO: 4.51 M/UL (ref 4.7–6.1)
SODIUM SERPL-SCNC: 138 MMOL/L (ref 135–145)
WBC # BLD AUTO: 8.8 K/UL (ref 4.8–10.8)

## 2024-05-07 PROCEDURE — 99232 SBSQ HOSP IP/OBS MODERATE 35: CPT | Performed by: INTERNAL MEDICINE

## 2024-05-07 RX ADMIN — FLUOXETINE HYDROCHLORIDE 10 MG: 10 CAPSULE ORAL at 05:08

## 2024-05-07 RX ADMIN — ROPINIROLE HYDROCHLORIDE 0.5 MG: 0.5 TABLET, FILM COATED ORAL at 08:50

## 2024-05-07 RX ADMIN — FAMOTIDINE 20 MG: 20 TABLET, FILM COATED ORAL at 08:50

## 2024-05-07 RX ADMIN — Medication 3 MG: at 20:53

## 2024-05-07 RX ADMIN — ENOXAPARIN SODIUM 40 MG: 100 INJECTION SUBCUTANEOUS at 17:22

## 2024-05-07 RX ADMIN — MEMANTINE HYDROCHLORIDE 10 MG: 10 TABLET ORAL at 17:21

## 2024-05-07 RX ADMIN — PRAVASTATIN SODIUM 20 MG: 20 TABLET ORAL at 20:53

## 2024-05-07 RX ADMIN — INSULIN HUMAN 3 UNITS: 100 INJECTION, SOLUTION PARENTERAL at 22:26

## 2024-05-07 RX ADMIN — INSULIN HUMAN 3 UNITS: 100 INJECTION, SOLUTION PARENTERAL at 08:55

## 2024-05-07 RX ADMIN — LOSARTAN POTASSIUM 50 MG: 50 TABLET, FILM COATED ORAL at 05:08

## 2024-05-07 RX ADMIN — ROPINIROLE HYDROCHLORIDE 0.5 MG: 0.5 TABLET, FILM COATED ORAL at 20:53

## 2024-05-07 RX ADMIN — TRAZODONE HYDROCHLORIDE 50 MG: 50 TABLET ORAL at 01:46

## 2024-05-07 RX ADMIN — CARBIDOPA AND LEVODOPA 1 TABLET: 50; 200 TABLET, EXTENDED RELEASE ORAL at 20:53

## 2024-05-07 RX ADMIN — CARBIDOPA AND LEVODOPA 1 TABLET: 50; 200 TABLET, EXTENDED RELEASE ORAL at 17:21

## 2024-05-07 RX ADMIN — TAMSULOSIN HYDROCHLORIDE 0.4 MG: 0.4 CAPSULE ORAL at 08:50

## 2024-05-07 RX ADMIN — INSULIN HUMAN 7 UNITS: 100 INJECTION, SOLUTION PARENTERAL at 17:28

## 2024-05-07 RX ADMIN — CARBIDOPA AND LEVODOPA 1 TABLET: 50; 200 TABLET, EXTENDED RELEASE ORAL at 08:50

## 2024-05-07 RX ADMIN — ROPINIROLE HYDROCHLORIDE 0.5 MG: 0.5 TABLET, FILM COATED ORAL at 15:41

## 2024-05-07 RX ADMIN — CARBIDOPA AND LEVODOPA 1 TABLET: 50; 200 TABLET, EXTENDED RELEASE ORAL at 05:08

## 2024-05-07 RX ADMIN — FLUOXETINE HYDROCHLORIDE 10 MG: 10 CAPSULE ORAL at 17:21

## 2024-05-07 RX ADMIN — CHOLECALCIFEROL TAB 125 MCG (5000 UNIT) 5000 UNITS: 125 TAB at 05:08

## 2024-05-07 RX ADMIN — DONEPEZIL HYDROCHLORIDE 10 MG: 5 TABLET, FILM COATED ORAL at 20:53

## 2024-05-07 RX ADMIN — ACETAMINOPHEN 650 MG: 325 TABLET, FILM COATED ORAL at 17:38

## 2024-05-07 RX ADMIN — CARBIDOPA AND LEVODOPA 1 TABLET: 50; 200 TABLET, EXTENDED RELEASE ORAL at 13:25

## 2024-05-07 RX ADMIN — CARBIDOPA AND LEVODOPA 1 TABLET: 50; 200 TABLET, EXTENDED RELEASE ORAL at 15:41

## 2024-05-07 RX ADMIN — MEMANTINE HYDROCHLORIDE 10 MG: 10 TABLET ORAL at 05:08

## 2024-05-07 RX ADMIN — INSULIN HUMAN 10 UNITS: 100 INJECTION, SOLUTION PARENTERAL at 13:27

## 2024-05-07 ASSESSMENT — COGNITIVE AND FUNCTIONAL STATUS - GENERAL
MOBILITY SCORE: 10
TURNING FROM BACK TO SIDE WHILE IN FLAT BAD: A LOT
CLIMB 3 TO 5 STEPS WITH RAILING: TOTAL
SUGGESTED CMS G CODE MODIFIER MOBILITY: CL
MOVING TO AND FROM BED TO CHAIR: A LOT
MOVING FROM LYING ON BACK TO SITTING ON SIDE OF FLAT BED: A LOT
STANDING UP FROM CHAIR USING ARMS: A LOT
WALKING IN HOSPITAL ROOM: TOTAL

## 2024-05-07 ASSESSMENT — PAIN DESCRIPTION - PAIN TYPE: TYPE: ACUTE PAIN

## 2024-05-07 ASSESSMENT — GAIT ASSESSMENTS: GAIT LEVEL OF ASSIST: UNABLE TO PARTICIPATE

## 2024-05-07 NOTE — PROGRESS NOTES
Hospital Medicine Daily Progress Note    Date of Service  5/7/2024    Chief Complaint  Mc Krishnamurthy is a 78 y.o. male admitted 5/1/2024 with generalized weakness and pneumonia    Hospital Course  Mc Krishnamurthy is a 78 y.o. male who presented 5/1/2024 with Parkinson's disease, type 2 diabetes mellitus, COPD and hypertension who presents to the hospital from his assisted living facility.  Patient states that he is weak and fell from his walker and landed on his right hip and had pain.  He also endorses continued and worsening shortness of breath with productive cough with greenish sputum but no blood.   In the emergency room he was found to be hypoxic on room air to 87% and was placed on oxygen supplementation.  Chest x-ray was concerning for bibasilar pneumonia was placed on empiric IV Unasyn and azithromycin.  His hip x-ray was negative for fracture.  He also was found to be persistently hyperglycemic with very mild DKA and was given multiple rounds of IV insulin in the ER.     Aox4, hands tremor    Interval Problem Update    5/7/2024- no event overnight .  Vitals remained stable.  He remains on 2 L of oxygen which is his baseline.  Glucose in the morning 191.  He has used short acting 16 units besides 52 units of glargine at bedtime.  I will split glargine to 30 units twice daily . Continue to monitor.  Pending placement. Medical cleared     I saw and examined the patient at bedside    I have discussed this patient's plan of care and discharge plan at IDT rounds today with Case Management, Nursing, Nursing leadership, and other members of the IDT team.    Consultants/Specialty      Code Status  DNAR/DNI    Disposition  The patient is medically cleared for discharge to home or a post-acute facility.      I have placed the appropriate orders for post-discharge needs.    Review of Systems   All 12 systems were reviewed and negative except as mentioned above      Physical Exam  Temp:  [36.1 °C (96.9 °F)-36.4 °C (97.6  °F)] 36.2 °C (97.1 °F)  Pulse:  [60-75] 65  Resp:  [16-18] 18  BP: (107-121)/(66-70) 121/69  SpO2:  [93 %-96 %] 96 %    Physical Exam  Constitutional:       General: He is not in acute distress.     Appearance: He is ill-appearing.   HENT:      Head: Normocephalic.      Mouth/Throat:      Mouth: Mucous membranes are moist.   Eyes:      Extraocular Movements: Extraocular movements intact.      Conjunctiva/sclera: Conjunctivae normal.   Cardiovascular:      Rate and Rhythm: Normal rate and regular rhythm.      Pulses: Normal pulses.      Heart sounds: Normal heart sounds.   Pulmonary:      Effort: Pulmonary effort is normal.      Breath sounds: Rales present.   Abdominal:      General: Bowel sounds are normal.      Palpations: Abdomen is soft.      Tenderness: There is no abdominal tenderness. There is no guarding.   Musculoskeletal:         General: No swelling or tenderness.      Cervical back: Normal range of motion and neck supple.   Skin:     General: Skin is warm.   Neurological:      Mental Status: He is alert and oriented to person, place, and time. Mental status is at baseline.   Psychiatric:         Mood and Affect: Mood normal.         Fluids    Intake/Output Summary (Last 24 hours) at 5/7/2024 1643  Last data filed at 5/7/2024 1000  Gross per 24 hour   Intake 720 ml   Output 1200 ml   Net -480 ml       Laboratory  Recent Labs     05/05/24  0734 05/06/24  0721 05/06/24  2356   WBC 6.4 7.0 8.8   RBC 4.54* 4.69* 4.51*   HEMOGLOBIN 14.2 14.4 13.9*   HEMATOCRIT 41.7* 43.1 40.9*   MCV 91.9 91.9 90.7   MCH 31.3 30.7 30.8   MCHC 34.1 33.4 34.0   RDW 43.4 43.0 42.5   PLATELETCT 174 179 177   MPV 10.4 10.3 10.4     Recent Labs     05/05/24  0734 05/06/24  0721 05/06/24  2356   SODIUM 140 139 138   POTASSIUM 3.8 3.9 3.9   CHLORIDE 103 103 101   CO2 28 28 28   GLUCOSE 215* 210* 191*   BUN 14 11 12   CREATININE 0.73 0.73 0.71   CALCIUM 8.9 8.8 8.8                   Imaging  EC-ECHOCARDIOGRAM COMPLETE W/ CONT   Final  Result      DX-CHEST-PORTABLE (1 VIEW)   Final Result      Bilateral basilar atelectasis and/or consolidation. Underlying infection is possible.      DX-HIP-COMPLETE - UNILATERAL 2+ RIGHT   Final Result         1.  No radiographic evidence of acute traumatic injury.           Assessment/Plan  * Acute respiratory failure with hypoxia (HCC)- (present on admission)  Assessment & Plan  Presumed 2/2 CAP  See below  O2 supplementation    Elevated proBNP 1260   Echo unremarkable, EF 60%  Received 1 dose of IV Lasix   Continue to wean down oxygen  Continue antibioticS    5/7-at his baseline.    Hypophosphatemia- (present on admission)  Assessment & Plan  Replaced    Diarrhea  Assessment & Plan  Reported of chronic diarrhea, some abdominal pain  C. difficile negative, I started the Imodium    Dysuria- (present on admission)  Assessment & Plan  Reported dysuria  UA WBC 0-2, negative for bacteria  I ordered a urine culture  Patient is on Unasyn for pneumonia, continue    Right hip pain- (present on admission)  Assessment & Plan  From fall this AM  Initial Hip xray negative for obvious fracture  Some persistent pain noted on exam  If persists, low threshold get cT hip to r/o occult fracture  PT/OT eval    Atrial flutter (HCC)- (present on admission)  Assessment & Plan  EKG at admission reported aflutter; I personally reviewed EKG, my personal interpretation is  sinus rhythm  Repeated EKG today showed a sinus rhythm    No history of atrial flutter or A-fib    HR in good range    CAP (community acquired pneumonia)- (present on admission)  Assessment & Plan  Check Mrsa nares  Sputum and blood cultures  SLP eval for swallowing issues, especially in light of his long-standing Parkinson's    IV Unasyn transitioned to Augmentin  5/6 patient completed a course of antibiotics        Recurrent major depressive disorder, in partial remission (HCC)- (present on admission)  Assessment & Plan  Prozac 10 mg daily    Dyslipidemia associated  with type 2 diabetes mellitus ([facility])- (present on admission)  Assessment & Plan  Continue pravastatin    BMI 33.0-33.9,adult- (present on admission)  Assessment & Plan  Outpatient nutritional counseling    COPD (chronic obstructive pulmonary disease) ([facility])- (present on admission)  Assessment & Plan  No clear exacerbation at this time  BD's and adjust prn    GERD (gastroesophageal reflux disease)- (present on admission)  Assessment & Plan  Pepcid 20 mg daily    Hypertension associated with type 2 diabetes mellitus ([facility])- (present on admission)  Assessment & Plan  Continue losartan 50 mg daily    Anxiety disorder- (present on admission)  Assessment & Plan  Continue prozac 10 mg daily    Urinary incontinence- (present on admission)  Assessment & Plan  Continue mirabegron 25 mg daily    Type 2 diabetes mellitus with diabetic polyneuropathy, with long-term current use of insulin ([facility])- (present on admission)  Assessment & Plan  Hyperglycemic in the ER, mild DKA  Insulin received in the ER  Home dose Lantus 40 units  Continue LA insulin and ISS  A1c 9  Lantus has been increased to 48 units    5/5 BG high, I increase the Lantus to 52 units, continue SSI  5/6 BG improving, continue Lantus 52 units and SSI  Hypoglycemia protocol  5/7/24-switch to glargine 30 units twice daily    Parkinson's disease ([facility])- (present on admission)  Assessment & Plan  Significant hand tremor   continue outpatient sinemet         VTE prophylaxis: LOVENOX    I have performed a physical exam and reviewed and updated ROS and Plan today (5/7/2024). In review of yesterday's note (5/6/2024), there are no changes except as documented above.    I spent 36 minutes for chart review, meeting and examining the patient, documenting, ordering medications and tests, interpreting the results independently, and communicating with the other health professionals.      Review of Systems   Unable to perform ROS: Mental acuity       SCDs/TEDs

## 2024-05-07 NOTE — THERAPY
"Occupational Therapy  Daily Treatment     Patient Name: Mc Krishnamurthy  Age:  78 y.o., Sex:  male  Medical Record #: [id number]  Today's Date: 5/6/2024     Precautions  Precautions: Fall Risk  Comments: Parkinsons    Assessment    Pt huber's good progress with functional mobility compared to initial eval and was able to perform multiple STSs and transfer to the bedside chair with Franklin and his own 4WW [facility]. Pt needs cues for upright posture each stand. Demo's fair weight shifting in static standing and with txfer but needs assist to scoot forward while seated EOB. Pt is still functioning below his baseline and would continue to benefit from post-acute placement at this time to maximize safety and independence before returning to his [facility].     Plan    Treatment Plan Status: (P) Continue Current Treatment Plan  Type of Treatment: Self Care / Activities of Daily Living, Adaptive Equipment, Manual Therapy Techniques, Neuro Re-Education / Balance, Therapeutic Exercises, Therapeutic Activity, Family / Caregiver Training  Treatment Frequency: 3 Times per Week  Treatment Duration: Until Therapy Goals Met    DC Equipment Recommendations: (P) Unable to determine at this time  Discharge Recommendations: (P) Recommend post-acute placement for additional occupational therapy services prior to discharge home    Subjective    \"This made my day.\"     Objective     05/06/24 1656   Pain   Intervention Declines   Balance   Sitting Balance (Static) Fair -   Sitting Balance (Dynamic) Poor +   Standing Balance (Static) Fair -   Standing Balance (Dynamic) Poor +   Weight Shift Sitting Poor   Weight Shift Standing Fair   Skilled Intervention Facilitation;Sequencing;Tactile Cuing;Verbal Cuing   Comments pt's own 4WW in standing   Bed Mobility    Supine to Sit Moderate Assist   Scooting Maximal Assist   Rolling Minimal Assist to Rt.   Skilled Intervention Tactile Cuing;Verbal Cuing   Comments use of bed features "   Activities of Daily Living   Upper Body Dressing Moderate Assist  (gown change)   Toileting Total Assist  (condom cath, assist for perineal hygiene)   Skilled Intervention Verbal Cuing   How much help from another person does the patient currently need...   Putting on and taking off regular lower body clothing? 1   Bathing (including washing, rinsing, and drying)? 2   Toileting, which includes using a toilet, bedpan, or urinal? 1   Putting on and taking off regular upper body clothing? 2   Taking care of personal grooming such as brushing teeth? 3   Eating meals? 3   6 Clicks Daily Activity Score 12   Functional Mobility   Sit to Stand Minimal Assist  (bed height elevated)   Bed, Chair, Wheelchair Transfer Minimal Assist  (stand step EOB to chair with 4WW)   Mobility EOB, 4-5 total STSs   Activity Tolerance   Sitting in Chair post   Sitting Edge of Bed 10+ min   Standing ~1 min each rep   Patient / Family Goals   Patient / Family Goal #1 To get stronger   Goal #1 Outcome Progressing as expected   Short Term Goals   Short Term Goal # 1 Pt will txfer to [facility] with Franklin   Goal Outcome # 1 Progressing as expected   Short Term Goal # 2 Pt will perform g/h seated EOB with setup   Goal Outcome # 2 Progressing as expected   Short Term Goal # 3 Pt will change gown with Franklin   Goal Outcome # 3 Progressing as expected   Education Group   Education Provided Activities of Daily Living;Transfers   Transfers Patient Response Patient;Acceptance;Explanation;Verbal Demonstration;Action Demonstration;Reinforcement Needed   Occupational Therapy Treatment Plan    O.T. Treatment Plan Continue Current Treatment Plan   Anticipated Discharge Equipment and Recommendations   DC Equipment Recommendations Unable to determine at this time   Discharge Recommendations Recommend post-acute placement for additional occupational therapy services prior to discharge home   Interdisciplinary Plan of Care Collaboration   IDT Collaboration with  Nursing    Patient Position at End of Therapy Seated;Chair Alarm On;Call Light within Reach;Tray Table within Reach;Phone within Reach   Collaboration Comments RN aware   Session Information   Date / Session Number  5/6 #2 (2/3, 5/8)

## 2024-05-07 NOTE — CARE PLAN
The patient is Stable - Low risk of patient condition declining or worsening    Shift Goals  Clinical Goals: Patient will remain free from fall or injuries throughout shift  Patient Goals: Sleep and rest  Family Goals: keily    Progress made toward(s) clinical / shift goals:    Patient is alert and oriented x4, able to communicate needs. Patient calls appropriately. Patient denies pain or discomfort. Given patient PRN Guifanesin for cough. Patient's bed alarm is on, bed in low position, call light within reach.

## 2024-05-07 NOTE — DISCHARGE PLANNING
Case Management Discharge Planning    Admission Date: 5/1/2024  GMLOS: 4.1  ALOS: 6    6-Clicks ADL Score: 12  6-Clicks Mobility Score: 11  PT and/or OT Eval ordered: Yes  Post-acute Referrals Ordered: Yes  Post-acute Choice Obtained: Yes  Has referral(s) been sent to post-acute provider:  Yes      Anticipated Discharge Dispo: Discharge Disposition: D/T to SNF with Medicare cert in anticipation of skilled care (03)    DME Needed: No    Action(s) Taken: LMSW spoke with [facility]. The patient's insurance authorization is still pending.     Escalations Completed: None    Medically Clear: Yes    Next Steps: LMSW to follow for placement.     Barriers to Discharge: Pending Placement    Is the patient up for discharge tomorrow: No

## 2024-05-07 NOTE — THERAPY
Physical Therapy   Initial Evaluation     Patient Name: Mc Krishnamurthy  Age:  78 y.o., Sex:  male  Medical Record #: [id number]  Today's Date: 5/7/2024     Precautions  Precautions: Fall Risk    Assessment  Patient is 78 y.o. male with a diagnosis of Parkinson's disease, type 2 diabetes mellitus, COPD and hypertension who presents to the hospital from his assisted living facility due  generalized weakness and pneumonia.  Pt states he resides at [facility], uses a 4WW for mobility in facility. Pt reports he slide from his chair and landed on the floor in his room.  Pt presenting with generalized weakness, tremors, unsteady gait from baseline. Pt  will benefit from continued inpatient as well as post acute PT prior to returning to [facility]/  services.     Plan    Physical Therapy Initial Treatment Plan   Treatment Plan : (P) Bed Mobility, Gait Training, Neuro Re-Education / Balance, Therapeutic Activities, Therapeutic Exercise, Self Care / Home Evaluation  Treatment Frequency: (P) 3 Times per Week  Duration: (P) Until Therapy Goals Met    DC Equipment Recommendations: (P) Front-Wheel Walker  Discharge Recommendations: (P) Recommend post-acute placement for additional physical therapy services prior to discharge home       Precautions   Precautions Fall Risk   Vitals   O2 (LPM) 2   O2 Delivery Device Silicone Nasal Cannula   Prior Living Situation   Prior Services Intermittent Physical Support for ADL Per Service   Housing / Facility Assisted Living Residence  ([facility])   Steps Into Home 0   Steps In Home 0   Bathroom Set up Walk In Shower;Grab Bars;Shower Chair   Equipment Owned 4-Wheel Walker;Tub / Shower Seat;Grab Bar(s) By Toilet   Lives with - Patient's Self Care Capacity Attendant / Paid Care Giver   Prior Level of Functional Mobility   Bed Mobility Independent   Transfer Status Required Assist   Ambulation Required Assist   Ambulation Distance household   Assistive Devices Used  4-Wheel Walker   Comments Pt states he slide from chair to floor at [facility], states he tried to crawl to get assistance.   History of Falls   History of Falls Yes   Date of Last Fall 05/01/24   Cognition    Level of Consciousness Alert   Ability To Follow Commands 2 Step   Comments pleasant and cooperative.   Active ROM Upper Body   Comments tremors throughout.   Strength Upper Body   Gross Strength Generalized Weakness, Equal Bilaterally.    Active ROM Lower Body    Active ROM Lower Body  WDL   Strength Lower Body   Lower Body Strength  X   Gross Strength Generalized Weakness, Equal Bilaterally   Neurological Concerns   Neurological Concerns Yes   Standing Posture During ADL's Posterior Lean;Kyphotic   Comments tremors throughout.   Other Treatments   Other Treatments Provided pt assisted to BSC due to need to urinate.   Balance Assessment   Sitting Balance (Static) Fair -   Sitting Balance (Dynamic) Poor +   Standing Balance (Static) Poor +   Standing Balance (Dynamic) Poor   Weight Shift Sitting Poor   Weight Shift Standing Poor   Comments w/4WW   Bed Mobility    Comments up in chair pre and post session.   Gait Analysis   Gait Level Of Assist Unable to Participate   Comments standing with 4WW only. Transfers only.   Functional Mobility   Sit to Stand Moderate Assist   Bed, Chair, Wheelchair Transfer Moderate Assist   Toilet Transfers Moderate Assist   Mobility chair> sit to stand> BSC.   Comments extended time and multiple attempt for sit to stand   6 Clicks Assessment - How much HELP from another person do you currently need... (If the patient hasn't done an activity recently, how much help from another person do you think he/she would need if he/she tried?)   Turning from your back to your side while in a flat bed without using bedrails? 2   Moving from lying on your back to sitting on the side of a flat bed without using bedrails? 2   Moving to and from a bed to a chair (including a wheelchair)? 2    Standing up from a chair using your arms (e.g., wheelchair, or bedside chair)? 2   Walking in hospital room? 1   Climbing 3-5 steps with a railing? 1   6 clicks Mobility Score 10   Patient / Family Goals    Patient / Family Goal #1 Get out of here please.   Short Term Goals    Short Term Goal # 1 Pt will perform bed mobility at S level from flat bed w/o rail by tx 6   Short Term Goal # 2 Pt will perform sit to stand at S level by tx 6   Short Term Goal # 3 Pt will transfer with 4WW at S level by tx 6   Short Term Goal # 4 Pt will ambulate with 4WW for 50 ft with S by tx 6   Education Group   Education Provided Role of Physical Therapist;Use of Assistive Device   Role of Physical Therapist Patient Response Patient;Acceptance;Explanation;Verbal Demonstration   Use of Assistive Device Patient Response Patient;Acceptance;Explanation;Reinforcement Needed;Action Demonstration   Physical Therapy Initial Treatment Plan    Treatment Plan  Bed Mobility;Gait Training;Neuro Re-Education / Balance;Therapeutic Activities;Therapeutic Exercise;Self Care / Home Evaluation   Treatment Frequency 3 Times per Week   Duration Until Therapy Goals Met   Problem List    Problems Impaired Bed Mobility;Impaired Transfers;Impaired Ambulation;Functional Strength Deficit;Decreased Activity Tolerance;Impaired Balance   Anticipated Discharge Equipment and Recommendations   DC Equipment Recommendations Front-Wheel Walker   Discharge Recommendations Recommend post-acute placement for additional physical therapy services prior to discharge home   Interdisciplinary Plan of Care Collaboration   IDT Collaboration with  Nursing   Patient Position at End of Therapy Seated;Tray Table within Reach;Call Light within Reach;Phone within Reach;Chair Alarm On   Collaboration Comments RN updated:  Pt assisted to BSC, given clean gown. up in chair with chair alarm post session.   Session Information   Date / Session Number  5/7-1 ( 1/4, 5/13)

## 2024-05-08 LAB
GLUCOSE BLD STRIP.AUTO-MCNC: 182 MG/DL (ref 65–99)
GLUCOSE BLD STRIP.AUTO-MCNC: 203 MG/DL (ref 65–99)
GLUCOSE BLD STRIP.AUTO-MCNC: 224 MG/DL (ref 65–99)
GLUCOSE BLD STRIP.AUTO-MCNC: 255 MG/DL (ref 65–99)

## 2024-05-08 PROCEDURE — 99231 SBSQ HOSP IP/OBS SF/LOW 25: CPT | Performed by: INTERNAL MEDICINE

## 2024-05-08 RX ADMIN — MEMANTINE HYDROCHLORIDE 10 MG: 10 TABLET ORAL at 17:54

## 2024-05-08 RX ADMIN — ENOXAPARIN SODIUM 40 MG: 100 INJECTION SUBCUTANEOUS at 17:54

## 2024-05-08 RX ADMIN — MEMANTINE HYDROCHLORIDE 10 MG: 10 TABLET ORAL at 04:36

## 2024-05-08 RX ADMIN — ROPINIROLE HYDROCHLORIDE 0.5 MG: 0.5 TABLET, FILM COATED ORAL at 16:01

## 2024-05-08 RX ADMIN — CARBIDOPA AND LEVODOPA 1 TABLET: 50; 200 TABLET, EXTENDED RELEASE ORAL at 04:36

## 2024-05-08 RX ADMIN — FLUOXETINE HYDROCHLORIDE 10 MG: 10 CAPSULE ORAL at 04:46

## 2024-05-08 RX ADMIN — PRAVASTATIN SODIUM 20 MG: 20 TABLET ORAL at 20:25

## 2024-05-08 RX ADMIN — DONEPEZIL HYDROCHLORIDE 10 MG: 5 TABLET, FILM COATED ORAL at 20:26

## 2024-05-08 RX ADMIN — FLUOXETINE HYDROCHLORIDE 10 MG: 10 CAPSULE ORAL at 17:54

## 2024-05-08 RX ADMIN — TAMSULOSIN HYDROCHLORIDE 0.4 MG: 0.4 CAPSULE ORAL at 08:34

## 2024-05-08 RX ADMIN — CARBIDOPA AND LEVODOPA 1 TABLET: 50; 200 TABLET, EXTENDED RELEASE ORAL at 17:55

## 2024-05-08 RX ADMIN — INSULIN HUMAN 4 UNITS: 100 INJECTION, SOLUTION PARENTERAL at 13:19

## 2024-05-08 RX ADMIN — ACETAMINOPHEN 650 MG: 325 TABLET, FILM COATED ORAL at 17:54

## 2024-05-08 RX ADMIN — ROPINIROLE HYDROCHLORIDE 0.5 MG: 0.5 TABLET, FILM COATED ORAL at 08:34

## 2024-05-08 RX ADMIN — CARBIDOPA AND LEVODOPA 1 TABLET: 50; 200 TABLET, EXTENDED RELEASE ORAL at 16:01

## 2024-05-08 RX ADMIN — CARBIDOPA AND LEVODOPA 1 TABLET: 50; 200 TABLET, EXTENDED RELEASE ORAL at 08:34

## 2024-05-08 RX ADMIN — LOSARTAN POTASSIUM 50 MG: 50 TABLET, FILM COATED ORAL at 04:36

## 2024-05-08 RX ADMIN — Medication 3 MG: at 20:25

## 2024-05-08 RX ADMIN — INSULIN HUMAN 3 UNITS: 100 INJECTION, SOLUTION PARENTERAL at 08:39

## 2024-05-08 RX ADMIN — ACETAMINOPHEN 650 MG: 325 TABLET, FILM COATED ORAL at 08:43

## 2024-05-08 RX ADMIN — FAMOTIDINE 20 MG: 20 TABLET, FILM COATED ORAL at 08:34

## 2024-05-08 RX ADMIN — ROPINIROLE HYDROCHLORIDE 0.5 MG: 0.5 TABLET, FILM COATED ORAL at 20:26

## 2024-05-08 RX ADMIN — INSULIN HUMAN 7 UNITS: 100 INJECTION, SOLUTION PARENTERAL at 20:40

## 2024-05-08 RX ADMIN — CHOLECALCIFEROL TAB 125 MCG (5000 UNIT) 5000 UNITS: 125 TAB at 04:36

## 2024-05-08 RX ADMIN — CARBIDOPA AND LEVODOPA 1 TABLET: 50; 200 TABLET, EXTENDED RELEASE ORAL at 20:25

## 2024-05-08 RX ADMIN — CARBIDOPA AND LEVODOPA 1 TABLET: 50; 200 TABLET, EXTENDED RELEASE ORAL at 13:23

## 2024-05-08 RX ADMIN — INSULIN HUMAN 4 UNITS: 100 INJECTION, SOLUTION PARENTERAL at 17:59

## 2024-05-08 ASSESSMENT — COGNITIVE AND FUNCTIONAL STATUS - GENERAL
SUGGESTED CMS G CODE MODIFIER DAILY ACTIVITY: CK
DAILY ACTIVITIY SCORE: 17
PERSONAL GROOMING: A LITTLE
TOILETING: A LITTLE
DRESSING REGULAR UPPER BODY CLOTHING: A LITTLE
DRESSING REGULAR LOWER BODY CLOTHING: A LOT
HELP NEEDED FOR BATHING: A LOT

## 2024-05-08 ASSESSMENT — ENCOUNTER SYMPTOMS
ABDOMINAL PAIN: 0
DIARRHEA: 0
PALPITATIONS: 0
COUGH: 0

## 2024-05-08 ASSESSMENT — PAIN DESCRIPTION - PAIN TYPE
TYPE: ACUTE PAIN
TYPE: ACUTE PAIN

## 2024-05-08 NOTE — DISCHARGE PLANNING
@1038  Agency/Facility Name: [facility]  Spoke To: Sklyar  Outcome: Has not received insurance yet.  Skylar will call to check on the auth.

## 2024-05-08 NOTE — PROGRESS NOTES
Hospital Medicine Daily Progress Note    Date of Service  5/8/2024    Chief Complaint  Mc Krishnamurthy is a 78 y.o. male admitted 5/1/2024 with generalized weakness and pneumonia    Hospital Course  Mc Krishnamurthy is a 78 y.o. male who presented 5/1/2024 with Parkinson's disease, type 2 diabetes mellitus, COPD and hypertension who presents to the hospital from his assisted living facility.  Patient states that he is weak and fell from his walker and landed on his right hip and had pain.  He also endorses continued and worsening shortness of breath with productive cough with greenish sputum but no blood.   In the emergency room he was found to be hypoxic on room air to 87% and was placed on oxygen supplementation.  Chest x-ray was concerning for bibasilar pneumonia was placed on empiric IV Unasyn and azithromycin.  His hip x-ray was negative for fracture.  He also was found to be persistently hyperglycemic with very mild DKA and was given multiple rounds of IV insulin in the ER.     Aox4, hands tremor    Interval Problem Update    5/7/2024- no event overnight .  Vitals remained stable.  He remains on 2 L of oxygen which is his baseline.  Glucose in the morning 191.  He has used short acting 16 units besides 52 units of glargine at bedtime.  I will split glargine to 30 units twice daily . Continue to monitor.  Pending placement. Medical cleared     5/8/24- no update. Eating breakfast well. No concerns.  Diabetes better controled with changes on glargine 30 U BID. Continue to monitor and titrate. Medical cleared     I saw and examined the patient at bedside    I have discussed this patient's plan of care and discharge plan at IDT rounds today with Case Management, Nursing, Nursing leadership, and other members of the IDT team.    Consultants/Specialty      Code Status  DNAR/DNI    Disposition  The patient is medically cleared for discharge to home or a post-acute facility.      I have placed the appropriate orders for  post-discharge needs.    Review of Systems   All 12 systems were reviewed and negative except as mentioned above      Physical Exam  Temp:  [36 °C (96.8 °F)-36.7 °C (98 °F)] 36.3 °C (97.3 °F)  Pulse:  [66-73] 71  Resp:  [18-19] 19  BP: (125-148)/(68-83) 134/82  SpO2:  [96 %-98 %] 97 %    Physical Exam  Constitutional:       General: He is not in acute distress.     Appearance: He is ill-appearing.   HENT:      Head: Normocephalic.      Mouth/Throat:      Mouth: Mucous membranes are moist.   Eyes:      Extraocular Movements: Extraocular movements intact.      Conjunctiva/sclera: Conjunctivae normal.   Cardiovascular:      Rate and Rhythm: Normal rate and regular rhythm.      Pulses: Normal pulses.      Heart sounds: Normal heart sounds.   Pulmonary:      Effort: Pulmonary effort is normal.      Breath sounds: Rales present.   Abdominal:      General: Bowel sounds are normal.      Palpations: Abdomen is soft.      Tenderness: There is no abdominal tenderness. There is no guarding.   Musculoskeletal:         General: No swelling or tenderness.      Cervical back: Normal range of motion and neck supple.   Skin:     General: Skin is warm.   Neurological:      Mental Status: He is alert and oriented to person, place, and time. Mental status is at baseline.   Psychiatric:         Mood and Affect: Mood normal.         Fluids    Intake/Output Summary (Last 24 hours) at 5/8/2024 1653  Last data filed at 5/8/2024 1400  Gross per 24 hour   Intake 840 ml   Output --   Net 840 ml       Laboratory  Recent Labs     05/06/24  0721 05/06/24  2356   WBC 7.0 8.8   RBC 4.69* 4.51*   HEMOGLOBIN 14.4 13.9*   HEMATOCRIT 43.1 40.9*   MCV 91.9 90.7   MCH 30.7 30.8   MCHC 33.4 34.0   RDW 43.0 42.5   PLATELETCT 179 177   MPV 10.3 10.4     Recent Labs     05/06/24  0721 05/06/24  2356   SODIUM 139 138   POTASSIUM 3.9 3.9   CHLORIDE 103 101   CO2 28 28   GLUCOSE 210* 191*   BUN 11 12   CREATININE 0.73 0.71   CALCIUM 8.8 8.8                    Imaging  EC-ECHOCARDIOGRAM COMPLETE W/ CONT   Final Result      DX-CHEST-PORTABLE (1 VIEW)   Final Result      Bilateral basilar atelectasis and/or consolidation. Underlying infection is possible.      DX-HIP-COMPLETE - UNILATERAL 2+ RIGHT   Final Result         1.  No radiographic evidence of acute traumatic injury.           Assessment/Plan  * Acute respiratory failure with hypoxia (HCC)- (present on admission)  Assessment & Plan  Presumed 2/2 CAP  See below  O2 supplementation    Elevated proBNP 1260   Echo unremarkable, EF 60%  Received 1 dose of IV Lasix   Continue to wean down oxygen  Continue antibioticS    5/7-at his baseline.    Hypophosphatemia- (present on admission)  Assessment & Plan  Replaced    Diarrhea  Assessment & Plan  Reported of chronic diarrhea, some abdominal pain  C. difficile negative, I started the Imodium    Dysuria- (present on admission)  Assessment & Plan  Reported dysuria  UA WBC 0-2, negative for bacteria  I ordered a urine culture  Patient is on Unasyn for pneumonia, continue    Right hip pain- (present on admission)  Assessment & Plan  From fall this AM  Initial Hip xray negative for obvious fracture  Some persistent pain noted on exam  If persists, low threshold get cT hip to r/o occult fracture  PT/OT eval    Atrial flutter (HCC)- (present on admission)  Assessment & Plan  EKG at admission reported aflutter; I personally reviewed EKG, my personal interpretation is  sinus rhythm  Repeated EKG today showed a sinus rhythm    No history of atrial flutter or A-fib    HR in good range    CAP (community acquired pneumonia)- (present on admission)  Assessment & Plan  Check Mrsa nares  Sputum and blood cultures  SLP eval for swallowing issues, especially in light of his long-standing Parkinson's    IV Unasyn transitioned to Augmentin  5/6 patient completed a course of antibiotics        Recurrent major depressive disorder, in partial remission (HCC)- (present on admission)  Assessment  & Plan  Prozac 10 mg daily    Dyslipidemia associated with type 2 diabetes mellitus ([facility])- (present on admission)  Assessment & Plan  Continue pravastatin    BMI 33.0-33.9,adult- (present on admission)  Assessment & Plan  Outpatient nutritional counseling    COPD (chronic obstructive pulmonary disease) ([facility])- (present on admission)  Assessment & Plan  No clear exacerbation at this time  BD's and adjust prn    GERD (gastroesophageal reflux disease)- (present on admission)  Assessment & Plan  Pepcid 20 mg daily    Hypertension associated with type 2 diabetes mellitus ([facility])- (present on admission)  Assessment & Plan  Continue losartan 50 mg daily    Anxiety disorder- (present on admission)  Assessment & Plan  Continue prozac 10 mg daily    Urinary incontinence- (present on admission)  Assessment & Plan  Continue mirabegron 25 mg daily    Type 2 diabetes mellitus with diabetic polyneuropathy, with long-term current use of insulin ([facility])- (present on admission)  Assessment & Plan  Hyperglycemic in the ER, mild DKA  Insulin received in the ER  Home dose Lantus 40 units  Continue LA insulin and ISS  A1c 9  Lantus has been increased to 48 units    5/5 BG high, I increase the Lantus to 52 units, continue SSI  5/6 BG improving, continue Lantus 52 units and SSI  Hypoglycemia protocol  5/7/24-switch to glargine 30 units twice daily    Parkinson's disease ([facility])- (present on admission)  Assessment & Plan  Significant hand tremor   continue outpatient sinemet         VTE prophylaxis: LOVENOX    I have performed a physical exam and reviewed and updated ROS and Plan today (5/8/2024). In review of yesterday's note (5/7/2024), there are no changes except as documented above.      Review of Systems   Respiratory:  Negative for cough.    Cardiovascular:  Negative for chest pain and palpitations.   Gastrointestinal:  Negative for abdominal pain and diarrhea.        enoxaparin ppx

## 2024-05-08 NOTE — CARE PLAN
The patient is Stable - Low risk of patient condition declining or worsening    Shift Goals  Clinical Goals: Patient will remain free from fall or injuries throughout shift  Patient Goals: Sleep and rest  Family Goals: keily    Progress made toward(s) clinical / shift goals:      Patient is not progressing towards the following goals:      Problem: Skin Integrity  Goal: Skin integrity is maintained or improved  Description: Target End Date:  Prior to discharge or change in level of care    Document interventions on Skin Risk/Fermin flowsheet groups and corresponding LDA    1.  Assess and monitor skin integrity, appearance and/or temperature  2.  Assess risk factors for impaired skin integrity and/or pressures ulcers  3.  Implement precautions to protect skin integrity in collaboration with interdisciplinary team  4.  Implement pressure ulcer prevention protocol if at risk for skin breakdown  5.  Confirm wound care consult if at risk for skin breakdown  6.  Ensure patient use of pressure relieving devices  (Low air loss bed, waffle overlay, heel protectors, ROHO cushion, etc)  Outcome: Not Progressing

## 2024-05-08 NOTE — THERAPY
"Occupational Therapy  Daily Treatment     Patient Name: Mc Krishnamurthy  Age:  78 y.o., Sex:  male  Medical Record #: [id number]  Today's Date: 5/8/2024     Precautions: (P) Fall Risk  Comments: Parkinsons    Assessment    Pt pleasant/motivated for therapy. Pt primarily limited by weakness, poor balance, impaired endurance impacting ADLs/mobility. He ambulated short room distances today with 4WW, ultimately limited by his O2 line and poor endurance. Recommend post acute placement. Will continue to follow.    Plan    Treatment Plan Status: (P) Continue Current Treatment Plan  Type of Treatment: Self Care / Activities of Daily Living, Adaptive Equipment, Manual Therapy Techniques, Neuro Re-Education / Balance, Therapeutic Exercises, Therapeutic Activity, Family / Caregiver Training  Treatment Frequency: 3 Times per Week  Treatment Duration: Until Therapy Goals Met    DC Equipment Recommendations: (P) Unable to determine at this time  Discharge Recommendations: (P) Recommend post-acute placement for additional occupational therapy services prior to discharge home    Subjective    \"I usually do these exercises with a 5lb weight at home\"        05/08/24 0915   Precautions   Precautions Fall Risk   Cognition    Cognition / Consciousness X   Comments pleasant/motivated   Sitting Upper Body Exercises   Sitting Upper Body Exercises Yes   Front Arm Raise 1 set of 10   Side Arm Raise 1 set of 10   Comments shoulder shrugs and neck exercises x10 reps with facilitation   Balance   Sitting Balance (Static) Fair   Sitting Balance (Dynamic) Fair -   Standing Balance (Static) Fair -   Standing Balance (Dynamic) Poor +   Weight Shift Sitting Fair   Weight Shift Standing Poor   Skilled Intervention Verbal Cuing;Sequencing   Comments with 4WW   Activities of Daily Living   Eating   (assist with set up)   Grooming Standby Assist;Seated   Lower Body Dressing Maximal Assist   Skilled Intervention Verbal Cuing   How much help from another person " does the patient currently need...   6 Clicks Daily Activity Score 17   Functional Mobility   Sit to Stand Minimal Assist  (progressed to CGA)   Mobility STS x3 short room distances with 4WW   Skilled Intervention Verbal Cuing;Tactile Cuing;Sequencing   Patient / Family Goals   Patient / Family Goal #1 To get stronger   Goal #1 Outcome Progressing as expected   Short Term Goals   Short Term Goal # 1 Pt will txfer to BSC with [place]   Goal Outcome # 1 Progressing as expected   Short Term Goal # 2 Pt will perform g/h seated EOB with setup   Goal Outcome # 2 Progressing as expected   Short Term Goal # 3 Pt will change gown with [place]   Goal Outcome # 3 Progressing as expected   Education Group   Role of Occupational Therapist Patient Response Patient;Acceptance;Explanation;Verbal Demonstration   Occupational Therapy Treatment Plan    O.T. Treatment Plan Continue Current Treatment Plan   Anticipated Discharge Equipment and Recommendations   DC Equipment Recommendations Unable to determine at this time   Discharge Recommendations Recommend post-acute placement for additional occupational therapy services prior to discharge home   Interdisciplinary Plan of Care Collaboration   IDT Collaboration with  Nursing   Patient Position at End of Therapy Seated;Call Light within Reach;Tray Table within Reach;Phone within Reach   Collaboration Comments RN aware

## 2024-05-08 NOTE — DISCHARGE PLANNING
Case Management Discharge Planning    Admission Date: 5/1/2024  GMLOS: 4.1  ALOS: 7    6-Clicks ADL Score: 12  6-Clicks Mobility Score: 10  PT and/or OT Eval ordered: Yes  Post-acute Referrals Ordered: Yes  Post-acute Choice Obtained: Yes  Has referral(s) been sent to post-acute provider:  Yes      Anticipated Discharge Dispo: Discharge Disposition: D/T to SNF with Medicare cert in anticipation of skilled care (03)    DME Needed: No    Action(s) Taken: LMSW requested DPA to follow up on insurance auth at [facility] for [facility]. DPA left  for admissions at [facility].     Escalations Completed: None    Medically Clear: Yes    Next Steps: Pending Insurance Auth for placement.     Barriers to Discharge: Pending Insurance Authorization    Is the patient up for discharge tomorrow: No

## 2024-05-09 VITALS
TEMPERATURE: 96.6 F | WEIGHT: 240.08 LBS | BODY MASS INDEX: 34.37 KG/M2 | DIASTOLIC BLOOD PRESSURE: 71 MMHG | HEIGHT: 70 IN | HEART RATE: 64 BPM | SYSTOLIC BLOOD PRESSURE: 125 MMHG | OXYGEN SATURATION: 96 % | RESPIRATION RATE: 18 BRPM

## 2024-05-09 LAB
GLUCOSE BLD STRIP.AUTO-MCNC: 156 MG/DL (ref 65–99)
GLUCOSE BLD STRIP.AUTO-MCNC: 177 MG/DL (ref 65–99)
GLUCOSE BLD STRIP.AUTO-MCNC: 245 MG/DL (ref 65–99)

## 2024-05-09 PROCEDURE — 99239 HOSP IP/OBS DSCHRG MGMT >30: CPT | Performed by: INTERNAL MEDICINE

## 2024-05-09 RX ORDER — INSULIN GLARGINE 100 [IU]/ML
30 INJECTION, SOLUTION SUBCUTANEOUS 2 TIMES DAILY
Status: ACTIVE | DISCHARGE
Start: 2024-05-09

## 2024-05-09 RX ADMIN — CARBIDOPA AND LEVODOPA 1 TABLET: 50; 200 TABLET, EXTENDED RELEASE ORAL at 15:50

## 2024-05-09 RX ADMIN — MEMANTINE HYDROCHLORIDE 10 MG: 10 TABLET ORAL at 06:00

## 2024-05-09 RX ADMIN — CARBIDOPA AND LEVODOPA 1 TABLET: 50; 200 TABLET, EXTENDED RELEASE ORAL at 12:38

## 2024-05-09 RX ADMIN — ROPINIROLE HYDROCHLORIDE 0.5 MG: 0.5 TABLET, FILM COATED ORAL at 15:50

## 2024-05-09 RX ADMIN — FLUOXETINE HYDROCHLORIDE 10 MG: 10 CAPSULE ORAL at 04:45

## 2024-05-09 RX ADMIN — CHOLECALCIFEROL TAB 125 MCG (5000 UNIT) 5000 UNITS: 125 TAB at 04:45

## 2024-05-09 RX ADMIN — INSULIN HUMAN 4 UNITS: 100 INJECTION, SOLUTION PARENTERAL at 12:41

## 2024-05-09 RX ADMIN — CARBIDOPA AND LEVODOPA 1 TABLET: 50; 200 TABLET, EXTENDED RELEASE ORAL at 04:45

## 2024-05-09 RX ADMIN — ACETAMINOPHEN 650 MG: 325 TABLET, FILM COATED ORAL at 00:38

## 2024-05-09 RX ADMIN — CARBIDOPA AND LEVODOPA 1 TABLET: 50; 200 TABLET, EXTENDED RELEASE ORAL at 08:20

## 2024-05-09 RX ADMIN — ROPINIROLE HYDROCHLORIDE 0.5 MG: 0.5 TABLET, FILM COATED ORAL at 08:20

## 2024-05-09 RX ADMIN — ACETAMINOPHEN 650 MG: 325 TABLET, FILM COATED ORAL at 10:14

## 2024-05-09 RX ADMIN — INSULIN HUMAN 3 UNITS: 100 INJECTION, SOLUTION PARENTERAL at 08:56

## 2024-05-09 RX ADMIN — TRAZODONE HYDROCHLORIDE 50 MG: 50 TABLET ORAL at 00:38

## 2024-05-09 RX ADMIN — FAMOTIDINE 20 MG: 20 TABLET, FILM COATED ORAL at 08:20

## 2024-05-09 RX ADMIN — TAMSULOSIN HYDROCHLORIDE 0.4 MG: 0.4 CAPSULE ORAL at 08:20

## 2024-05-09 RX ADMIN — LOSARTAN POTASSIUM 50 MG: 50 TABLET, FILM COATED ORAL at 04:45

## 2024-05-09 ASSESSMENT — PAIN DESCRIPTION - PAIN TYPE
TYPE: ACUTE PAIN
TYPE: ACUTE PAIN

## 2024-05-09 NOTE — DISCHARGE SUMMARY
Discharge Summary    CHIEF COMPLAINT ON ADMISSION  Chief Complaint   Patient presents with    [id number] GLF       Reason for Admission  EMS    Admission Date  5/1/2024     CODE STATUS  DNAR/DNI    HPI & HOSPITAL COURSE  78 y.o. male who presented 5/1/2024 with Parkinson's disease, type 2 diabetes mellitus, COPD and hypertension who presents to the hospital from his assisted living facility. Patient states that he is weak and fell from his walker and landed on his right hip and had pain. He also endorses continued and worsening shortness of breath with productive cough with greenish sputum but no blood. In the emergency room he was found to be hypoxic on room air to 87% and was placed on oxygen supplementation. Chest x-ray was concerning for bibasilar pneumonia was placed on empiric IV Unasyn and azithromycin. His hip x-ray was negative for fracture. He also was found to be persistently hyperglycemic with very mild DKA and was given multiple rounds of IV insulin in the ER.   During his hospital stay insulin glargine was changed to 30 units twice daily  Patient is okay to resume metformin.  Continue to monitor titrate properly    In regards acute respiratory failure is resolved and patient is back to his baseline.  Echocardiogram completed on this admission showed ejection fraction 60%    He does report of right hip pain x-ray was done and there was no occult fracture.    He was also treated for urinary tract infection with Unasyn which was initiated initially for community acquired pneumonia.    Rest of chronic problems are stable  Hypertension-continue losartan 50 mg daily  Parkinson disease-patient has significant hand tremor. continue outpatient Sinemet  Recurrent major depressive disorder-continue Prozac 10 mg daily  COPD-on chronic 2 L of oxygen   Urinary incontinence-continue mirabegron 25 mg daily  GERD-continue Pepcid 20 mg daily      Therefore, he is discharged in fair and stable condition to skilled nursing  facility.    The patient met 2-midnight criteria for an inpatient stay at the time of discharge.      FOLLOW UP ITEMS POST DISCHARGE  Follow-up with primary care in 2 weeks    DISCHARGE DIAGNOSES  Principal Problem:    Acute respiratory failure with hypoxia (HCC) (POA: Yes)  Active Problems:    Parkinson's disease (HCC) (POA: Yes)    Type 2 diabetes mellitus with diabetic polyneuropathy, with long-term current use of insulin (HCC) (POA: Yes)    Urinary incontinence (POA: Yes)    Anxiety disorder (POA: Yes)    Hypertension associated with type 2 diabetes mellitus (HCC) (POA: Yes)    GERD (gastroesophageal reflux disease) (POA: Yes)    COPD (chronic obstructive pulmonary disease) (HCC) (POA: Yes)    BMI 33.0-33.9,adult (POA: Yes)    Dyslipidemia associated with type 2 diabetes mellitus (HCC) (POA: Yes)    Recurrent major depressive disorder, in partial remission (HCC) (POA: Yes)    CAP (community acquired pneumonia) (POA: Yes)    Atrial flutter (HCC) (POA: Yes)    Right hip pain (POA: Yes)    Dysuria (POA: Yes)    Diarrhea (POA: Unknown)    Hypophosphatemia (POA: Yes)  Resolved Problems:    * No resolved hospital problems. *      FOLLOW UP  No future appointments.  [address] [address]  [phone]          MEDICATIONS ON DISCHARGE     Medication List        START taking these medications        Instructions   traZODone 50 MG Tabs  Commonly known as: Desyrel   Take 1 Tablet by mouth at bedtime as needed for Sleep.  Dose: 50 mg            CHANGE how you take these medications        Instructions   acetaminophen 325 MG Tabs  What changed:   medication strength  how much to take  when to take this  reasons to take this  additional instructions  Commonly known as: Tylenol   Take 2 Tablets by mouth every 6 hours as needed for Moderate Pain or Mild Pain.  Dose: 650 mg     Antacid Ultra Strength 1000 MG Chew  What changed: See the new instructions.  Generic drug: Calcium  Carbonate Antacid   TAKE 2 TABLETS (2000MG) BY MOUTH THREE TIMES A DAY  AS NEEDED FOR DYSPEPSIA     insulin glargine 100 UNIT/ML injection PEN  What changed:   how much to take  when to take this  Generic drug: insulin glargine   Inject 30 Units under the skin 2 times a day.  Dose: 30 Units            CONTINUE taking these medications        Instructions   albuterol 108 (90 Base) MCG/ACT Aers inhalation aerosol   Inhale 2 Puffs every four hours as needed for Shortness of Breath.  Dose: 2 Puff     BD Sharps  XL Misc   1 Container every 30 days.  Dose: 1 Container     * carbidopa-levodopa SR  MG per tablet  Commonly known as: Sinemet CR   Take 1 Tablet by mouth at bedtime.  Dose: 1 Tablet     * Rytary 48. MG Cpcr  Generic drug: Carbidopa-Levodopa ER   Take 3 Capsules by mouth 4 times a day. Administer 6am, 10am, 2pm, and 6pm  Dose: 3 Capsule     carboxymethylcellulose 0.5 % Soln  Commonly known as: Refresh Tears   Administer 1 Drop into both eyes 2 times a day.  Dose: 1 Drop     donepezil 10 MG tablet  Commonly known as: Aricept   Take 1 Tablet by mouth every evening.  Dose: 10 mg     famotidine 20 MG Tabs  Commonly known as: Pepcid   Take 1 Tablet by mouth every morning before breakfast.  Dose: 20 mg     FLUoxetine 10 MG Caps  Commonly known as: PROzac   TAKE 1 CAPSULE BY MOUTH TWICE DAILY FOR DEPRESSION     FreeStyle Jose Roberto 2 [place] Brittani   Apply 1 Each topically continuous.  Dose: 1 Each     FreeStyle Jose Roberto 2 Sensor Misc   1 Each every 14 days.  Dose: 1 Each     GLUCERNA ADVANCE SHAKE PO   Take 1 Can by mouth 1 time a day as needed (FOR MISSED MEALS OR SNACKS).  Dose: 1 Can     glucose 4 GM chewable tablet   Chew 1 Tablet as needed for Low Blood Sugar.  Dose: 4 g     ketoconazole 2 % shampoo  Commonly known as: Nizoral   Apply  topically every 48 hours. APPLY TO SCALP, FACE AND GROIN, LET SIT FOR 3 TO 5 MINUTES THEN RINSE. ONCE IMPROVED, CONTINUE ONCE WEEKLY AS MAINTENANCE     losartan 50 MG  Tabs  Commonly known as: Cozaar   Take 50 mg by mouth every day.  Dose: 50 mg     melatonin 3 MG Tabs   Take 1 Tablet by mouth at bedtime.  Dose: 3 mg     memantine 10 MG Tabs  Commonly known as: Namenda   Take 10 mg by mouth 2 times a day.  Dose: 10 mg     metFORMIN 500 MG Tabs  Commonly known as: Glucophage   Take 1,000 mg by mouth 2 times a day.  Dose: 1,000 mg     Multivitamin Tabs   Doctor's comments: Ghislaine is no longer the pt's PCP. [facility]  TAKE 1 TABLET BY MOUTH ONCE DAILY     nystatin 888482 UNIT/GM Crea topical cream  Commonly known as: Mycostatin   Apply 1 Application  topically 2 times a day. Apply to scrotum for skin infection  Dose: 1 Application      omeprazole 20 MG delayed-release capsule  Commonly known as: PriLOSEC   Take 20 mg by mouth every morning.  Dose: 20 mg     polyethylene glycol 3350 17 GM/SCOOP Powd  Commonly known as: Miralax   TAKE 17GM MIXED IN 8OZ WATER AND DRINK BY MOUTH ONCE DAILY AS NEEDED FOR CONSTIPATION     pravastatin 20 MG Tabs  Commonly known as: Pravachol   Take 1 Tablet by mouth every day.  Dose: 20 mg     Probiotic Daily Caps   Take 1 Capsule by mouth every day.  Dose: 1 Capsule     ROPINIRole 0.25 MG Tabs  Commonly known as: Requip   Take 2 Tablets by mouth 3 times a day.  Dose: 0.5 mg     tamsulosin 0.4 MG capsule  Commonly known as: Flomax   Take 1 Capsule by mouth 1/2 hour after breakfast.  Dose: 0.4 mg     triamcinolone acetonide 0.1 % Crea  Commonly known as: Kenalog   Apply 1 Application topically every 12 hours as needed (FOR RASH ON NECK AND BUTTOCKS).  Dose: 1 Application      UltiCare Micro Pen Needles  Generic drug: Insulin Pen Needle 32 G x 4 mm   USE AS DIRECTED     vitamin D3 125 MCG (5000 UT) Caps   Take 1 Capsule by mouth every day.  Dose: 1 Capsule           * This list has 2 medication(s) that are the same as other medications prescribed for you. Read the directions carefully, and ask your doctor or other care  provider to review them with you.                  Allergies  No Known Allergies    DIET  Orders Placed This Encounter   Procedures    Diet Order Diet: Consistent CHO (Diabetic) (Set-up Assist for Meals, then check on pt 2-3x per meal); Tray Modifications (optional): SLP - Deliver to Nursing Station     Standing Status:   Standing     Number of Occurrences:   1     Order Specific Question:   Diet:     Answer:   Consistent CHO (Diabetic) [4]     Comments:   Set-up Assist for Meals, then check on pt 2-3x per meal     Order Specific Question:   Tray Modifications (optional)     Answer:   SLP - Deliver to Nursing Station       ACTIVITY  As tolerated and directed by skilled nursing.  Weight bearing as tolerated    LINES, DRAINS, AND WOUNDS  This is an automated list. Peripheral IVs will be removed prior to discharge.  Peripheral IV 05/01/24 18 G Anterior;Left;Proximal Forearm (Active)   Site Assessment Clean;Dry;Intact 05/08/24 2000   Dressing Type Transparent Film 05/08/24 2000   Line Status Saline locked 05/08/24 2000   Dressing Status Clean;Dry;Intact 05/08/24 2000   Dressing Intervention N/A 05/08/24 2000   Dressing Change Due 05/04/24 05/04/24 0900   Infiltration Grading ([facility], [facility]) 0 05/08/24 2000   Phlebitis Scale ([facility] Only) 0 05/08/24 2000     External Urinary Catheter ([facility]) (Active)   Collection Container Standard drainage bag 05/08/24 1400   Output (mL) 800 mL 05/06/24 2100      Moisture Associated Skin Damage 11/15/22 Groin (Active)       Wound 11/15/22 Partial Thickness Wound Coccyx;Buttocks Bilateral Friction-related (Active)       Wound 05/01/24 Pressure Injury Sacrum (Active)   Wound Image    05/02/24 1700   Site Assessment Red;Excoriated 05/08/24 0900   Periwound Assessment Clean;Dry;Intact 05/08/24 2000   Margins Defined edges 05/08/24 0900   Closure Secondary intention 05/07/24 2100   Drainage Amount Scant 05/07/24 2100   Drainage Description Serosanguineous 05/07/24 2100   Treatments Site  care;Offloading 05/07/24 2100   Wound Cleansing Approved Wound Cleanser 05/08/24 0900   Periwound Protectant Barrier Paste 05/08/24 0900   Dressing Status Clean;Dry;Intact 05/08/24 2000   Dressing Changed Observed 05/08/24 2000   Dressing Options Offloading Dressing - Sacral 05/07/24 2100   Dressing Change/Treatment Frequency Every Shift, and As Needed 05/08/24 0900   NEXT Dressing Change/Treatment Date 05/06/24 05/06/24 2100   NEXT Weekly Photo (Inpatient Only) 05/09/24 05/02/24 1700   Wound Team Following Other (comment) 05/02/24 1700   Wound Length (cm) 3 cm 05/02/24 1700   Wound Width (cm) 3 cm 05/02/24 1700   Wound Depth (cm) 0.1 cm 05/02/24 1700   Wound Surface Area (cm^2) 9 cm^2 05/02/24 1700   Wound Volume (cm^3) 0.9 cm^3 05/02/24 1700   Wound Bed Granulation (%) 100 % 05/02/24 1700   Wound Odor None 05/02/24 1700   WOUND NURSE ONLY - Time Spent with Patient (mins) 60 05/02/24 1700       Peripheral IV 05/01/24 18 G Anterior;Left;Proximal Forearm (Active)   Site Assessment Clean;Dry;Intact 05/08/24 2000   Dressing Type Transparent Film 05/08/24 2000   Line Status Saline locked 05/08/24 2000   Dressing Status Clean;Dry;Intact 05/08/24 2000   Dressing Intervention N/A 05/08/24 2000   Dressing Change Due 05/04/24 05/04/24 0900   Infiltration Grading ([facility], [facility]) 0 05/08/24 2000   Phlebitis Scale ([facility] Only) 0 05/08/24 2000               MENTAL STATUS ON TRANSFER  Alert and oriented x 2          CONSULTATIONS  None    PROCEDURES  None    LABORATORY  Lab Results   Component Value Date    SODIUM 138 05/06/2024    POTASSIUM 3.9 05/06/2024    CHLORIDE 101 05/06/2024    CO2 28 05/06/2024    GLUCOSE 191 (H) 05/06/2024    BUN 12 05/06/2024    CREATININE 0.71 05/06/2024        Lab Results   Component Value Date    WBC 8.8 05/06/2024    HEMOGLOBIN 13.9 (L) 05/06/2024    HEMATOCRIT 40.9 (L) 05/06/2024    PLATELETCT 177 05/06/2024        Total time of the discharge process exceeds 33 minutes.   Detail Level: Detailed Plan: Treated with liquid nitrogen in office today

## 2024-05-09 NOTE — DISCHARGE PLANNING
DC Transport Scheduled    Transport Company Scheduled:  [employer]  Spoke with Sarah at [facility] to schedule transport.    Scheduled Date: 5/9/2024  Scheduled Time: 1630    Destination: [facility]   Destination address: [place] [address]    Notified care team of scheduled transport via [employer].     If there are any changes needed to the DC transportation scheduled, please contact [facility] at ext. [phone] between the hours of 2772-7431 Mon-Fri. If outside those hours, contact the ED Case Manager at ext. [phone].

## 2024-05-09 NOTE — DISCHARGE INSTRUCTIONS
Acute Respiratory Failure, Adult  Acute respiratory failure is when one or both of these things happen:  Oxygen cannot pass from the lungs into the blood, causing the blood oxygen level to drop. Loss of blood oxygen means tissues and organs may not work well.  A gas called carbon dioxide cannot pass from the blood into the lungs so the body can get rid of it. The buildup of carbon dioxide can damage the tissues and organs in the body.  Acute respiratory failure happens fast. It is an emergency and needs to be treated right away.  What are the causes?  Common causes of respiratory failure that may cause low oxygen levels include:  Trauma to the lung, chest, or ribs, or to the tissues around the lung.  Lung conditions, such as pneumonia, asthma, or blood clots in the lungs (pulmonary embolism).  Breathing in harmful chemicals, smoke, water, or vomit.  A widespread infection (sepsis).  Heart attack.  Common causes of respiratory failure that cause a buildup of carbon dioxide include:  Stroke.  A spinal cord injury.  Drug or alcohol overdose.  Sepsis.  The heart stopping all of a sudden (cardiac arrest).  What increases the risk?  This condition is more likely to develop in people who have:  Known lung conditions, such as asthma or chronic obstructive pulmonary disease (COPD).  A condition that damages or weakens the muscles, nerves, bones, or tissues that are involved in breathing, such as myasthenia gravis or Guillain-Barré syndrome.  A health problem that blocks the unconscious reflex that is involved in breathing, such as hypothyroidism or sleep apnea.  What are the signs or symptoms?  Symptoms may depend on the cause and on the levels of oxygen and carbon dioxide in your blood.   Trouble breathing is the main symptom of acute respiratory failure.  Other symptoms may include:  Fast breathing.  Making high-pitched whistling sounds when you breathe (wheezing) and grunting.  Confusion or changes in behavior.  Feeling  tired, sleeping more than normal, or being hard to wake.  Skin, lips, or fingernails that look blue (cyanosis).  Feeling restless or anxious.  Fast or irregular heartbeats (palpitations).  How is this diagnosed?  This condition may be diagnosed based on:  Your medical history and a physical exam. Your health care provider will listen to your heart and lungs to check for abnormal sounds.  Tests to confirm the diagnosis and find the cause of respiratory failure. Tests may include:  Measuring the amount of oxygen in your blood (pulse oximetry). This involves placing a small device on your finger, earlobe, or toe.  Blood tests to measure levels of blood oxygen and carbon dioxide.  Chest X-ray.  How is this treated?  Treatment for this condition usually takes place in a hospital intensive care unit (ICU). Treatment depends on the cause of the condition. Treatment may include one or more of these:  Oxygen given through your nose or a face mask.  A device to help you breathe, such as a continuous positive airway pressure (CPAP) machine or bilevel positive airway pressure (BIPAP) machine. The device gives you oxygen and pressure.  Other breathing treatments, fluids, and medicines.  A breathing machine called a ventilator. This gives you oxygen and pressure to help you breathe. A tube is put into your mouth and windpipe (trachea) and connects to the ventilator.  Tracheostomy placement, if you are on a ventilator for a long time. A tracheostomy is a breathing tube put through your neck into your trachea.  Follow these instructions at home:  Medicines  Take over-the-counter and prescription medicines only as told by your health care provider.  If you were prescribed antibiotics, take them as told by your health care provider. Do not stop using the antibiotic even if you start to feel better.  General instructions  Return to your normal activities as told by your health care provider. Ask your health care provider what  activities are safe for you.  Do not use any products that contain nicotine or tobacco. These products include cigarettes, chewing tobacco, and vaping devices, such as e-cigarettes. If you need help quitting, ask your health care provider.  Keep all follow-up visits.  Contact a health care provider if:  Your symptoms do not improve or they get worse.  Get help right away if:  You are having trouble breathing.  You lose consciousness.  Your heart starts beating very fast.  Your fingers, lips, or other areas of your body turn blue.  You are confused.  These symptoms may be an emergency. Get help right away. Call 911.  Do not wait to see if the symptoms will go away.  Do not drive yourself to the hospital.  Summary  Acute respiratory failure is a condition that develops fast and needs to be treated right away.  The main symptom of this condition is trouble breathing.  Treatment for this condition usually takes place in a hospital intensive care unit (ICU). Treatment may include oxygen, fluids, and medicines. A machine may be used to help you breathe, such as a ventilator.  Contact a health care provider if your symptoms do not improve or if they get worse.  This information is not intended to replace advice given to you by your health care provider. Make sure you discuss any questions you have with your health care provider.  Document Revised: 02/24/2023 Document Reviewed: 02/24/2023  Elsevier Patient Education © 2023 [employer] Inc[employer]

## 2024-05-09 NOTE — DISCHARGE PLANNING
@0923  Agency/Facility Name: [facility]  Spoke To: Ge  Outcome: DPA called to see if insurance auth has been received.  Per Ge, not yet.  Skylar is going to call insurance company regarding auth.    @[id number]  Agency/Facility Name: [facility]  Spoke To: Skylar  Outcome: Insurance auth has been received.  [facility] does have a bed available today.  Requested transport time is 1630.    @[id number]  DPA notified Skylar at [facility] that the pt will transport at 1630 via [employer].

## 2024-05-09 NOTE — CARE PLAN
The patient is Stable - Low risk of patient condition declining or worsening    Shift Goals  Clinical Goals: Patient will remain free from falls this shift.  Patient Goals: Rest  Family Goals: ANTONIETA      Patient is a/o x3, not oriented to time. Pt was up to chair for dinner. Pt stated he was in pain earlier, pain rate of 3/10. Gave tylenol. Patient resting comfortably. Respirations are even and unlabored. Bed in lowest position. Call light and personal belongings are within reach.     Progress made toward(s) clinical / shift goals:    Problem: Pain - Standard  Goal: Alleviation of pain or a reduction in pain to the patient’s comfort goal  Outcome: Progressing     Problem: Knowledge Deficit - Standard  Goal: Patient and family/care givers will demonstrate understanding of plan of care, disease process/condition, diagnostic tests and medications  Outcome: Progressing     Problem: Skin Integrity  Goal: Skin integrity is maintained or improved  Outcome: Progressing     Problem: Fall Risk  Goal: Patient will remain free from falls  Outcome: Progressing

## 2024-05-09 NOTE — CARE PLAN
The patient is Stable - Low risk of patient condition declining or worsening    Shift Goals  Clinical Goals: Patient will get out of bed for all meals and be free from falls  Patient Goals: rest and comfort  Family Goals: ANTONIETA    [facility] is [facility] at 16:30    Progress made toward(s) clinical / shift goals:        Problem: Skin Integrity  Goal: Skin integrity is maintained or improved this shift  Outcome: Progressing     Problem: Fall Risk  Goal: Patient will remain free from falls this shift  Outcome: Progressing       Patient is not progressing towards the following goals:

## 2024-05-09 NOTE — DISCHARGE PLANNING
Case Management Discharge Planning    Admission Date: 5/1/2024  GMLOS: 4.1  ALOS: 8    6-Clicks ADL Score: 17  6-Clicks Mobility Score: 10  PT and/or OT Eval ordered: Yes  Post-acute Referrals Ordered: Yes  Post-acute Choice Obtained: Yes  Has referral(s) been sent to post-acute provider:  Yes      Anticipated Discharge Dispo: Discharge Disposition: D/T to SNF with Medicare cert in anticipation of skilled care (03)    DME Needed: No    Action(s) Taken: LMSW updated by DPA that [facility] received auth for this patient. [facility] requesting a 1630 transport time. LMSW notified the IDT via Voalte. Transport requested. LMSW to complete COBRA. LMSW met with pt at bedside to complete IMM. IMM scanned to DPA.      Escalations Completed: None    Medically Clear: Yes    Next Steps: Pt to DC to [facility].    Barriers to Discharge: Transportation    Is the patient up for discharge tomorrow: No

## 2024-05-09 NOTE — CARE PLAN
The patient is Stable - Low risk of patient condition declining or worsening    Shift Goals  Clinical Goals: Patient will remain free of falls this shift  Patient Goals: rest and coffee  Family Goals: keily    Progress made toward(s) clinical / shift goals:    Problem: Fall Risk  Goal: Patient will remain free from falls  Description: Target End Date:  Prior to discharge or change in level of care    Document interventions on the Lynda Cook Fall Risk Assessment    1.  Assess for fall risk factors  2.  Implement fall precautions  Outcome: Progressing  Note: No falls this shift, bed in lowest position and locked, non slip socks on and bed alarm on.          Patient is not progressing towards the following goals:      Problem: Skin Integrity  Goal: Skin integrity is maintained or improved  Description: Target End Date:  Prior to discharge or change in level of care    Document interventions on Skin Risk/Fermin flowsheet groups and corresponding LDA    1.  Assess and monitor skin integrity, appearance and/or temperature  2.  Assess risk factors for impaired skin integrity and/or pressures ulcers  3.  Implement precautions to protect skin integrity in collaboration with interdisciplinary team  4.  Implement pressure ulcer prevention protocol if at risk for skin breakdown  5.  Confirm wound care consult if at risk for skin breakdown  6.  Ensure patient use of pressure relieving devices  (Low air loss bed, waffle overlay, heel protectors, ROHO cushion, etc)  Outcome: Not Progressing  Note: Patient incont. And barrier paste applied multiple times this shift

## 2025-07-17 ENCOUNTER — APPOINTMENT (OUTPATIENT)
Dept: RADIOLOGY | Facility: MEDICAL CENTER | Age: 80
End: 2025-07-17
Attending: EMERGENCY MEDICINE
Payer: COMMERCIAL

## 2025-07-17 ENCOUNTER — HOSPITAL ENCOUNTER (EMERGENCY)
Facility: MEDICAL CENTER | Age: 80
End: 2025-07-18
Attending: EMERGENCY MEDICINE
Payer: COMMERCIAL

## 2025-07-17 DIAGNOSIS — B37.2 YEAST INFECTION OF THE SKIN: ICD-10-CM

## 2025-07-17 DIAGNOSIS — N39.0 URINARY TRACT INFECTION WITHOUT HEMATURIA, SITE UNSPECIFIED: ICD-10-CM
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
## 2025-07-17 LAB
APPEARANCE UR: CLEAR
BACTERIA #/AREA URNS HPF: ABNORMAL /HPF
BILIRUB UR QL STRIP.AUTO: NEGATIVE
CASTS URNS QL MICRO: ABNORMAL /LPF (ref 0–2)
COLOR UR: YELLOW
EPITHELIAL CELLS 1715: ABNORMAL /HPF (ref 0–5)
GLUCOSE UR STRIP.AUTO-MCNC: NEGATIVE MG/DL
KETONES UR STRIP.AUTO-MCNC: ABNORMAL MG/DL
LEUKOCYTE ESTERASE UR QL STRIP.AUTO: ABNORMAL
MICRO URNS: ABNORMAL
NITRITE UR QL STRIP.AUTO: POSITIVE
PH UR STRIP.AUTO: 5 [PH] (ref 5–8)
PROT UR QL STRIP: NEGATIVE MG/DL
RBC # URNS HPF: ABNORMAL /HPF (ref 0–2)
RBC UR QL AUTO: NEGATIVE
SP GR UR STRIP.AUTO: 1.02
UROBILINOGEN UR STRIP.AUTO-MCNC: 0.2 EU/DL
WBC #/AREA URNS HPF: ABNORMAL /HPF

## 2025-07-17 PROCEDURE — 87086 URINE CULTURE/COLONY COUNT: CPT

## 2025-07-17 PROCEDURE — 70450 CT HEAD/BRAIN W/O DYE: CPT

## 2025-07-17 PROCEDURE — 87077 CULTURE AEROBIC IDENTIFY: CPT

## 2025-07-17 PROCEDURE — 73560 X-RAY EXAM OF KNEE 1 OR 2: CPT | Mod: RT

## 2025-07-17 PROCEDURE — 72170 X-RAY EXAM OF PELVIS: CPT

## 2025-07-17 PROCEDURE — 700102 HCHG RX REV CODE 250 W/ 637 OVERRIDE(OP): Performed by: EMERGENCY MEDICINE

## 2025-07-17 PROCEDURE — RXMED WILLOW AMBULATORY MEDICATION CHARGE: Performed by: EMERGENCY MEDICINE

## 2025-07-17 PROCEDURE — 81001 URINALYSIS AUTO W/SCOPE: CPT

## 2025-07-17 PROCEDURE — 99285 EMERGENCY DEPT VISIT HI MDM: CPT

## 2025-07-17 PROCEDURE — 72125 CT NECK SPINE W/O DYE: CPT

## 2025-07-17 PROCEDURE — A9270 NON-COVERED ITEM OR SERVICE: HCPCS | Performed by: EMERGENCY MEDICINE

## 2025-07-17 PROCEDURE — 73560 X-RAY EXAM OF KNEE 1 OR 2: CPT | Mod: LT

## 2025-07-17 PROCEDURE — 87186 SC STD MICRODIL/AGAR DIL: CPT

## 2025-07-17 RX ORDER — SULFAMETHOXAZOLE AND TRIMETHOPRIM 800; 160 MG/1; MG/1
1 TABLET ORAL 2 TIMES DAILY
Qty: 14 TABLET | Refills: 0 | Status: ACTIVE | OUTPATIENT
Start: 2025-07-17 | End: 2025-07-25

## 2025-07-17 RX ORDER — IBUPROFEN 600 MG/1
600 TABLET, FILM COATED ORAL ONCE
Status: COMPLETED | OUTPATIENT
Start: 2025-07-17 | End: 2025-07-17

## 2025-07-17 RX ORDER — CLOTRIMAZOLE 1 %
1 CREAM (GRAM) TOPICAL 2 TIMES DAILY
Qty: 60 G | Refills: 0 | Status: SHIPPED | OUTPATIENT
Start: 2025-07-17 | End: 2025-08-01

## 2025-07-17 RX ORDER — SULFAMETHOXAZOLE AND TRIMETHOPRIM 800; 160 MG/1; MG/1
1 TABLET ORAL ONCE
Status: COMPLETED | OUTPATIENT
Start: 2025-07-18 | End: 2025-07-18

## 2025-07-17 RX ORDER — ACETAMINOPHEN 500 MG
1000 TABLET ORAL ONCE
Status: COMPLETED | OUTPATIENT
Start: 2025-07-17 | End: 2025-07-17

## 2025-07-17 RX ADMIN — ACETAMINOPHEN 1000 MG: 500 TABLET ORAL at 19:25

## 2025-07-17 RX ADMIN — IBUPROFEN 600 MG: 600 TABLET, FILM COATED ORAL at 21:49

## 2025-07-17 ASSESSMENT — FIBROSIS 4 INDEX: FIB4 SCORE: 2

## 2025-07-17 NOTE — LETTER
7/21/2025               Mc Krishnamurthy  [address] [address]        Dear Mc (MR#[id number])    This letter is sent in regards to your recent visit to the [facility] on 7/17/2025. During the visit, tests were performed to assist the physician in your medical diagnosis. A review of your tests requires that we notify you of the following:    Your urine culture was POSITIVE for a bacteria called Klebsiella pneumoniae. The antibiotic prescribed for you (sulfamethoxazole-trimethoprim) should be active to treat this bacteria. It is important that you continue taking your antibiotic until it is finished.     Please feel free to contact me at the number below if you have any questions or concerns. Thank you for your cooperation in the matter.    Sincerely,  ED Culture Follow-Up Staff  Lara Aguilar, PharmD    Critical access hospital, Emergency Department  [address] [place] [id number] [phone]

## 2025-07-18 ENCOUNTER — PHARMACY VISIT (OUTPATIENT)
Dept: PHARMACY | Facility: MEDICAL CENTER | Age: 80
End: 2025-07-18
Payer: COMMERCIAL

## 2025-07-18 VITALS
HEIGHT: 70 IN | DIASTOLIC BLOOD PRESSURE: 90 MMHG | HEART RATE: 91 BPM | SYSTOLIC BLOOD PRESSURE: 165 MMHG | BODY MASS INDEX: 34.36 KG/M2 | RESPIRATION RATE: 17 BRPM | WEIGHT: 240 LBS | OXYGEN SATURATION: 95 % | TEMPERATURE: 97.2 F

## 2025-07-18 PROCEDURE — A9270 NON-COVERED ITEM OR SERVICE: HCPCS | Performed by: EMERGENCY MEDICINE

## 2025-07-18 PROCEDURE — 96372 THER/PROPH/DIAG INJ SC/IM: CPT

## 2025-07-18 PROCEDURE — 700111 HCHG RX REV CODE 636 W/ 250 OVERRIDE (IP)

## 2025-07-18 PROCEDURE — 700102 HCHG RX REV CODE 250 W/ 637 OVERRIDE(OP): Performed by: EMERGENCY MEDICINE

## 2025-07-18 RX ORDER — ACETAMINOPHEN 500 MG
1000 TABLET ORAL ONCE
Status: COMPLETED | OUTPATIENT
Start: 2025-07-18 | End: 2025-07-18

## 2025-07-18 RX ORDER — DIAZEPAM 10 MG/2ML
5 INJECTION, SOLUTION INTRAMUSCULAR; INTRAVENOUS ONCE
Status: COMPLETED | OUTPATIENT
Start: 2025-07-18 | End: 2025-07-18

## 2025-07-18 RX ADMIN — DIAZEPAM 5 MG: 10 INJECTION, SOLUTION INTRAMUSCULAR; INTRAVENOUS at 06:00

## 2025-07-18 RX ADMIN — SULFAMETHOXAZOLE AND TRIMETHOPRIM 1 TABLET: 800; 160 TABLET ORAL at 00:19

## 2025-07-18 RX ADMIN — ACETAMINOPHEN 1000 MG: 500 TABLET ORAL at 03:43

## 2025-07-18 ASSESSMENT — PAIN DESCRIPTION - PAIN TYPE: TYPE: CHRONIC PAIN

## 2025-07-18 NOTE — ED PROVIDER NOTES
ED provider follow-up note    Briefly this is a 79-year-old male with Parkinson's disease who lives in a skilled nursing facility who was pending discharge after intervention after being evaluated for mechanical ground-level fall.  He was awaiting for his facility to accept him back although we were having difficulty getting a hold of facility.  Overnight he was having some agitation and was given a as needed dose of Valium.  This morning nursing staff was able to get a hold of facility and he will be transferred back in stable condition.    Lara Gould MD

## 2025-07-18 NOTE — ED NOTES
[facility] provided pt's belongings, POLST, discharge paperwork and discharge prescriptions. Pt transported back to [facility].   [facility] made aware by prior RN.
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
## 2025-07-18 NOTE — DISCHARGE PLANNING
LISANDRO faxed a PCS form to [facility] and set up transport with [facility] for 0715. RN aware of transport time.     Patient address is [facility]: [address], [place].

## 2025-07-18 NOTE — ED TRIAGE NOTES
Chief Complaint   Patient presents with    T-5000 GLF     BIB EMS from [facility]. Pt reports he was attempting to walk with his walker when his knees gave out and he fell. Unk headstrike, denies thinners.     Aox3, GCS 14.     BIB [facility] from [facility]. Pt reports he was trying to use his walker when his knees buckled and he feel. Unk headstrike, denies thinners    Hx: dementia, on hospice for parkinsons. POLST at bedside.     Pt given 5mg morphine by [facility] staff at approx 1730 pta, no interventions by EMS pta.

## 2025-07-18 NOTE — ED NOTES
Report from [facility]. Pt resting on [facility]. Pending transport back to [facility].

## 2025-07-18 NOTE — ED NOTES
Attempted to call [facility] for report, no answer, will continuing trying to get ahold of facility.

## 2025-07-18 NOTE — ED PROVIDER NOTES
ED Provider Note    CHIEF COMPLAINT  Chief Complaint   Patient presents with    T-5000 GLF     BIB EMS from [facility]. Pt reports he was attempting to walk with his walker when his knees gave out and he fell. Unk headstrike, denies thinners.      EXTERNAL RECORDS REVIEWED  Review of records shows the patient was last seen at [facility] 6/15/25 for a fall and closed head injury.     HPI/ROS  LIMITATION TO HISTORY   Select: : None  OUTSIDE HISTORIAN(S):  None    Mc Krishnamurthy is a 79 y.o. male with history of Parkinson's who presents to the Emergency Department via EMS from [facility] for evaluation after a fall onset prior to arrival. The patient reports he was attempting to turn around with his walker when he suddenly fell. Patient notes of pain at his right leg and states he believes he hit his head when he fell, but did not feel it. He adds he is also experiences pain at his buttocks after falling to the floor on his buttocks hard. Patient was able to walk after the fall. He also states he is experiencing pain at his neck, but notes this is not new compared to his baseline condition. Patient notes of a mild headache which he has been taking Tylenol for. Patient's last dose of Tylenol was this morning. Patient denies hip or pelvic pain. History of chronic sinus headaches which usually is alleviated with tylenol.     PAST MEDICAL HISTORY  Past Medical History[1]     SURGICAL HISTORY  Past Surgical History[2]     FAMILY HISTORY  History reviewed. No pertinent family history.    SOCIAL HISTORY   reports that he has quit smoking. His smoking use included cigarettes. He does not have any smokeless tobacco history on file. He reports that he does not currently use alcohol. He reports that he does not currently use drugs.    CURRENT MEDICATIONS  Previous Medications    ACETAMINOPHEN (TYLENOL) 325 MG TAB    Take 2 Tablets by mouth every 6 hours as needed for Moderate Pain or Mild Pain.    ALBUTEROL 108 (90 BASE)  MCG/ACT AERO SOLN INHALATION AEROSOL    Inhale 2 Puffs every four hours as needed for Shortness of Breath.    ANTACID ULTRA STRENGTH 1000 MG CHEW TAB    TAKE 2 TABLETS (2000MG) BY MOUTH THREE TIMES A DAY  AS NEEDED FOR DYSPEPSIA    CARBIDOPA-LEVODOPA ER (RYTARY) 48. MG CAP CR    Take 3 Capsules by mouth 4 times a day. Administer 6am, 10am, 2pm, and 6pm    CARBIDOPA-LEVODOPA SR (SINEMET CR)  MG PER TABLET    Take 1 Tablet by mouth at bedtime.    CARBOXYMETHYLCELLULOSE (REFRESH TEARS) 0.5 % SOLUTION    Administer 1 Drop into both eyes 2 times a day.    CHOLECALCIFEROL (VITAMIN D3) 125 MCG (5000 UT) CAP    Take 1 Capsule by mouth every day.    CONTINUOUS BLOOD GLUC  (FREESTYLE BEAN 2 READER) DEVICE    Apply 1 Each topically continuous.    CONTINUOUS BLOOD GLUC SENSOR (FREESTYLE BEAN 2 SENSOR) MISC    1 Each every 14 days.    DONEPEZIL (ARICEPT) 10 MG TABLET    Take 1 Tablet by mouth every evening.    FAMOTIDINE (PEPCID) 20 MG TAB    Take 1 Tablet by mouth every morning before breakfast.    FLUOXETINE (PROZAC) 10 MG CAP    TAKE 1 CAPSULE BY MOUTH TWICE DAILY FOR DEPRESSION    GLUCOSE 4 GM CHEWABLE TABLET    Chew 1 Tablet as needed for Low Blood Sugar.    INSULIN GLARGINE 100 UNIT/ML SOLUTION PEN-INJECTOR INJECTION    Inject 30 Units under the skin 2 times a day.    KETOCONAZOLE (NIZORAL) 2 % SHAMPOO    Apply  topically every 48 hours. APPLY TO SCALP, FACE AND GROIN, LET SIT FOR 3 TO 5 MINUTES THEN RINSE. ONCE IMPROVED, CONTINUE ONCE WEEKLY AS MAINTENANCE    LOSARTAN (COZAAR) 50 MG TAB    Take 50 mg by mouth every day.    MELATONIN 3 MG TAB    Take 1 Tablet by mouth at bedtime.    MEMANTINE (NAMENDA) 10 MG TAB    Take 10 mg by mouth 2 times a day.    METFORMIN (GLUCOPHAGE) 500 MG TAB    Take 1,000 mg by mouth 2 times a day.    MULTIPLE VITAMIN (MULTIVITAMIN) TAB    TAKE 1 TABLET BY MOUTH ONCE DAILY    NUTRITIONAL SUPPLEMENTS (GLUCERNA ADVANCE SHAKE PO)    Take 1 Can by mouth 1 time a day as needed  "(FOR MISSED MEALS OR SNACKS).    NYSTATIN (MYCOSTATIN) 711032 UNIT/GM CREAM TOPICAL CREAM    Apply 1 Application  topically 2 times a day. Apply to scrotum for skin infection    OMEPRAZOLE (PRILOSEC) 20 MG DELAYED-RELEASE CAPSULE    Take 20 mg by mouth every morning.    POLYETHYLENE GLYCOL 3350 (MIRALAX) 17 GM/SCOOP POWDER    TAKE 17GM MIXED IN 8OZ WATER AND DRINK BY MOUTH ONCE DAILY AS NEEDED FOR CONSTIPATION    PRAVASTATIN (PRAVACHOL) 20 MG TAB    Take 1 Tablet by mouth every day.    PROBIOTIC PRODUCT (PROBIOTIC DAILY) CAP    Take 1 Capsule by mouth every day.    ROPINIROLE (REQUIP) 0.25 MG TAB    Take 2 Tablets by mouth 3 times a day.    SHARPS CONTAINER ([employer] SHARPS  XL) MISC    1 Container every 30 days.    TAMSULOSIN (FLOMAX) 0.4 MG CAPSULE    Take 1 Capsule by mouth 1/2 hour after breakfast.    TRAZODONE (DESYREL) 50 MG TAB    Take 1 Tablet by mouth at bedtime as needed for Sleep.    TRIAMCINOLONE ACETONIDE (KENALOG) 0.1 % CREAM    Apply 1 Application topically every 12 hours as needed (FOR RASH ON NECK AND BUTTOCKS).    ULTICARE MICRO PEN NEEDLES    USE AS DIRECTED       ALLERGIES  Patient has no known allergies.    PHYSICAL EXAM  /67   Pulse 87   Temp 36.2 °C (97.2 °F) (Temporal)   Resp 18   Ht 1.778 m (5' 10\")   Wt 109 kg (240 lb)   SpO2 95%      Constitutional: Nontoxic appearing. Alert in no apparent distress.  Chronically ill-appearing.  HENT: Normocephalic, Atraumatic. Bilateral external ears normal. Nose normal.  Moist mucous membranes.  Oropharynx clear.  Eyes: Pupils are equal and reactive. Conjunctiva normal.   Neck: Supple, full range of motion  Heart: Regular rate and rhythm.  No murmurs.    Lungs: No respiratory distress, normal work of breathing. Lungs clear to auscultation bilaterally.  Abdomen Soft, no distention.  No tenderness to palpation.  Musculoskeletal: Atraumatic. No obvious deformities noted.  No lower extremity edema. Some pain with range of motion of bilateral " knees. No obvious trauma on extremities.   Skin: Warm, Dry.  No erythema, No rash.   Neurologic: Alert and oriented x3. Moving all extremities spontaneously without focal deficits.  Psychiatric: Affect normal, Mood normal, Appears appropriate and not intoxicated.     DIAGNOSTIC STUDIES / PROCEDURES      RADIOLOGY  I have independently interpreted the diagnostic imaging associated with this visit and am waiting the final reading from the radiologist.   My preliminary interpretation is as follows: no intracranial hemorrhage    Radiologist interpretation:  CT-CSPINE WITHOUT PLUS RECONS   Final Result      No acute fracture or traumatic listhesis in the cervical spine.      CT-HEAD W/O   Final Result      1. No CT evidence of acute infarct, hemorrhage or mass.   2. Mild global parenchymal atrophy. Chronic small vessel ischemic changes.         DX-PELVIS-1 OR 2 VIEWS   Final Result      1.  Suboptimal exam related to patient positioning.   2.  No displaced fracture seen.   3.  Bilateral hip joint degenerative changes.      DX-KNEE 2- RIGHT   Final Result      Questionable nondisplaced right fibular head/neck fracture. Correlate with point tenderness for acute injury.      DX-KNEE 2- LEFT   Final Result      1.  No radiographic evidence of acute traumatic injury.   2.  Tricompartmental degenerative changes, severe at the medial and patellofemoral compartments.   3.  Suboptimal exam related to poor positioning.            COURSE & MEDICAL DECISION MAKING    6:43 PM - Patient seen and examined at bedside. Discussed plan of care, including ordering for diagnostic imaging to evaluate the patient's condition following his fall. Patient agrees to the plan of care. Ordered for CT-C-Spine, DX-Knee Left, DX-Knee Right, DX-Pelvis, CT-Head to evaluate his symptoms.       ASSESSMENT, COURSE AND PLAN  Care Narrative: Elderly patient with history of Parkinson's who presents following ground-level mechanical fall.  He is alert with  reassuring vital signs on arrival.  Exam does not demonstrate any obvious signs of trauma.  There is no focal neurologic deficits on exam concerning for stroke.  I did consider laboratory examination however this fall seems purely mechanical in nature without concern for syncope.  Imaging does not show evidence of intracranial hemorrhage or cervical spine fracture.  X-rays do not show evidence of pelvic fracture.  X-rays of bilateral knees show a questionable fibular head fracture however on reevaluation the patient does not demonstrate any point tenderness over that area and states most of his pain is in the left knee.  He is able to get up and ambulate with a walker therefore I think the chance of fracture is low.  Will plan to discharge back to his facility.  Patient is agreeable with plan of care    9:21 PM - Upon reassessment, patient is resting comfortably with normal vital signs.  No new complaints at this time.  Discussed results with patient and/or family as well as importance of primary care follow up.  Patient understands plan of care and strict return precautions for new or changing symptoms    ADDITIONAL PROBLEM LIST  Problem #1: Fall - no notable injuries identified, discharge home with instructions on supportive care        DISPOSITION AND DISCUSSIONS    Escalation of care considered, and ultimately not performed:Laboratory analysis    Decision tools and prescription drugs considered including, but not limited to: Pain Medications OTC medication should be sufficient.      DISPOSITION:  Patient will be discharged home in stable condition.    FOLLOW UP:  [facility], Emergency Dept  [address] [address] [phone]    If symptoms worsen      OUTPATIENT MEDICATIONS:  New Prescriptions    No medications on file         FINAL DIAGNOSIS  1. Fall, initial encounter        The note accurately reflects work and decisions made by me.  Maria Zaidi M.D.  7/17/2025   9:52 PM    Chel REYNOLDS (Vicenta), am scribing for, and in the presence of, Maria Zaidi M.D..    Electronically signed by: Chel Perez (Vicenta), 7/17/2025    Maria REYNOLDS M.D. personally performed the services described in this documentation, as scribed by Chel Perez in my presence, and it is both accurate and complete.            [1]   Past Medical History:  Diagnosis Date    Acute respiratory failure with hypoxia (HCC) 11/15/2022    Cognitive impairment     Morbid obesity with body mass index (BMI) of 40.0 to 44.9 in adult (HCC) 07/20/2022    Parkinson disease (HCC)    [2] History reviewed. No pertinent surgical history.

## 2025-07-18 NOTE — DISCHARGE INSTRUCTIONS
You were seen in the Emergency Department for fall.    CT scans and xrays were completed without significant acute abnormalities.    Please use 1,000mg of tylenol every 6 hours as needed for pain.    Please follow up with your primary care physician.    He has a yeast infection in his diaper area.  I prescribed clotrimazole cream that can be applied 2 times per day for the next 14 days.    He also has a UTI.  I prescribed Bactrim.  He will take 1 tablet in the morning and 1 tablet in the evening for the next 7 days.     Return to the Emergency Department with severe headaches, confusion, vomiting, or other concerns.

## 2025-07-18 NOTE — ED NOTES
I spoke on the phone with pts hospice nurse Nathan. She was given updates on pts ED visit. She will attempt to contact [facility] so that we can move forward with discharge.

## 2025-07-18 NOTE — ED NOTES
Spoke with Rony at [facility], given report on pt. They are ready to receive pt at their facility.
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
## 2025-07-18 NOTE — ED NOTES
"Attempted to have pt pee in urinal, pt attempting to touch Rn stating \"your turning me on too much to pee.\" RN told pt not to be inappropriate. RN left room and asked male RN to try and get urine from pt.   "

## 2025-07-18 NOTE — ED NOTES
Pt changed, noted pungent smell and excoriation around groin site, picture in chart. ERP notified.

## 2025-07-18 NOTE — ED NOTES
Pt resting on gurney, no acute distress, bed alarm in place.    Study Title: Transcending COVID-19 barriers to pain care in [place]: Pragmatic comparative effectiveness trial of evidence-based, on-demand, digital behavioral treatments for chronic pain.  Sponsor:  [facility]   Study: [facility] Digital Pain Treatment Study - Transcending COVID-19 barriers to pain care in [place]: Pragmatic comparative effectiveness trial of evidence-based, on-demand, digital behavioral treatments for chronic pain.   IRB/Protocol #: 2021.177 - Phase 2?  Study#([facility]):  [id number]  Principle Investigator - Reji Abrams   Sub-Investigator - Zaki Taylor   Sponsor:  [facility] Screening Interest in Study Call    Attempt #: 1, 2, 3+: 3+      1. Contact Made: []Yes [x]No   1A. If yes, contact date:   1B. If no, date of final contact attempt: [id number]  1C. If no, reason(s) contact not made:  []Wrong number [x]No response []Other   1D. If other, please specify: left voicemail    2. Verbal commitment: []Yes  [x]No  2A. If yes, verbal commitment date:   2B. If no, reason: []Exclusion Criteria Met  []Desire not to participate []No reason provided []Other  2B1. If Type of Exclusion Criteria Met or other reason, specify: left voicemail/no response    I left a detailed message for the subject to call the office back at [phone]. This is my third & final call to reach the subject.

## 2025-07-20 LAB
BACTERIA UR CULT: ABNORMAL
BACTERIA UR CULT: ABNORMAL
SIGNIFICANT IND 70042: ABNORMAL
SITE SITE: ABNORMAL
SOURCE SOURCE: ABNORMAL

## 2025-07-21 NOTE — ED NOTES
ED Positive Culture Follow-up/Notification Note:    Date: 7/21/2025   Patient seen in the ED on 7/17/2025 for leg weakness, fall. No concerning signs for trauma on physical exam. Urinalysis concerning for UTI. No ROS noted for review of symptoms. Ucx + for K pneumoniae.     1. Fall, initial encounter    2. Yeast infection of the skin    3. Urinary tract infection without hematuria, site unspecified       Discharge Medication List as of 7/18/2025  6:42 AM        START taking these medications    Details   sulfamethoxazole-trimethoprim (BACTRIM DS) 800-160 MG tablet Take 1 Tablet by mouth 2 times a day for 7 days., Disp-14 Tablet, R-0, Normal      clotrimazole (LOTRIMIN) 1 % Cream Apply topically 2 times a day for 14 days., Disp-60 g, R-0, Normal           Allergies: Patient has no known allergies.     Vitals:    07/17/25 2200 07/18/25 0100 07/18/25 0600 07/18/25 0700   BP: (!) 159/77 (!) 144/86 (!) 175/95 (!) 165/90   Pulse: 88 85 87 91   Resp: 16 16 18 17   Temp:       TempSrc:       SpO2: 94% 96% 96% 95%   Weight:       Height:           Final cultures:   Results       Procedure Component Value Units Date/Time    URINE CULTURE(NEW) [[id number]]  (Abnormal)  (Susceptibility) Collected: 07/17/25 2303    Order Status: Completed Specimen: Urine, Straight Cath Updated: 07/20/25 1233     Significant Indicator POS     Source UR     Site URINE, STRAIGHT CATH     Culture Result -      Klebsiella pneumoniae  <10,000 cfu/mL      Susceptibility       Klebsiella pneumoniae (1)       Antibiotic Interpretation Microscan   Method Status    Ampicillin/sulbactam Sensitive <=4/2 mcg/mL SHAKIRA Final    Amikacin  [*]  Sensitive <=16 mcg/mL SHAKIRA Final    Amoxicillin/Clavulanic Acid Sensitive <=8/4 mcg/mL SHAKIRA Final    Aztreonam  [*]  Sensitive <=4 mcg/mL SHAKIRA Final    Ceftolozane+Tazobactam  [*]  Sensitive <=2 mcg/mL SHAKIRA Final    Ceftriaxone Sensitive <=1 mcg/mL SHAKIRA Final    Ceftazidime  [*]  Sensitive <=1 mcg/mL SHAKIRA Final    Cefazolin  Sensitive <=2 mcg/mL SHAKIRA Final     Breakpoints when Cefazolin is used for therapy of infections  other than uncomplicated UTIs due to Enterobacterales are as  follows:  SHAKIRA and Interpretation:  <=2 S  4 I  >=8 R         Ciprofloxacin Sensitive <=0.25 mcg/mL SHAKIRA Final    Cefepime Sensitive <=2 mcg/mL SHAKIRA Final    Cefuroxime Sensitive <=4 mcg/mL SHAKIRA Final    Ceftazidime+Avibactam  [*]  Sensitive <=4 mcg/mL SHAKIRA Final    Ertapenem  [*]  Sensitive <=0.5 mcg/mL SHAKIRA Final    Nitrofurantoin Intermediate 64 mcg/mL SHAKIRA Final    Gentamicin Sensitive <=2 mcg/mL SHAKIRA Final    Imipenem  [*]  Sensitive <=1 mcg/mL SHAKIRA Final    Levofloxacin Sensitive <=0.5 mcg/mL SHAKIRA Final    Meropenem Sensitive <=1 mcg/mL SHAKIRA Final    Meropenem/Vaborbactam  [*]  Sensitive <=2 mcg/mL SHAKIRA Final    Minocycline Sensitive <=4 mcg/mL SHAKIRA Final    Pip/Tazobactam Sensitive <=8 mcg/mL SHAKIRA Final    Trimeth/Sulfa Sensitive <=0.5/9.5 mcg/mL SHAKIRA Final    Tetracycline  [*]  Sensitive <=4 mcg/mL SHAKIRA Final    Tigecycline Sensitive <=2 mcg/mL SHAKIRA Final    Tobramycin Sensitive <=2 mcg/mL SHAKIRA Final               [*]  Suppressed Antibiotic                   URINALYSIS (UA) [[id number]]  (Abnormal) Collected: 07/17/25 2303    Order Status: Completed Specimen: Urine, Cath Updated: 07/17/25 2333     Color Yellow     Character Clear     Specific Gravity 1.024     Ph 5.0     Glucose Negative mg/dL      Ketones Trace mg/dL      Protein Negative mg/dL      Bilirubin Negative     Urobilinogen, Urine 0.2 EU/dL      Nitrite Positive     Leukocyte Esterase Small     Occult Blood Negative     Micro Urine Req Microscopic            Plan:   Appropriate antibiotic therapy prescribed. No changes required based upon culture result.  Sent letter to patient to notify of positive culture result and encourage compliance with prescribed antibiotics.   Lara Aguilar, YehudaD